# Patient Record
Sex: FEMALE | Race: WHITE | HISPANIC OR LATINO | Employment: UNEMPLOYED | URBAN - METROPOLITAN AREA
[De-identification: names, ages, dates, MRNs, and addresses within clinical notes are randomized per-mention and may not be internally consistent; named-entity substitution may affect disease eponyms.]

---

## 2023-12-05 ENCOUNTER — DOCUMENTATION (OUTPATIENT)
Dept: OBSTETRICS AND GYNECOLOGY | Facility: CLINIC | Age: 38
End: 2023-12-05
Payer: MEDICAID

## 2023-12-05 DIAGNOSIS — Z30.9 ENCOUNTER FOR CONTRACEPTIVE MANAGEMENT, UNSPECIFIED TYPE: ICD-10-CM

## 2023-12-05 RX ORDER — NORETHINDRONE 0.35 MG/1
1 TABLET ORAL DAILY
COMMUNITY
Start: 2018-11-01 | End: 2023-12-05 | Stop reason: SDUPTHER

## 2023-12-06 ENCOUNTER — TELEPHONE (OUTPATIENT)
Dept: OBSTETRICS AND GYNECOLOGY | Facility: CLINIC | Age: 38
End: 2023-12-06
Payer: MEDICAID

## 2023-12-06 RX ORDER — NORETHINDRONE 0.35 MG/1
1 TABLET ORAL DAILY
Qty: 90 TABLET | Refills: 0 | Status: SHIPPED | OUTPATIENT
Start: 2023-12-06 | End: 2024-02-09 | Stop reason: SDUPTHER

## 2023-12-06 NOTE — TELEPHONE ENCOUNTER
Pt is aware a fax for OCP RF's was received from pharm yestarday and Rx request was sent to provider to sign off. Advised to check with pharm in 24-48hrs.

## 2024-01-08 DIAGNOSIS — B00.9 HERPES: ICD-10-CM

## 2024-01-08 RX ORDER — ACYCLOVIR 400 MG/1
400 TABLET ORAL 2 TIMES DAILY
Qty: 90 TABLET | Refills: 0 | Status: SHIPPED | OUTPATIENT
Start: 2024-01-08 | End: 2024-02-09 | Stop reason: SDUPTHER

## 2024-01-08 RX ORDER — ACYCLOVIR 400 MG/1
400 TABLET ORAL 2 TIMES DAILY
COMMUNITY
End: 2024-01-08 | Stop reason: SDUPTHER

## 2024-01-30 ENCOUNTER — OFFICE VISIT (OUTPATIENT)
Dept: CARDIOLOGY | Facility: CLINIC | Age: 39
End: 2024-01-30
Payer: MEDICAID

## 2024-01-30 VITALS
SYSTOLIC BLOOD PRESSURE: 120 MMHG | BODY MASS INDEX: 25.69 KG/M2 | HEIGHT: 63 IN | WEIGHT: 145 LBS | OXYGEN SATURATION: 97 % | DIASTOLIC BLOOD PRESSURE: 75 MMHG | HEART RATE: 103 BPM

## 2024-01-30 DIAGNOSIS — G90.A POTS (POSTURAL ORTHOSTATIC TACHYCARDIA SYNDROME): Primary | ICD-10-CM

## 2024-01-30 DIAGNOSIS — R42 DIZZINESS: ICD-10-CM

## 2024-01-30 PROCEDURE — 93010 ELECTROCARDIOGRAM REPORT: CPT | Performed by: INTERNAL MEDICINE

## 2024-01-30 PROCEDURE — 99214 OFFICE O/P EST MOD 30 MIN: CPT | Performed by: INTERNAL MEDICINE

## 2024-01-30 PROCEDURE — 93005 ELECTROCARDIOGRAM TRACING: CPT | Performed by: INTERNAL MEDICINE

## 2024-01-30 RX ORDER — LORATADINE 10 MG
10 TABLET,DISINTEGRATING ORAL AS NEEDED
COMMUNITY

## 2024-01-30 ASSESSMENT — PATIENT HEALTH QUESTIONNAIRE - PHQ9
2. FEELING DOWN, DEPRESSED OR HOPELESS: NOT AT ALL
1. LITTLE INTEREST OR PLEASURE IN DOING THINGS: NOT AT ALL
SUM OF ALL RESPONSES TO PHQ9 QUESTIONS 1 AND 2: 0

## 2024-01-30 ASSESSMENT — COLUMBIA-SUICIDE SEVERITY RATING SCALE - C-SSRS
6. HAVE YOU EVER DONE ANYTHING, STARTED TO DO ANYTHING, OR PREPARED TO DO ANYTHING TO END YOUR LIFE?: NO
2. HAVE YOU ACTUALLY HAD ANY THOUGHTS OF KILLING YOURSELF?: NO
1. IN THE PAST MONTH, HAVE YOU WISHED YOU WERE DEAD OR WISHED YOU COULD GO TO SLEEP AND NOT WAKE UP?: NO

## 2024-01-30 ASSESSMENT — PAIN SCALES - GENERAL: PAINLEVEL: 0-NO PAIN

## 2024-01-31 LAB
ATRIAL RATE: 103 BPM
P AXIS: 58 DEGREES
P OFFSET: 192 MS
P ONSET: 141 MS
PR INTERVAL: 170 MS
Q ONSET: 226 MS
QRS COUNT: 17 BEATS
QRS DURATION: 72 MS
QT INTERVAL: 320 MS
QTC CALCULATION(BAZETT): 419 MS
QTC FREDERICIA: 383 MS
R AXIS: 10 DEGREES
T AXIS: 50 DEGREES
T OFFSET: 386 MS
VENTRICULAR RATE: 103 BPM

## 2024-02-08 NOTE — PROGRESS NOTES
"CHIEF COMPLAINT: routine follow-up visit    HISTORY OF PRESENT ILLNESS:    PCP: Dr. Ciarra Garcia     Ms. Del Angel is a 39 yo F with hx as listed below who returns to Cardiology Clinic for follow-up visit; last seen by me in 2021. Tried to quit smoking in interim but felt LH, weak, and more palpitations (thinks POTS flare) so has restarted. ECG today shows sinus tachycardia with  bpm. Is wearing compression and putting salt in drinks. On Fitbit, HR gets to 140s and she is only doing about 400 steps per day. Asking about saline infusions. TE (2019): EF 70-75%, no MVP and no MR. Denies CP or SOB. Of note, was on florinef in the past for orthostatic hypotension without much symptomatic improvement.      PAST MEDICAL/SURGICAL HISTORY:  -NAFLD  -MVP (not seen on recent echo)  -EDS (hypermobility type)  -anxiety/depression  -orthostatic hypotension (previously on florinef); ?POTS  -tobacco abuse  -PTSD  -scoliosis  -fibromyalgia  -GERD with mild gastritis  -B12 deficiency anemia  -HSV     PRIOR CARDIAC TESTING:   -TTE (2019): EF 70-75%, no MVP, no MR  -TTE (2013): EF 65%, MVP, no MR  -CXR (2014): pectus excavatum     FAMILY HISTORY:  -lung cancer in MGF, PGM  -Mom: DM   -Brother: questionable Marfan's     Allergies   Allergen Reactions    Codeine Hives     Current Outpatient Medications:     acyclovir (Zovirax) 400 mg tablet, Take 1 tablet (400 mg) by mouth 2 times a day., Disp: 90 tablet, Rfl: 0    loratadine (Claritin Reditabs) 10 mg disintegrating tablet, Take 1 tablet (10 mg) by mouth if needed for allergies., Disp: , Rfl:     norethindrone (Micronor) 0.35 mg tablet, Take 1 tablet (0.35 mg) by mouth once daily., Disp: 90 tablet, Rfl: 0    /75 (BP Location: Left arm, Patient Position: Sitting)   Pulse 103   Ht 1.6 m (5' 3\")   Wt 65.8 kg (145 lb)   SpO2 97%   BMI 25.69 kg/m²     PHYSICAL EXAM:  GENERAL: NAD  HEENT: no JVD  CV: RRR without m/r/g  PULM: CTAB  EXT: non-edematous bilateral lower extremities "     LABS  WBC (x10E9/L)   Date Value   07/29/2021 11.4 (H)     Hemoglobin (g/dL)   Date Value   07/29/2021 14.8     Platelets (x10E9/L)   Date Value   07/29/2021 285     Sodium (mmol/L)   Date Value   07/29/2021 137     Potassium (mmol/L)   Date Value   07/29/2021 4.0     Chloride (mmol/L)   Date Value   07/29/2021 106     Bicarbonate (mmol/L)   Date Value   07/29/2021 22     Urea Nitrogen (mg/dL)   Date Value   07/29/2021 14     Creatinine (mg/dL)   Date Value   07/29/2021 0.79     Calcium (mg/dL)   Date Value   07/29/2021 9.2     Total Protein (g/dL)   Date Value   07/29/2021 7.3     Total Bilirubin (mg/dL)   Date Value   07/29/2021 0.4     Alkaline Phosphatase (U/L)   Date Value   07/29/2021 79     ALT (SGPT) (U/L)   Date Value   07/29/2021 11     AST (U/L)   Date Value   07/29/2021 16     Glucose (mg/dL)   Date Value   07/29/2021 95     Cholesterol (mg/dL)   Date Value   07/29/2021 175   08/20/2019 182     HDL (mg/dL)   Date Value   07/29/2021 47.0   08/20/2019 62.2     Triglycerides (mg/dL)   Date Value   07/29/2021 127   08/20/2019 113     Lab Results   Component Value Date    LDLF 103 (H) 07/29/2021     ASSESSMENT/PLAN: 35 yo F with hx of ?POTS here for follow-up. Discussed lifestyle modifications and she is interested in Aquatic therapy. Autonomic Neurology referral placed. Encouraged her to wear compression stockings and exercise regularly. RTC prn..

## 2024-02-09 ENCOUNTER — LAB (OUTPATIENT)
Dept: LAB | Facility: LAB | Age: 39
End: 2024-02-09
Payer: MEDICAID

## 2024-02-09 ENCOUNTER — OFFICE VISIT (OUTPATIENT)
Dept: OBSTETRICS AND GYNECOLOGY | Facility: CLINIC | Age: 39
End: 2024-02-09
Payer: MEDICAID

## 2024-02-09 VITALS
DIASTOLIC BLOOD PRESSURE: 60 MMHG | WEIGHT: 146 LBS | HEIGHT: 63 IN | BODY MASS INDEX: 25.87 KG/M2 | SYSTOLIC BLOOD PRESSURE: 98 MMHG

## 2024-02-09 DIAGNOSIS — Z30.9 ENCOUNTER FOR CONTRACEPTIVE MANAGEMENT, UNSPECIFIED TYPE: ICD-10-CM

## 2024-02-09 DIAGNOSIS — N89.8 VAGINAL IRRITATION: ICD-10-CM

## 2024-02-09 DIAGNOSIS — B00.9 HERPES: ICD-10-CM

## 2024-02-09 DIAGNOSIS — Z11.3 SCREEN FOR STD (SEXUALLY TRANSMITTED DISEASE): ICD-10-CM

## 2024-02-09 DIAGNOSIS — Z01.419 WELL WOMAN EXAM: ICD-10-CM

## 2024-02-09 DIAGNOSIS — Z11.3 ROUTINE SCREENING FOR STI (SEXUALLY TRANSMITTED INFECTION): ICD-10-CM

## 2024-02-09 DIAGNOSIS — Z11.3 ROUTINE SCREENING FOR STI (SEXUALLY TRANSMITTED INFECTION): Primary | ICD-10-CM

## 2024-02-09 PROCEDURE — 86803 HEPATITIS C AB TEST: CPT

## 2024-02-09 PROCEDURE — 99395 PREV VISIT EST AGE 18-39: CPT | Performed by: ADVANCED PRACTICE MIDWIFE

## 2024-02-09 PROCEDURE — 87389 HIV-1 AG W/HIV-1&-2 AB AG IA: CPT

## 2024-02-09 PROCEDURE — 87205 SMEAR GRAM STAIN: CPT

## 2024-02-09 PROCEDURE — 86780 TREPONEMA PALLIDUM: CPT

## 2024-02-09 PROCEDURE — 36415 COLL VENOUS BLD VENIPUNCTURE: CPT

## 2024-02-09 PROCEDURE — 87661 TRICHOMONAS VAGINALIS AMPLIF: CPT

## 2024-02-09 PROCEDURE — 87340 HEPATITIS B SURFACE AG IA: CPT

## 2024-02-09 PROCEDURE — 87800 DETECT AGNT MULT DNA DIREC: CPT

## 2024-02-09 RX ORDER — ACYCLOVIR 400 MG/1
400 TABLET ORAL 2 TIMES DAILY
Qty: 90 TABLET | Refills: 3 | Status: SHIPPED | OUTPATIENT
Start: 2024-02-09

## 2024-02-09 RX ORDER — NORETHINDRONE 0.35 MG/1
1 TABLET ORAL DAILY
Qty: 90 TABLET | Refills: 4 | Status: SHIPPED | OUTPATIENT
Start: 2024-02-09

## 2024-02-09 ASSESSMENT — PAIN SCALES - GENERAL: PAINLEVEL: 0-NO PAIN

## 2024-02-09 NOTE — PROGRESS NOTES
Assessment/Plan   Problem List Items Addressed This Visit             ICD-10-CM       Medium    Encounter for contraceptive management Z30.9    Relevant Medications    norethindrone (Micronor) 0.35 mg tablet    Herpes B00.9    Relevant Medications    acyclovir (Zovirax) 400 mg tablet    Screen for STD (sexually transmitted disease) Z11.3    Relevant Orders    C. Trachomatis / N. Gonorrhoeae, Amplified Detection (Completed)    Trichomonas vaginalis, Nucleic Acid Detection (Completed)    Vaginal irritation N89.8    Relevant Orders    Vaginitis Gram Stain For Bacterial Vaginosis + Yeast    Well woman exam Z01.419     STI screen  Up to date with pap  Refill on POP            Other Visit Diagnoses         Codes    Routine screening for STI (sexually transmitted infection)    -  Primary Z11.3    Relevant Orders    Hepatitis BsAg (Completed)    Hepatitis C Antibody (Completed)    HIV 1/2 Antigen/Antibody Screen with Reflex to Confirmation (Completed)    Syphilis Screen with Reflex (Completed)            ROSA Mcneill-TAN     Subjective   Alanna Del Angel is a 38 y.o. adult who is here for a routine exam.     They/them pronouns   Identifies as nonbinary     Takes daily suppressive therapy for HSV, rare outbreaks, needs refill.   Needs refill on POP     Worried about possible yeast infection     Lives with: roommates   Current employment: unemployed-trying for disability   T/E/D: former tobacco/no ETOH/ medical marijuana   Up to date vision/dental: needs to schedule   PCP: jade Garcia   Menstrual history: irregular bleeding on POP   Sexual history: long distance partner-not currently sexually active    male partner within the last year   Pregnancy history:  G/P  T: P:  EAB:   Ectopic:   SAB:   L:      Diet: lots of food intolerance   Exercise: 4000 steps daily     PMH, PSH, and Family hx reviewed and updated    Current contraception: oral progesterone-only contraceptive  Refill Y/N:    Last pap: 2022,  "NILM/neg HPV   History of abnormal Pap smear: yes - remote hx of abnormal in college, colpo negative   Family history of breast, uterine,  ovarian cancer: no  Regular self breast exam: no  Last mammogram: n/a   History of abnormal mammogram: no          Review of Systems    Objective   BP 98/60   Ht 1.6 m (5' 3\")   Wt 66.2 kg (146 lb)   LMP 01/12/2024   BMI 25.86 kg/m²     Physical Exam  Constitutional:       Appearance: Normal appearance.   HENT:      Head: Normocephalic.   Neck:      Thyroid: No thyroid mass, thyromegaly or thyroid tenderness.   Cardiovascular:      Rate and Rhythm: Normal rate and regular rhythm.   Pulmonary:      Effort: Pulmonary effort is normal.      Breath sounds: Normal breath sounds.   Chest:   Breasts:     Right: Normal. No mass, nipple discharge or tenderness.      Left: Normal. No mass, nipple discharge or tenderness.   Abdominal:      Palpations: Abdomen is soft.   Genitourinary:     Vagina: Vaginal discharge present.      Cervix: Normal.      Comments: Scant/white discharge, no odor   Musculoskeletal:         General: Normal range of motion.   Lymphadenopathy:      Upper Body:      Right upper body: No axillary adenopathy.      Left upper body: No axillary adenopathy.   Skin:     General: Skin is warm and dry.   Neurological:      Mental Status: Alanna Del Angel is alert and oriented to person, place, and time.   Psychiatric:         Mood and Affect: Mood normal.         "

## 2024-02-09 NOTE — PROGRESS NOTES
JENNIE ( Breast/Pelvic Exam )    Last pap: 12/20/22 ( wnl - hpv )   Last mammo: n/a  Last colon: n/a    CHAPERONE, LEIGHANN YOUNGBLOOD, [facility]

## 2024-02-10 PROBLEM — N89.8 VAGINAL IRRITATION: Status: ACTIVE | Noted: 2024-02-10

## 2024-02-10 PROBLEM — B00.9 HERPES: Status: ACTIVE | Noted: 2024-02-10

## 2024-02-10 PROBLEM — Z11.3 SCREEN FOR STD (SEXUALLY TRANSMITTED DISEASE): Status: ACTIVE | Noted: 2024-02-10

## 2024-02-10 PROBLEM — Z30.9 ENCOUNTER FOR CONTRACEPTIVE MANAGEMENT: Status: ACTIVE | Noted: 2024-02-10

## 2024-02-10 PROBLEM — Z01.419 WELL WOMAN EXAM: Status: ACTIVE | Noted: 2024-02-10

## 2024-02-10 LAB
C TRACH RRNA SPEC QL NAA+PROBE: NEGATIVE
HBV SURFACE AG SERPL QL IA: NONREACTIVE
HCV AB SER QL: NONREACTIVE
HIV 1+2 AB+HIV1 P24 AG SERPL QL IA: NONREACTIVE
N GONORRHOEA DNA SPEC QL PROBE+SIG AMP: NEGATIVE
T VAGINALIS RRNA SPEC QL NAA+PROBE: NEGATIVE
TREPONEMA PALLIDUM IGG+IGM AB [PRESENCE] IN SERUM OR PLASMA BY IMMUNOASSAY: NONREACTIVE

## 2024-02-11 LAB
CLUE CELLS VAG LPF-#/AREA: NORMAL /[LPF]
NUGENT SCORE: 0
YEAST VAG WET PREP-#/AREA: NORMAL

## 2024-02-23 ENCOUNTER — EVALUATION (OUTPATIENT)
Dept: PHYSICAL THERAPY | Facility: CLINIC | Age: 39
End: 2024-02-23
Payer: MEDICAID

## 2024-02-23 VITALS — SYSTOLIC BLOOD PRESSURE: 115 MMHG | HEART RATE: 81 BPM | DIASTOLIC BLOOD PRESSURE: 73 MMHG

## 2024-02-23 DIAGNOSIS — Z74.09 DECREASED FUNCTIONAL MOBILITY AND ENDURANCE: ICD-10-CM

## 2024-02-23 DIAGNOSIS — G90.A POTS (POSTURAL ORTHOSTATIC TACHYCARDIA SYNDROME): ICD-10-CM

## 2024-02-23 DIAGNOSIS — R53.1 WEAKNESS: Primary | ICD-10-CM

## 2024-02-23 PROCEDURE — 97162 PT EVAL MOD COMPLEX 30 MIN: CPT | Mod: GP

## 2024-02-23 ASSESSMENT — PAIN - FUNCTIONAL ASSESSMENT: PAIN_FUNCTIONAL_ASSESSMENT: 0-10

## 2024-02-23 ASSESSMENT — ENCOUNTER SYMPTOMS
DEPRESSION: 0
OCCASIONAL FEELINGS OF UNSTEADINESS: 1
LOSS OF SENSATION IN FEET: 1

## 2024-02-23 ASSESSMENT — PAIN SCALES - GENERAL: PAINLEVEL_OUTOF10: 7

## 2024-02-23 ASSESSMENT — PAIN DESCRIPTION - DESCRIPTORS: DESCRIPTORS: ACHING;THROBBING

## 2024-02-23 NOTE — PROGRESS NOTES
Physical Therapy    Physical Therapy Evaluation and Treatment      Patient Name: Alanna Del Angel  MRN: [id number]  Today's Date: 2/23/2024  Time Calculation  Start Time: 0805  Stop Time: 0845  Time Calculation (min): 40 min    Insurance:  2024 [employer] AUTH AFTER 30 VS VERNELL YR     Assessment:  PT Assessment Results: Decreased strength, Decreased endurance, Impaired balance, Pain  Rehab Prognosis: Good  Pt is a 38 year old presenting to PT with POTS/EDS. Standardized testing of FGA, MMT and symptoms report reveal that pt has multiple impairments in body structures/functions, activity limitations and participation restrictions. These include subjective and objective findings such as decreased strength, symptoms with mobility and balance based deficits.  Pt to benefit from skilled PT interventions to improve functional mobility deficits to improve independence and decrease fall risk.      Plan:  Treatment/Interventions: Aquatic therapy, Education/ Instruction, Gait training, Neuromuscular re-education, Therapeutic activities, Therapeutic exercises  PT Plan: Skilled PT  PT Frequency: 1 time per week  Duration: 6-8 weeks      Current Problem:   1. Weakness        2. POTS (postural orthostatic tachycardia syndrome)  Referral to Physical Therapy      3. Decreased functional mobility and endurance            Subjective     Pt reports to PT ambulatory without a device. Pt reports having previous aquatic therapy for EDS in the past and has seen significant benefits. Recently, pt has experienced an increase in POTS related symptoms of lightheadedness/dizziness. EDS related symptoms found in large joints vs smaller joints however does affect usage of utilization of hands. Pt experiences numbness in B feet consistently. Current exercise level is minimal, completes ~ 4000 steps per day.     General:     Vital Signs:  Vital Signs  Heart Rate: 81  BP: 115/73    Once in standing; /71   Pain:   Pain Assessment  Pain  Assessment: 0-10  Pain Score: 7  Pain Type: Chronic pain  Pain Location: Back  Pain Descriptors: Aching, Throbbing  Home Living:    Pt lives in a multi level home with friend  Prior Level of Function:   Functional mobility; independent   ADLs; limited with pain, but independent  (+) Driving  Patient goal;  Improve muscle strength and reduce dislocations  Right handed    Objective   Right lower extremity  Hip flexion; 4-/5  Hip abduction; 4/5  Hip adduction; 4/5   Knee flexion; 4+/5  Knee extension; 4+/5  Ankle dorsiflexion; 4-/5  Ankle plantarflexion; 4-/5    Left lower extremity  Hip flexion; 4-/5  Hip abduction; 4/5  Hip adduction; 4/5   Knee flexion; 4+/5  Knee extension; 4+/5  Ankle dorsiflexion; 4-/5  Ankle plantarflexion; 4-/5    Right upper extremity  Shldr flexion; 4-/5  Elbow flexion; 4/5  Elbow extension; 4/5   WFL    Left upper extremity  Shldr flexion; 3+/5  Elbow flexion; 4/5  Elbow extension; 4/5  ; WFL    Sensation:  Pt reports numbness on bottom of feet, denies through lower limb    Coordination  MARIA ELENA at ankles; intact      Outcome Measures:  FGA - Functional Gait Assessment  Gait level surface: 3  Change in gait speed: 3  Gait with horizontal head turns: 2  Gait with vertical head turns: 2  Gait and pivot turn: 2  Step over obstacle: 3  Gait with narrow base of support: 2  Gait with eyes closed: 2  Ambulating backwards: 2  Steps: 3  FGA Total Score: 24    Other Measures  Lower Extremity Funtional Score (LEFS): 34     Treatments:  Education and discussion on HEP and treatment regarding the benefits related to current condition, POC, pathophysiology, and precautions    Goals:  Pt will improve FGA by 3 points to meet MASSIEL  Pt will demonstrate independence with HEP  Pt will report 0 falls during POC  Pt will report a decrease of average pain full body to 6/10  Pt will improve LEFS by 9 points to meet MASSIEL

## 2024-02-26 ENCOUNTER — OFFICE VISIT (OUTPATIENT)
Dept: PRIMARY CARE | Facility: CLINIC | Age: 39
End: 2024-02-26
Payer: MEDICAID

## 2024-02-26 VITALS
SYSTOLIC BLOOD PRESSURE: 124 MMHG | HEART RATE: 86 BPM | DIASTOLIC BLOOD PRESSURE: 84 MMHG | HEIGHT: 63 IN | BODY MASS INDEX: 25.73 KG/M2 | WEIGHT: 145.2 LBS | OXYGEN SATURATION: 98 %

## 2024-02-26 DIAGNOSIS — F41.1 GAD (GENERALIZED ANXIETY DISORDER): Primary | ICD-10-CM

## 2024-02-26 DIAGNOSIS — F51.01 PRIMARY INSOMNIA: ICD-10-CM

## 2024-02-26 DIAGNOSIS — Z91.018 FOOD ALLERGY: ICD-10-CM

## 2024-02-26 DIAGNOSIS — G90.A POTS (POSTURAL ORTHOSTATIC TACHYCARDIA SYNDROME): ICD-10-CM

## 2024-02-26 DIAGNOSIS — K58.8 OTHER IRRITABLE BOWEL SYNDROME: ICD-10-CM

## 2024-02-26 DIAGNOSIS — F43.10 PTSD (POST-TRAUMATIC STRESS DISORDER): ICD-10-CM

## 2024-02-26 DIAGNOSIS — F32.1 CURRENT MODERATE EPISODE OF MAJOR DEPRESSIVE DISORDER, UNSPECIFIED WHETHER RECURRENT (MULTI): ICD-10-CM

## 2024-02-26 PROCEDURE — 96127 BRIEF EMOTIONAL/BEHAV ASSMT: CPT | Performed by: FAMILY MEDICINE

## 2024-02-26 PROCEDURE — 99215 OFFICE O/P EST HI 40 MIN: CPT | Performed by: FAMILY MEDICINE

## 2024-02-26 RX ORDER — AMITRIPTYLINE HYDROCHLORIDE 25 MG/1
25 TABLET, FILM COATED ORAL NIGHTLY
Qty: 30 TABLET | Refills: 0 | Status: SHIPPED | OUTPATIENT
Start: 2024-02-26 | End: 2024-05-01 | Stop reason: SDUPTHER

## 2024-02-26 RX ORDER — B.COAGULANS/DIGESTIVE ENZYM 10 2B CELL
1 CAPSULE ORAL DAILY
Qty: 90 TABLET | Refills: 3 | Status: SHIPPED | OUTPATIENT
Start: 2024-02-26

## 2024-02-26 RX ORDER — DESVENLAFAXINE 50 MG/1
50 TABLET, EXTENDED RELEASE ORAL DAILY
Qty: 90 TABLET | Refills: 0 | Status: SHIPPED | OUTPATIENT
Start: 2024-02-26 | End: 2024-05-01 | Stop reason: SDUPTHER

## 2024-02-26 ASSESSMENT — PATIENT HEALTH QUESTIONNAIRE - PHQ9
7. TROUBLE CONCENTRATING ON THINGS, SUCH AS READING THE NEWSPAPER OR WATCHING TELEVISION: MORE THAN HALF THE DAYS
3. TROUBLE FALLING OR STAYING ASLEEP OR SLEEPING TOO MUCH: NEARLY EVERY DAY
SUM OF ALL RESPONSES TO PHQ9 QUESTIONS 1 AND 2: 2
2. FEELING DOWN, DEPRESSED OR HOPELESS: SEVERAL DAYS
6. FEELING BAD ABOUT YOURSELF - OR THAT YOU ARE A FAILURE OR HAVE LET YOURSELF OR YOUR FAMILY DOWN: SEVERAL DAYS
5. POOR APPETITE OR OVEREATING: NEARLY EVERY DAY
10. IF YOU CHECKED OFF ANY PROBLEMS, HOW DIFFICULT HAVE THESE PROBLEMS MADE IT FOR YOU TO DO YOUR WORK, TAKE CARE OF THINGS AT HOME, OR GET ALONG WITH OTHER PEOPLE: SOMEWHAT DIFFICULT
4. FEELING TIRED OR HAVING LITTLE ENERGY: NEARLY EVERY DAY
8. MOVING OR SPEAKING SO SLOWLY THAT OTHER PEOPLE COULD HAVE NOTICED. OR THE OPPOSITE, BEING SO FIGETY OR RESTLESS THAT YOU HAVE BEEN MOVING AROUND A LOT MORE THAN USUAL: NOT AT ALL
9. THOUGHTS THAT YOU WOULD BE BETTER OFF DEAD, OR OF HURTING YOURSELF: NOT AT ALL
1. LITTLE INTEREST OR PLEASURE IN DOING THINGS: SEVERAL DAYS
SUM OF ALL RESPONSES TO PHQ QUESTIONS 1-9: 14
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
## 2024-03-06 ENCOUNTER — TELEPHONE (OUTPATIENT)
Dept: PRIMARY CARE | Facility: CLINIC | Age: 39
End: 2024-03-06

## 2024-03-11 ENCOUNTER — APPOINTMENT (OUTPATIENT)
Dept: PRIMARY CARE | Facility: CLINIC | Age: 39
End: 2024-03-11
Payer: MEDICAID

## 2024-03-12 ENCOUNTER — APPOINTMENT (OUTPATIENT)
Dept: PRIMARY CARE | Facility: CLINIC | Age: 39
End: 2024-03-12
Payer: MEDICAID

## 2024-03-19 ENCOUNTER — APPOINTMENT (OUTPATIENT)
Dept: NEUROLOGY | Facility: HOSPITAL | Age: 39
End: 2024-03-19
Payer: MEDICAID

## 2024-03-26 ENCOUNTER — LAB (OUTPATIENT)
Dept: LAB | Facility: LAB | Age: 39
End: 2024-03-26
Payer: MEDICAID

## 2024-03-26 DIAGNOSIS — F32.1 CURRENT MODERATE EPISODE OF MAJOR DEPRESSIVE DISORDER, UNSPECIFIED WHETHER RECURRENT (MULTI): ICD-10-CM

## 2024-03-26 DIAGNOSIS — F43.10 PTSD (POST-TRAUMATIC STRESS DISORDER): ICD-10-CM

## 2024-03-26 DIAGNOSIS — F41.1 GAD (GENERALIZED ANXIETY DISORDER): ICD-10-CM

## 2024-03-26 LAB
25(OH)D3 SERPL-MCNC: 26 NG/ML (ref 30–100)
ALBUMIN SERPL BCP-MCNC: 4.3 G/DL (ref 3.4–5)
ALP SERPL-CCNC: 80 U/L (ref 33–120)
ALT SERPL W P-5'-P-CCNC: 15 U/L (ref 7–52)
ANION GAP SERPL CALC-SCNC: 14 MMOL/L (ref 10–20)
AST SERPL W P-5'-P-CCNC: 16 U/L (ref 9–39)
BASOPHILS # BLD AUTO: 0.1 X10*3/UL (ref 0–0.1)
BASOPHILS NFR BLD AUTO: 0.8 %
BILIRUB SERPL-MCNC: 0.6 MG/DL (ref 0–1.2)
BUN SERPL-MCNC: 9 MG/DL (ref 6–23)
CALCIUM SERPL-MCNC: 9.3 MG/DL (ref 8.6–10.3)
CHLORIDE SERPL-SCNC: 103 MMOL/L (ref 98–107)
CHOLEST SERPL-MCNC: 177 MG/DL (ref 0–199)
CHOLESTEROL/HDL RATIO: 3.4
CO2 SERPL-SCNC: 25 MMOL/L (ref 21–32)
CREAT SERPL-MCNC: 0.71 MG/DL (ref 0.5–1.3)
CRP SERPL HS-MCNC: 2.6 MG/L
EGFRCR SERPLBLD CKD-EPI 2021: >90 ML/MIN/1.73M*2
EOSINOPHIL # BLD AUTO: 0.19 X10*3/UL (ref 0–0.7)
EOSINOPHIL NFR BLD AUTO: 1.6 %
ERYTHROCYTE [DISTWIDTH] IN BLOOD BY AUTOMATED COUNT: 12.2 % (ref 11.5–14.5)
FERRITIN SERPL-MCNC: 38 NG/ML (ref 8–300)
GLUCOSE SERPL-MCNC: 92 MG/DL (ref 74–99)
HCT VFR BLD AUTO: 44.4 % (ref 36–52)
HDLC SERPL-MCNC: 51.6 MG/DL
HGB BLD-MCNC: 14.7 G/DL (ref 12–17.5)
IMM GRANULOCYTES # BLD AUTO: 0.03 X10*3/UL (ref 0–0.7)
IMM GRANULOCYTES NFR BLD AUTO: 0.3 % (ref 0–0.9)
LDLC SERPL CALC-MCNC: 82 MG/DL
LYMPHOCYTES # BLD AUTO: 3.99 X10*3/UL (ref 1.2–4.8)
LYMPHOCYTES NFR BLD AUTO: 33.3 %
MAGNESIUM SERPL-MCNC: 1.53 MG/DL (ref 1.6–2.4)
MCH RBC QN AUTO: 30.5 PG (ref 26–34)
MCHC RBC AUTO-ENTMCNC: 33.1 G/DL (ref 32–36)
MCV RBC AUTO: 92 FL (ref 80–100)
MONOCYTES # BLD AUTO: 0.64 X10*3/UL (ref 0.1–1)
MONOCYTES NFR BLD AUTO: 5.3 %
NEUTROPHILS # BLD AUTO: 7.05 X10*3/UL (ref 1.2–7.7)
NEUTROPHILS NFR BLD AUTO: 58.7 %
NON HDL CHOLESTEROL: 125 MG/DL (ref 0–149)
NRBC BLD-RTO: 0 /100 WBCS (ref 0–0)
PLATELET # BLD AUTO: 379 X10*3/UL (ref 150–450)
POTASSIUM SERPL-SCNC: 4 MMOL/L (ref 3.5–5.3)
PROT SERPL-MCNC: 7 G/DL (ref 6.4–8.2)
RBC # BLD AUTO: 4.82 X10*6/UL (ref 4–5.9)
SODIUM SERPL-SCNC: 138 MMOL/L (ref 136–145)
T3FREE SERPL-MCNC: 3.4 PG/ML (ref 2.3–4.2)
TRIGL SERPL-MCNC: 217 MG/DL (ref 0–149)
TSH SERPL-ACNC: 1.15 MIU/L (ref 0.44–3.98)
VIT B12 SERPL-MCNC: 462 PG/ML (ref 211–911)
VLDL: 43 MG/DL (ref 0–40)
WBC # BLD AUTO: 12 X10*3/UL (ref 4.4–11.3)

## 2024-03-26 PROCEDURE — 82728 ASSAY OF FERRITIN: CPT

## 2024-03-26 PROCEDURE — 86141 C-REACTIVE PROTEIN HS: CPT

## 2024-03-26 PROCEDURE — 82607 VITAMIN B-12: CPT

## 2024-03-26 PROCEDURE — 84481 FREE ASSAY (FT-3): CPT

## 2024-03-26 PROCEDURE — 83735 ASSAY OF MAGNESIUM: CPT

## 2024-03-26 PROCEDURE — 82306 VITAMIN D 25 HYDROXY: CPT

## 2024-03-26 PROCEDURE — 85025 COMPLETE CBC W/AUTO DIFF WBC: CPT

## 2024-03-26 PROCEDURE — 83090 ASSAY OF HOMOCYSTEINE: CPT

## 2024-03-26 PROCEDURE — 80053 COMPREHEN METABOLIC PANEL: CPT

## 2024-03-26 PROCEDURE — 36415 COLL VENOUS BLD VENIPUNCTURE: CPT

## 2024-03-26 PROCEDURE — 80061 LIPID PANEL: CPT

## 2024-03-26 PROCEDURE — 84443 ASSAY THYROID STIM HORMONE: CPT

## 2024-03-27 LAB — HCYS SERPL-SCNC: 30.75 UMOL/L (ref 5–13.9)

## 2024-03-28 ENCOUNTER — OFFICE VISIT (OUTPATIENT)
Dept: PRIMARY CARE | Facility: CLINIC | Age: 39
End: 2024-03-28
Payer: MEDICAID

## 2024-03-28 VITALS
HEART RATE: 104 BPM | BODY MASS INDEX: 25.52 KG/M2 | DIASTOLIC BLOOD PRESSURE: 82 MMHG | OXYGEN SATURATION: 97 % | SYSTOLIC BLOOD PRESSURE: 125 MMHG | WEIGHT: 144 LBS | HEIGHT: 63 IN

## 2024-03-28 DIAGNOSIS — F41.1 GAD (GENERALIZED ANXIETY DISORDER): ICD-10-CM

## 2024-03-28 DIAGNOSIS — M79.7 FIBROMYALGIA: ICD-10-CM

## 2024-03-28 DIAGNOSIS — Z15.89 MTHFR GENE MUTATION: Primary | ICD-10-CM

## 2024-03-28 DIAGNOSIS — E53.9 VITAMIN B DEFICIENCY: ICD-10-CM

## 2024-03-28 DIAGNOSIS — F32.1 CURRENT MODERATE EPISODE OF MAJOR DEPRESSIVE DISORDER, UNSPECIFIED WHETHER RECURRENT (MULTI): ICD-10-CM

## 2024-03-28 PROCEDURE — 99215 OFFICE O/P EST HI 40 MIN: CPT | Performed by: FAMILY MEDICINE

## 2024-03-28 PROCEDURE — 96372 THER/PROPH/DIAG INJ SC/IM: CPT | Performed by: FAMILY MEDICINE

## 2024-03-28 RX ORDER — CYANOCOBALAMIN 1000 UG/ML
1000 INJECTION, SOLUTION INTRAMUSCULAR; SUBCUTANEOUS ONCE
Status: COMPLETED | OUTPATIENT
Start: 2024-03-28 | End: 2024-03-28

## 2024-03-28 RX ORDER — BACILLUS COAGULANS/LACTASE 500MM-3000
1 CAPSULE ORAL DAILY
COMMUNITY
Start: 2024-03-14

## 2024-03-28 RX ORDER — LEUCOVORIN CALCIUM 5 MG/1
5 TABLET ORAL DAILY
Qty: 30 TABLET | Refills: 1 | Status: SHIPPED | OUTPATIENT
Start: 2024-03-28 | End: 2024-05-27

## 2024-03-28 RX ORDER — LANOLIN ALCOHOL/MO/W.PET/CERES
50 CREAM (GRAM) TOPICAL DAILY
Qty: 30 TABLET | Refills: 11 | Status: SHIPPED | OUTPATIENT
Start: 2024-03-28 | End: 2025-03-28

## 2024-03-28 RX ORDER — ERGOCALCIFEROL 1.25 MG/1
50000 CAPSULE ORAL
Qty: 4 CAPSULE | Refills: 1 | Status: SHIPPED | OUTPATIENT
Start: 2024-03-28 | End: 2024-05-23

## 2024-03-28 RX ORDER — LEUCOVORIN CALCIUM 5 MG/1
TABLET ORAL DAILY
Status: CANCELLED | OUTPATIENT
Start: 2024-03-28 | End: 2024-04-04

## 2024-03-28 RX ADMIN — CYANOCOBALAMIN 1000 MCG: 1000 INJECTION, SOLUTION INTRAMUSCULAR; SUBCUTANEOUS at 11:34
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
## 2024-04-01 ENCOUNTER — APPOINTMENT (OUTPATIENT)
Dept: NEUROLOGY | Facility: HOSPITAL | Age: 39
End: 2024-04-01
Payer: MEDICAID

## 2024-04-04 ENCOUNTER — APPOINTMENT (OUTPATIENT)
Dept: PRIMARY CARE | Facility: CLINIC | Age: 39
End: 2024-04-04
Payer: MEDICAID

## 2024-04-04 ENCOUNTER — APPOINTMENT (OUTPATIENT)
Dept: NEUROLOGY | Facility: HOSPITAL | Age: 39
End: 2024-04-04
Payer: MEDICAID

## 2024-04-17 ENCOUNTER — TELEMEDICINE (OUTPATIENT)
Dept: NEUROLOGY | Facility: CLINIC | Age: 39
End: 2024-04-17
Payer: MEDICAID

## 2024-04-17 DIAGNOSIS — G90.A POTS (POSTURAL ORTHOSTATIC TACHYCARDIA SYNDROME): Primary | ICD-10-CM

## 2024-04-17 PROCEDURE — 99204 OFFICE O/P NEW MOD 45 MIN: CPT | Performed by: NURSE PRACTITIONER

## 2024-04-17 RX ORDER — CALCIUM CARBONATE 750 MG/1
1 TABLET, CHEWABLE ORAL AS NEEDED
Qty: 1 KIT | Refills: 0 | Status: SHIPPED | OUTPATIENT
Start: 2024-04-17

## 2024-04-17 RX ORDER — PROPRANOLOL HYDROCHLORIDE 10 MG/1
10 TABLET ORAL 2 TIMES DAILY
Qty: 60 TABLET | Refills: 2 | Status: SHIPPED | OUTPATIENT
Start: 2024-04-17

## 2024-04-17 NOTE — PROGRESS NOTES
Patient being assessed today for initial evaluation of POTS.  Patient reports a diagnosis of POTS was given 2 years ago after autonomic testing was completed.  Patient also reports having a diagnosis of COVID 2 years ago and symptoms significantly became worse with that.  Reports even at rest heart rate can elevate up to 160 bpm and that will occur 6-8 times a day.  Denies loss of consciousness.  Has already been increasing fluids, increasing sodium intake, using compression clothing.  Does report struggling with activity as that raises the heart rate even further.  Would like to get some orthostatics completed, patient to keep a diary.  Reviewed the process of getting that data obtained.  With the heart rate becoming still elevated, we will try a low-dose beta-blocker titrating up to twice daily and will monitor response.  Discussed role of medicine, importance of taking medications, potential risks, benefits, and precautions to be taken.  Reviewed sleep hygiene and dietary modifications.  Follow-up in 4 to 6 weeks.    This note was created with voice recognition software and was not corrected for typographical or grammatical errors

## 2024-04-25 ENCOUNTER — APPOINTMENT (OUTPATIENT)
Dept: PRIMARY CARE | Facility: CLINIC | Age: 39
End: 2024-04-25
Payer: MEDICAID

## 2024-05-01 ENCOUNTER — OFFICE VISIT (OUTPATIENT)
Dept: PRIMARY CARE | Facility: CLINIC | Age: 39
End: 2024-05-01
Payer: MEDICAID

## 2024-05-01 VITALS — HEIGHT: 63 IN | BODY MASS INDEX: 25.09 KG/M2 | WEIGHT: 141.6 LBS

## 2024-05-01 DIAGNOSIS — F32.1 CURRENT MODERATE EPISODE OF MAJOR DEPRESSIVE DISORDER, UNSPECIFIED WHETHER RECURRENT (MULTI): ICD-10-CM

## 2024-05-01 DIAGNOSIS — F43.10 PTSD (POST-TRAUMATIC STRESS DISORDER): ICD-10-CM

## 2024-05-01 DIAGNOSIS — K58.8 OTHER IRRITABLE BOWEL SYNDROME: ICD-10-CM

## 2024-05-01 DIAGNOSIS — E53.8 VITAMIN B12 DEFICIENCY: Primary | ICD-10-CM

## 2024-05-01 DIAGNOSIS — F51.01 PRIMARY INSOMNIA: ICD-10-CM

## 2024-05-01 PROCEDURE — 99213 OFFICE O/P EST LOW 20 MIN: CPT | Performed by: FAMILY MEDICINE

## 2024-05-01 RX ORDER — DESVENLAFAXINE 50 MG/1
50 TABLET, EXTENDED RELEASE ORAL DAILY
Qty: 30 TABLET | Refills: 1 | Status: SHIPPED | OUTPATIENT
Start: 2024-05-01 | End: 2024-05-08 | Stop reason: SDUPTHER

## 2024-05-01 RX ORDER — AMITRIPTYLINE HYDROCHLORIDE 50 MG/1
50 TABLET, FILM COATED ORAL NIGHTLY
Qty: 30 TABLET | Refills: 1 | Status: SHIPPED | OUTPATIENT
Start: 2024-05-01 | End: 2024-05-15 | Stop reason: SINTOL

## 2024-05-01 RX ORDER — LANOLIN ALCOHOL/MO/W.PET/CERES
1000 CREAM (GRAM) TOPICAL DAILY
Qty: 30 TABLET | Refills: 1 | Status: SHIPPED | OUTPATIENT
Start: 2024-05-01 | End: 2025-05-01

## 2024-05-01 ASSESSMENT — PATIENT HEALTH QUESTIONNAIRE - PHQ9
SUM OF ALL RESPONSES TO PHQ9 QUESTIONS 1 AND 2: 0
2. FEELING DOWN, DEPRESSED OR HOPELESS: NOT AT ALL
1. LITTLE INTEREST OR PLEASURE IN DOING THINGS: NOT AT ALL
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
## 2024-05-08 DIAGNOSIS — F43.10 PTSD (POST-TRAUMATIC STRESS DISORDER): ICD-10-CM

## 2024-05-08 DIAGNOSIS — F32.1 CURRENT MODERATE EPISODE OF MAJOR DEPRESSIVE DISORDER, UNSPECIFIED WHETHER RECURRENT (MULTI): ICD-10-CM

## 2024-05-08 RX ORDER — DESVENLAFAXINE 50 MG/1
50 TABLET, EXTENDED RELEASE ORAL DAILY
Qty: 30 TABLET | Refills: 1 | Status: SHIPPED | OUTPATIENT
Start: 2024-05-08 | End: 2024-05-15 | Stop reason: ALTCHOICE

## 2024-05-14 ENCOUNTER — TREATMENT (OUTPATIENT)
Dept: PHYSICAL THERAPY | Facility: CLINIC | Age: 39
End: 2024-05-14
Payer: MEDICAID

## 2024-05-14 DIAGNOSIS — R53.1 WEAKNESS: Primary | ICD-10-CM

## 2024-05-14 DIAGNOSIS — Z74.09 DECREASED FUNCTIONAL MOBILITY AND ENDURANCE: ICD-10-CM

## 2024-05-14 DIAGNOSIS — G90.A POTS (POSTURAL ORTHOSTATIC TACHYCARDIA SYNDROME): ICD-10-CM

## 2024-05-14 PROCEDURE — 97113 AQUATIC THERAPY/EXERCISES: CPT | Mod: GP

## 2024-05-14 NOTE — PROGRESS NOTES
Physical Therapy Treatment      Patient Name: Alanna Del Angel  MRN: [id number]  Today's Date: 5/16/2024  Visit # 2  Time Calculation  Start Time: 1050  Stop Time: 1115  Time Calculation (min): 25 min    Insurance:  2024 [employer] AFTER 30 VS VERNELL YR     Assessment:  Pt tolerated aquatic therapy session well without complications.  Occasional cueing for decreased ROM due to hsx of dislocations (EDS).  Spinal extensor stretch periodically between exercises to help with pain modulation in which the pt had a favorable response.  Planning on working in static water next session, and incorporating DW.  Pt is making progress towards goals and would continue to benefit from skilled PT at this time.        Aquatic PT is required due to, buoyancy of water reduced weight bearing and decreased LE and spinal loading, warm water provides a thermal effect which can alter tone, muscle spasm and pain, and more freedom of movement and promotes improved ability to perform functional tasks.  The viscosity of water which is more than 700x that of air, allowed increased reaction time, in turn allowing advanced balance activities to be safely performed and allow patient to be challenged beyond limited of stability without fear of falling; provides the opportunity to work on balance in a safe environment.  The hydrostatic pressure of water facilitates the reduction of edema in the lower extremities, allowing for greater ease of movement.  Aquatics d/t cardio vascular benefits including: improved cardiovascular efficiency including increased stroke volume, decreased HR, and decreased blood pressure with increased cardiac output allowing patient to achieve cardiovascular training effect with less work load.    Patient's response to session: Decreased pain and Increased ROM/joint mobility    Plan:  Aquatic therapy:       Spinal decompression   Deep water hang   DLS, core strengthening.     Current Problem:   1. Weakness        2. POTS  "(postural orthostatic tachycardia syndrome)        3. Decreased functional mobility and endurance            Subjective   pronouns THEY/THEM    Pt reports, \"I have tried aquatic therapy in the past and had some relief from it, but I haven't really been doing a lot lately, my walking tolerance is ~a block, able to go to the corner store but it starts to become painful, I stop and take breaks when the pain starts elevating. I haven't been very physically active lately because of the pain. I think that I really need to work on my core\"    Pain: 6/10      Treatments:  Aquatic Therapy (08262):   25 minutes  Forward walkin laps with resistance  1x against resistance  Backwards walkin laps with resistance   Trial of backwards walking against resistance: .5 lap tolerable but painful   Mini Squats: 2 x 10:  Spinal extensor stretch in between sets, slight increase in irritation (decreased as exercise progressed).   Hip Circles:   Cueing for decreased AROM (pt prone to dislocation) .   1 x 10 x B (CW/CCW).   Hip ABD  2 x 10 x B  B UE support via pool wall  Hip FLX:  2 x 10 x B  Standing Bicycle kicks:  2 x 10 x B  1 UE support via railing.   Heel Raise:  2 x 10  Mod Spinal Extensor Stretch:  ~2 min  Used periodically throughout session between interventions    Education and discussion on HEP and treatment regarding the benefits related to current condition, POC, pathophysiology, and precautions    *added to HEP  "

## 2024-05-15 ENCOUNTER — TELEMEDICINE (OUTPATIENT)
Dept: BEHAVIORAL HEALTH | Facility: CLINIC | Age: 39
End: 2024-05-15
Payer: MEDICAID

## 2024-05-15 ENCOUNTER — APPOINTMENT (OUTPATIENT)
Dept: NEUROLOGY | Facility: HOSPITAL | Age: 39
End: 2024-05-15
Payer: MEDICAID

## 2024-05-15 DIAGNOSIS — F41.1 GAD (GENERALIZED ANXIETY DISORDER): ICD-10-CM

## 2024-05-15 DIAGNOSIS — F43.10 PTSD (POST-TRAUMATIC STRESS DISORDER): Primary | ICD-10-CM

## 2024-05-15 PROCEDURE — 99214 OFFICE O/P EST MOD 30 MIN: CPT | Performed by: PSYCHIATRY & NEUROLOGY

## 2024-05-15 RX ORDER — DESVENLAFAXINE SUCCINATE 25 MG/1
25 TABLET, EXTENDED RELEASE ORAL DAILY
Qty: 30 TABLET | Refills: 1 | Status: SHIPPED | OUTPATIENT
Start: 2024-05-15 | End: 2024-07-14

## 2024-05-15 NOTE — PROGRESS NOTES
Adult Ambulatory Psychiatry Progress Note      Assessment/Plan     Impression:  Alanna Del Angel is a 38 y.o. nonbinary domiciled alone, seeking disability who presents for follow up with CC of PTSD (Post-Traumatic Stress Disorder) and Anxiety. Reviewed notes and labs of other providers.       Plan:   PTSD and anxiety - start pristiq 25mg daily, c/w individual therapy, c/w med ed, psycho ed, supportive psychotherapy to build therapeutic alliance, f/u 3 months         Subjective   HPI:  Pt arrived on time. They were seeing Dr Garcia for medicines and discussed that they would like to return to [facility] for [facility]. They were changed to pristiq but hasn't started it yet. They don't feel depressed. Denies SI in over 8 months. Their issues are more related to PTSD. They're also pursuing testing to help them with disability case. Chronic pain remains a big factor to their mood. Discussed an IOP for patients with chronic pain.          Review of Systems   Psychiatric/Behavioral:  Negative for dysphoric mood and suicidal ideas. The patient is nervous/anxious.      Objective   Mental Status Exam:  General Appearance: Well groomed, appropriate eye contact  Attitude/Behavior: Cooperative  Motor: No psychomotor agitation or retardation, no tremor or other abnormal movements  Speech: Normal rate, volume, prosody  Mood: anxious  Affect: Dysphoric, constricted but reactive  Thought Process: Linear, goal directed  Thought Associations: No loosening of associations  Thought Content: Normal  Perception: No perceptual abnormalities noted  Insight: Intact  Judgement: Intact    Vitals:  There were no vitals filed for this visit.    Current Medications:  Current Outpatient Medications on File Prior to Visit   Medication Sig Dispense Refill    acyclovir (Zovirax) 400 mg tablet Take 1 tablet (400 mg) by mouth 2 times a day. 90 tablet 3    Bacillus coagulans (Digestive Advantage Probio-Pre) 800 million cell tablet Take 1 tablet by  mouth once daily. 90 tablet 3    blood pressure test kit-medium kit 1 kit if needed (POTS). 1 kit 0    cyanocobalamin (Vitamin B-12) 1,000 mcg tablet Take 1 tablet (1,000 mcg) by mouth once daily. 30 tablet 1    Digestive Advantage Probiotic 2 billion cell- 140 mg capsule Take 1 capsule by mouth once daily.      ergocalciferol (Vitamin D-2) 1.25 MG (67490 UT) capsule Take 1 capsule (50,000 Units) by mouth 1 (one) time per week. 4 capsule 1    leucovorin (Wellcovorin) 5 mg tablet Take 1 tablet (5 mg total) by mouth once daily.  Take with a full glass of water. 30 tablet 1    loratadine (Claritin Reditabs) 10 mg disintegrating tablet Take 1 tablet (10 mg) by mouth if needed for allergies.      norethindrone (Micronor) 0.35 mg tablet Take 1 tablet (0.35 mg) by mouth once daily. 90 tablet 4    propranolol (Inderal) 10 mg tablet Take 1 tablet (10 mg) by mouth 2 times a day. 60 tablet 2    pyridoxine (Vitamin B-6) 50 mg tablet Take 1 tablet (50 mg) by mouth once daily. 30 tablet 11    [DISCONTINUED] amitriptyline (Elavil) 50 mg tablet Take 1 tablet (50 mg) by mouth once daily at bedtime. 30 tablet 1    [DISCONTINUED] desvenlafaxine (Pristiq) 50 mg 24 hr tablet Take 1 tablet (50 mg) by mouth once daily. Do not crush, chew, or split. 30 tablet 1     No current facility-administered medications on file prior to visit.       Lab Review:   Lab on 03/26/2024   Component Date Value    Vitamin D, 25-Hydroxy, T* 03/26/2024 26 (L)     WBC 03/26/2024 12.0 (H)     nRBC 03/26/2024 0.0     RBC 03/26/2024 4.82     Hemoglobin 03/26/2024 14.7     Hematocrit 03/26/2024 44.4     MCV 03/26/2024 92     MCH 03/26/2024 30.5     MCHC 03/26/2024 33.1     RDW 03/26/2024 12.2     Platelets 03/26/2024 379     Neutrophils % 03/26/2024 58.7     Immature Granulocytes %,* 03/26/2024 0.3     Lymphocytes % 03/26/2024 33.3     Monocytes % 03/26/2024 5.3     Eosinophils % 03/26/2024 1.6     Basophils % 03/26/2024 0.8     Neutrophils Absolute 03/26/2024 7.05      Immature Granulocytes Ab* 03/26/2024 0.03     Lymphocytes Absolute 03/26/2024 3.99     Monocytes Absolute 03/26/2024 0.64     Eosinophils Absolute 03/26/2024 0.19     Basophils Absolute 03/26/2024 0.10     Glucose 03/26/2024 92     Sodium 03/26/2024 138     Potassium 03/26/2024 4.0     Chloride 03/26/2024 103     Bicarbonate 03/26/2024 25     Anion Gap 03/26/2024 14     Urea Nitrogen 03/26/2024 9     Creatinine 03/26/2024 0.71     eGFR 03/26/2024 >90     Calcium 03/26/2024 9.3     Albumin 03/26/2024 4.3     Alkaline Phosphatase 03/26/2024 80     Total Protein 03/26/2024 7.0     AST 03/26/2024 16     Bilirubin, Total 03/26/2024 0.6     ALT 03/26/2024 15     Cholesterol 03/26/2024 177     HDL-Cholesterol 03/26/2024 51.6     Cholesterol/HDL Ratio 03/26/2024 3.4     LDL Calculated 03/26/2024 82     VLDL 03/26/2024 43 (H)     Triglycerides 03/26/2024 217 (H)     Non HDL Cholesterol 03/26/2024 125     Magnesium 03/26/2024 1.53 (L)     Triiodothyronine, Free 03/26/2024 3.4     Thyroid Stimulating Horm* 03/26/2024 1.15     Vitamin B12 03/26/2024 462     Ferritin 03/26/2024 38     Homocysteine 03/26/2024 30.75 (H)     CRP, High Sensitivity 03/26/2024 2.6 (H)        Orders:  Diagnoses and all orders for this visit:  PTSD (post-traumatic stress disorder)  -     desvenlafaxine succinate (Pristiq) 25 mg 24 hour tablet; Take 1 tablet (25 mg) by mouth once daily.  MICHEL (generalized anxiety disorder)  -     desvenlafaxine succinate (Pristiq) 25 mg 24 hour tablet; Take 1 tablet (25 mg) by mouth once daily.        Next Appointment:  Follow up in 6 weeks (on 6/26/2024).

## 2024-05-16 ENCOUNTER — APPOINTMENT (OUTPATIENT)
Dept: PRIMARY CARE | Facility: CLINIC | Age: 39
End: 2024-05-16
Payer: MEDICAID

## 2024-05-16 PROBLEM — F43.10 PTSD (POST-TRAUMATIC STRESS DISORDER): Status: ACTIVE | Noted: 2024-05-16

## 2024-05-16 ASSESSMENT — ENCOUNTER SYMPTOMS
DYSPHORIC MOOD: 0
NERVOUS/ANXIOUS: 1

## 2024-05-21 ENCOUNTER — APPOINTMENT (OUTPATIENT)
Dept: PHYSICAL THERAPY | Facility: CLINIC | Age: 39
End: 2024-05-21
Payer: MEDICAID

## 2024-05-28 ENCOUNTER — TREATMENT (OUTPATIENT)
Dept: PHYSICAL THERAPY | Facility: CLINIC | Age: 39
End: 2024-05-28
Payer: MEDICAID

## 2024-05-28 VITALS — HEART RATE: 91 BPM | SYSTOLIC BLOOD PRESSURE: 117 MMHG | DIASTOLIC BLOOD PRESSURE: 79 MMHG

## 2024-05-28 DIAGNOSIS — R53.1 WEAKNESS: Primary | ICD-10-CM

## 2024-05-28 DIAGNOSIS — Z74.09 DECREASED FUNCTIONAL MOBILITY AND ENDURANCE: ICD-10-CM

## 2024-05-28 DIAGNOSIS — G90.A POTS (POSTURAL ORTHOSTATIC TACHYCARDIA SYNDROME): ICD-10-CM

## 2024-05-28 PROCEDURE — 97113 AQUATIC THERAPY/EXERCISES: CPT | Mod: GP

## 2024-05-28 NOTE — PROGRESS NOTES
Physical Therapy Treatment      Patient Name: Alanna Del Angel  MRN: [id number]  Today's Date: 5/28/2024  Visit # 2  Time Calculation  Start Time: 1045  Stop Time: 1125  Time Calculation (min): 40 min    Insurance:    [address] AFTER 30 VS VERNE[address] YR     Assessment:  Pt tolerated aquatic therapy session well without complications.  Pt reported 3 days of post therapy fatigue.  Decreased the amount of sets completed today and increased DWH time between sets, in which the pt had a favorable response.  Discussed with pt to monitor fatigue levels after this session, after reduced reps in sets with more frequent DWH between sets.  Pts BP monitored pre and post therapy.   Pt is making progress towards goals and would continue to benefit from skilled PT at this time.        Aquatic PT is required due to, buoyancy of water reduced weight bearing and decreased LE and spinal loading, warm water provides a thermal effect which can alter tone, muscle spasm and pain, and more freedom of movement and promotes improved ability to perform functional tasks.  The viscosity of water which is more than 700x that of air, allowed increased reaction time, in turn allowing advanced balance activities to be safely performed and allow patient to be challenged beyond limited of stability without fear of falling; provides the opportunity to work on balance in a safe environment.  The hydrostatic pressure of water facilitates the reduction of edema in the lower extremities, allowing for greater ease of movement.  Aquatics d/t cardio vascular benefits including: improved cardiovascular efficiency including increased stroke volume, decreased HR, and decreased blood pressure with increased cardiac output allowing patient to achieve cardiovascular training effect with less work load.    Patient's response to session: Decreased pain and Increased ROM/joint mobility    Plan:  Aquatic therapy:       Spinal decompression   Deep water hang   DLS,  "core strengthening.     Current Problem:   1. Weakness        2. POTS (postural orthostatic tachycardia syndrome)        3. Decreased functional mobility and endurance              Subjective   pronouns THEY/THEM    Pt reports, \"I am doing pretty good today, I haven't really been doing much, so I the pain levels pretty low, however last time, I felt alright pain wise, but I was fatigued for about 3 days after\"    Pain: 6/10    BP Pre: 117/79 P: 91  Post: 111/78 P: 74    Treatments:  Aquatic Therapy (55322):   40 minutes  Forward walkin laps with resistance  Mini Squats: 1 x 10:  Spinal extensor stretch in between sets, slight increase in irritation (decreased as exercise progressed).   Hip Circles:   Cueing for decreased AROM (pt prone to dislocation) .   1 x 10 x B (CW/CCW).   Dumbbells for UE support.   Hip ABD  1 x 10 x B  B UE support via pool wall  Hip FLX:  1 x 10 x B  B dumbbells for UE support.   Standing Bicycle kicks:  1 x 10 x B  1 UE support via railing.   Heel Raise/toe  2 x 10  Mod Spinal Extensor Stretch:  ~2 min  Used periodically throughout session between interventions  DWH:  2 min dumbbells under arms, / utilized throughout the session for rest break.   Pt reported a favorable response.   Also utilized post therapy session.   DLS  Horizontal shoulder ABD/ADD: 10 x Blue Dumbbell  Shoulder ABD/ADD: 10 x Blue Dumbbell  Shoulder FLX/EXT: 10 Blue Dumbbell  Shoulder IR/ER: 10 Blue Dumbbell  Cool Down Lap:   1x lap with current as a cool down lap.     Education and discussion on HEP and treatment regarding the benefits related to current condition, POC, pathophysiology, and precautions    *added to HEP  "

## 2024-06-04 ENCOUNTER — TREATMENT (OUTPATIENT)
Dept: PHYSICAL THERAPY | Facility: CLINIC | Age: 39
End: 2024-06-04
Payer: MEDICAID

## 2024-06-04 DIAGNOSIS — G90.A POTS (POSTURAL ORTHOSTATIC TACHYCARDIA SYNDROME): ICD-10-CM

## 2024-06-04 DIAGNOSIS — Z74.09 DECREASED FUNCTIONAL MOBILITY AND ENDURANCE: ICD-10-CM

## 2024-06-04 DIAGNOSIS — R53.1 WEAKNESS: Primary | ICD-10-CM

## 2024-06-04 PROCEDURE — 97113 AQUATIC THERAPY/EXERCISES: CPT | Mod: GP

## 2024-06-04 NOTE — PROGRESS NOTES
Physical Therapy Treatment      Patient Name: Alanna Del Angel  MRN: [id number]  Today's Date: 6/4/2024  Visit # 3  Time Calculation  Start Time: 1016  Stop Time: 1045  Time Calculation (min): 29 min    Insurance:    2024 [address] [employer] AFTER 30 VS VERNELL YR     Assessment:  Pt tolerated aquatic therapy session well without complications.  Pt reported 3 days fatigue and soreness post therapy session, adjusted therapy session accordingly decreasing the amount of reps completed, and allowing for DWH with dumbbells between sets.  Pt reports increased tissue irritation with squatting or lumbar flexion intervention. Planning on following up with patient regarding change in therapy session and post exercise recovery.  Pt is making progress towards goals and would continue to benefit from skilled PT at this time.        Aquatic PT is required due to, buoyancy of water reduced weight bearing and decreased LE and spinal loading, warm water provides a thermal effect which can alter tone, muscle spasm and pain, and more freedom of movement and promotes improved ability to perform functional tasks.  The viscosity of water which is more than 700x that of air, allowed increased reaction time, in turn allowing advanced balance activities to be safely performed and allow patient to be challenged beyond limited of stability without fear of falling; provides the opportunity to work on balance in a safe environment.  The hydrostatic pressure of water facilitates the reduction of edema in the lower extremities, allowing for greater ease of movement.  Aquatics d/t cardio vascular benefits including: improved cardiovascular efficiency including increased stroke volume, decreased HR, and decreased blood pressure with increased cardiac output allowing patient to achieve cardiovascular training effect with less work load.      Plan:  Aquatic therapy:       Spinal decompression   Deep water hang   DLS, core strengthening.     Current Problem:  "  1. Weakness        2. POTS (postural orthostatic tachycardia syndrome)        3. Decreased functional mobility and endurance                Subjective   pronouns THEY/THEM    Pt reports, \"I was still sore for about 3 days after therapy last time, but today I don't feel that bad\"    Pain:     BP Pre: 110 /66  P:86  Post Therapy: 125/79 P: 85    Treatments:  Aquatic Therapy (99766):   minutes  Forward walkin laps with resistance  Mini Squats: 1 x 10:  Tissue irritation with this exercise.   HS Curls:  1 x 10 (B).   Hip Circles:   Cueing for decreased AROM (pt prone to dislocation) .   1 x 10 x B (CW/CCW).   Dumbbells for UE support.   Hip ABD  1 x 5 x B  B UE support via pool wall  Hip EXT:  1 x 10  With B UE support via pool wall.   Standing Bicycle kicks:  2 x 5 x B  1 UE support via railing.   Mod Spinal Extensor Stretch:  ~2 min  Used periodically throughout session between interventions  DWH:  2 min dumbbells under arms, / utilized throughout the session for rest break.   Pt reported a favorable response.   Also utilized post therapy session.   DLS  Horizontal shoulder ABD/ADD: 10 x Closed hands  Shoulder ABD/ADD: 10 x Closed hands  Shoulder FLX/EXT: 10 Closed hands  Shoulder IR/ER: 10 Closed hands  Cool Down Lap:   1x lap with current as a cool down lap.     Education and discussion on HEP and treatment regarding the benefits related to current condition, POC, pathophysiology, and precautions    *added to HEP  "

## 2024-06-11 ENCOUNTER — APPOINTMENT (OUTPATIENT)
Dept: PHYSICAL THERAPY | Facility: CLINIC | Age: 39
End: 2024-06-11
Payer: MEDICAID

## 2024-06-11 DIAGNOSIS — R53.1 WEAKNESS: Primary | ICD-10-CM

## 2024-06-11 DIAGNOSIS — Z74.09 DECREASED FUNCTIONAL MOBILITY AND ENDURANCE: ICD-10-CM

## 2024-06-11 DIAGNOSIS — G90.A POTS (POSTURAL ORTHOSTATIC TACHYCARDIA SYNDROME): ICD-10-CM

## 2024-06-11 NOTE — PROGRESS NOTES
"Physical Therapy Treatment      Patient Name: Alanna Del Angel  MRN: [id number]  Today's Date: 2024  Visit # 4       Insurance:     [address] AFTER 30 VS [facility] YR     Assessment:  Pt tolerated aquatic therapy session well without complications.  Pt is making progress towards goals and would continue to benefit from skilled PT at this time.        Aquatic PT is required due to, buoyancy of water reduced weight bearing and decreased LE and spinal loading, warm water provides a thermal effect which can alter tone, muscle spasm and pain, and more freedom of movement and promotes improved ability to perform functional tasks.  The viscosity of water which is more than 700x that of air, allowed increased reaction time, in turn allowing advanced balance activities to be safely performed and allow patient to be challenged beyond limited of stability without fear of falling; provides the opportunity to work on balance in a safe environment.  The hydrostatic pressure of water facilitates the reduction of edema in the lower extremities, allowing for greater ease of movement.  Aquatics d/t cardio vascular benefits including: improved cardiovascular efficiency including increased stroke volume, decreased HR, and decreased blood pressure with increased cardiac output allowing patient to achieve cardiovascular training effect with less work load.      Plan:  Aquatic therapy:       Spinal decompression   Deep water hang   DLS, core strengthening.     Current Problem:   1. Weakness        2. POTS (postural orthostatic tachycardia syndrome)        3. Decreased functional mobility and endurance                  Subjective   pronouns THEY/THEM    Pt reports, \"\"    Pain:   Fatigue:     BP Pre: BP: P:   Post Therapy: BP: P:     Treatments:  Aquatic Therapy (28791):   minutes  Forward walkin laps with resistance  Mini Squats: 1 x 10:  Tissue irritation with this exercise.   HS Curls:  1 x 10 (B).   Hip Circles:   Cueing " for decreased AROM (pt prone to dislocation) .   1 x 10 x B (CW/CCW).   Dumbbells for UE support.   Hip ABD  1 x 5 x B  B UE support via pool wall  Hip EXT:  1 x 10  With B UE support via pool wall.   Standing Bicycle kicks:  2 x 5 x B  1 UE support via railing.   Mod Spinal Extensor Stretch:  ~2 min  Used periodically throughout session between interventions  DWH:  2 min dumbbells under arms, / utilized throughout the session for rest break.   Pt reported a favorable response.   Also utilized post therapy session.   DLS  Horizontal shoulder ABD/ADD: 10 x Closed hands  Shoulder ABD/ADD: 10 x Closed hands  Shoulder FLX/EXT: 10 Closed hands  Shoulder IR/ER: 10 Closed hands  Cool Down Lap:   1x lap with current as a cool down lap.     Education and discussion on HEP and treatment regarding the benefits related to current condition, POC, pathophysiology, and precautions    *added to HEP

## 2024-06-18 ENCOUNTER — TREATMENT (OUTPATIENT)
Dept: PHYSICAL THERAPY | Facility: CLINIC | Age: 39
End: 2024-06-18
Payer: MEDICAID

## 2024-06-18 DIAGNOSIS — R53.1 WEAKNESS: Primary | ICD-10-CM

## 2024-06-18 DIAGNOSIS — G90.A POTS (POSTURAL ORTHOSTATIC TACHYCARDIA SYNDROME): ICD-10-CM

## 2024-06-18 DIAGNOSIS — Z74.09 DECREASED FUNCTIONAL MOBILITY AND ENDURANCE: ICD-10-CM

## 2024-06-18 PROCEDURE — 97113 AQUATIC THERAPY/EXERCISES: CPT | Mod: GP

## 2024-06-18 NOTE — PROGRESS NOTES
Physical Therapy Treatment      Patient Name: Alanna Del Angel  MRN: [id number]  Today's Date: 6/18/2024  Visit # 4/10  Time Calculation  Start Time: 1045  Stop Time: 1115  Time Calculation (min): 30 min    Insurance:    2024 [address] AFTER 30 VS VERNE[facility] YR     Assessment:  Pt tolerated all aquatic interventions well with fair activity tolerance.  Pt had improved recovery response time reporting decreased duration of soreness and fatigue.  Planning on slowly increasing pts tolerance over time.  DWH with dumbbells after different sets, to allow for tissue relaxation and distraction force along spinal segments.  Movements such as hip ABD, mini squats, and hip EXT are still irritating requiring DWH after reps completed.    Pt tolerated aquatic therapy session well without complications.  Pt is making progress towards goals and would continue to benefit from skilled PT at this time.        Aquatic PT is required due to, buoyancy of water reduced weight bearing and decreased LE and spinal loading, warm water provides a thermal effect which can alter tone, muscle spasm and pain, and more freedom of movement and promotes improved ability to perform functional tasks.  The viscosity of water which is more than 700x that of air, allowed increased reaction time, in turn allowing advanced balance activities to be safely performed and allow patient to be challenged beyond limited of stability without fear of falling; provides the opportunity to work on balance in a safe environment.  The hydrostatic pressure of water facilitates the reduction of edema in the lower extremities, allowing for greater ease of movement.  Aquatics d/t cardio vascular benefits including: improved cardiovascular efficiency including increased stroke volume, decreased HR, and decreased blood pressure with increased cardiac output allowing patient to achieve cardiovascular training effect with less work load.      Plan:  Aquatic therapy:       Spinal  "decompression   Deep water hang   DLS, core strengthening.     Current Problem:   1. Weakness        2. POTS (postural orthostatic tachycardia syndrome)        3. Decreased functional mobility and endurance                    Subjective   pronouns THEY/THEM    Pt reports, \"I felt better after last aquatic session, I was sore for about a day and a half as opposed to the previous session week fatigue was also more manageable\"    Pain: 6/10 (Pre therapy).   Post therapy:   Fatigue:     BP Pre: BP:110/69 P: 100  Post Therapy: BP: P:     Treatments:  Aquatic Therapy (42656):   minutes  Forward walkin laps with resistance,   With blue dumbbells.   Mini Squats: 1 x 10:  Tissue irritation with this exercise.   HS Curls:  1 x 10 (B).   Hip Circles:   Cueing for decreased AROM (pt prone to dislocation) .   1 x 10 x B (CW/CCW).   Dumbbells for UE support.   Hip ABD  1 x 5 x B  B UE support via pool wall  Hip EXT:  1 x 5 (B)  With B UE support via pool wall.   Increases irritation.   Standing Bicycle kicks:  2 x 5 x B  1 UE support via railing.   Heel raise  20 with B UE support via railing.   Mod Spinal Extensor Stretch:  ~2 min  Used periodically throughout session between interventions  DWH:  2 min dumbbells under arms, / utilized throughout the session for rest break.   Pt reported a favorable response.   Also utilized post therapy session.   DLS  Horizontal shoulder ABD/ADD: 10 x Closed hands  Shoulder ABD/ADD: 10 x Closed hands  Shoulder FLX/EXT: 10 Closed hands  Shoulder IR/ER: 10 Closed hands  Cool Down Lap:   Floating with Blue dumbbells   2x laps.     Education and discussion on HEP and treatment regarding the benefits related to current condition, POC, pathophysiology, and precautions    *added to HEP  "

## 2024-06-25 ENCOUNTER — APPOINTMENT (OUTPATIENT)
Dept: PHYSICAL THERAPY | Facility: CLINIC | Age: 39
End: 2024-06-25
Payer: MEDICAID

## 2024-06-25 NOTE — PROGRESS NOTES
"Physical Therapy Treatment      Patient Name: Alanna Del Angel  MRN: [id number]  Today's Date: 2024  Visit # 5/10 (30 total per year)       Insurance:     [address] AFTER 30 VS [facility]NE[facility] YR     Assessment:    Pt tolerated aquatic therapy session well without complications.  Pt is making progress towards goals and would continue to benefit from skilled PT at this time.        Aquatic PT is required due to, buoyancy of water reduced weight bearing and decreased LE and spinal loading, warm water provides a thermal effect which can alter tone, muscle spasm and pain, and more freedom of movement and promotes improved ability to perform functional tasks.  The viscosity of water which is more than 700x that of air, allowed increased reaction time, in turn allowing advanced balance activities to be safely performed and allow patient to be challenged beyond limited of stability without fear of falling; provides the opportunity to work on balance in a safe environment.  The hydrostatic pressure of water facilitates the reduction of edema in the lower extremities, allowing for greater ease of movement.  Aquatics d/t cardio vascular benefits including: improved cardiovascular efficiency including increased stroke volume, decreased HR, and decreased blood pressure with increased cardiac output allowing patient to achieve cardiovascular training effect with less work load.      Plan:  Aquatic therapy:       Spinal decompression   Deep water hang   DLS, core strengthening.     Current Problem:   No diagnosis found.              Subjective   pronouns THEY/THEM    Pt reports, \" \"    Pain: 6/10 (Pre therapy).   Post therapy:   Fatigue:     BP Pre: BP: /  P:   Post Therapy: BP: P:     Treatments:  Aquatic Therapy (49524):   minutes  Forward walkin laps with resistance,   With blue dumbbells.   Mini Squats: 1 x 10:  Tissue irritation with this exercise.   HS Curls:  1 x 10 (B).   Hip Circles:   Cueing for decreased AROM " (pt prone to dislocation) .   1 x 10 x B (CW/CCW).   Dumbbells for UE support.   Hip ABD  1 x 5 x B  B UE support via pool wall  Hip EXT:  1 x 5 (B)  With B UE support via pool wall.   Increases irritation.   Standing Bicycle kicks:  2 x 5 x B  1 UE support via railing.   Heel raise  20 with B UE support via railing.   Mod Spinal Extensor Stretch:  ~2 min  Used periodically throughout session between interventions  DWH:  2 min dumbbells under arms, / utilized throughout the session for rest break.   Pt reported a favorable response.   Also utilized post therapy session.   DLS  Horizontal shoulder ABD/ADD: 10 x Closed hands  Shoulder ABD/ADD: 10 x Closed hands  Shoulder FLX/EXT: 10 Closed hands  Shoulder IR/ER: 10 Closed hands  Cool Down Lap:   Floating with Blue dumbbells   2x laps.     Education and discussion on HEP and treatment regarding the benefits related to current condition, POC, pathophysiology, and precautions    *added to HEP

## 2024-06-26 ENCOUNTER — APPOINTMENT (OUTPATIENT)
Dept: BEHAVIORAL HEALTH | Facility: CLINIC | Age: 39
End: 2024-06-26
Payer: MEDICAID

## 2024-06-26 DIAGNOSIS — F41.1 GAD (GENERALIZED ANXIETY DISORDER): ICD-10-CM

## 2024-06-26 DIAGNOSIS — F43.10 PTSD (POST-TRAUMATIC STRESS DISORDER): ICD-10-CM

## 2024-06-26 PROCEDURE — 99214 OFFICE O/P EST MOD 30 MIN: CPT | Performed by: PSYCHIATRY & NEUROLOGY

## 2024-06-26 RX ORDER — DESVENLAFAXINE 50 MG/1
50 TABLET, EXTENDED RELEASE ORAL DAILY
Qty: 30 TABLET | Refills: 1 | Status: SHIPPED | OUTPATIENT
Start: 2024-06-26 | End: 2024-08-25

## 2024-06-26 ASSESSMENT — PATIENT HEALTH QUESTIONNAIRE - PHQ9
10. IF YOU CHECKED OFF ANY PROBLEMS, HOW DIFFICULT HAVE THESE PROBLEMS MADE IT FOR YOU TO DO YOUR WORK, TAKE CARE OF THINGS AT HOME, OR GET ALONG WITH OTHER PEOPLE: SOMEWHAT DIFFICULT
6. FEELING BAD ABOUT YOURSELF - OR THAT YOU ARE A FAILURE OR HAVE LET YOURSELF OR YOUR FAMILY DOWN: NOT AT ALL
SUM OF ALL RESPONSES TO PHQ QUESTIONS 1-9: 8
1. LITTLE INTEREST OR PLEASURE IN DOING THINGS: SEVERAL DAYS
4. FEELING TIRED OR HAVING LITTLE ENERGY: MORE THAN HALF THE DAYS
SUM OF ALL RESPONSES TO PHQ9 QUESTIONS 1 AND 2: 3
7. TROUBLE CONCENTRATING ON THINGS, SUCH AS READING THE NEWSPAPER OR WATCHING TELEVISION: SEVERAL DAYS
5. POOR APPETITE OR OVEREATING: NOT AT ALL
9. THOUGHTS THAT YOU WOULD BE BETTER OFF DEAD, OR OF HURTING YOURSELF: NOT AT ALL
8. MOVING OR SPEAKING SO SLOWLY THAT OTHER PEOPLE COULD HAVE NOTICED. OR THE OPPOSITE, BEING SO FIGETY OR RESTLESS THAT YOU HAVE BEEN MOVING AROUND A LOT MORE THAN USUAL: NOT AT ALL
2. FEELING DOWN, DEPRESSED OR HOPELESS: MORE THAN HALF THE DAYS
3. TROUBLE FALLING OR STAYING ASLEEP OR SLEEPING TOO MUCH: MORE THAN HALF THE DAYS

## 2024-06-26 ASSESSMENT — ENCOUNTER SYMPTOMS
NERVOUS/ANXIOUS: 1
DYSPHORIC MOOD: 0

## 2024-06-26 NOTE — PROGRESS NOTES
"Adult Ambulatory Psychiatry Progress Note      Assessment/Plan     Impression:  Alanna Del Angel is a 38 y.o. nonbinary domiciled alone, seeking disability who presents for follow up with CC of PTSD (Post-Traumatic Stress Disorder) and Anxiety.       Plan:   PTSD and anxiety - increase to pristiq 50mg daily, c/w individual therapy, c/w med ed, psycho ed, supportive psychotherapy to build therapeutic alliance, f/u 2 months         Subjective   HPI:  Pt arrived on time. They were able to start taking the pristiq. Denies significant SE. They notice \"a little bit\" of benefit for pain and mood. They've had some nightmares and unwelcome memories. Sleep has improved such that they're sleeping about 6 hours instead of 4 and napping less. They started aqua therapy which is helping. They're pursuing disability in court soon.           Review of Systems   Psychiatric/Behavioral:  Negative for dysphoric mood and suicidal ideas. The patient is nervous/anxious.      Objective   Mental Status Exam:  General Appearance: Well groomed, appropriate eye contact  Attitude/Behavior: Cooperative  Motor: No psychomotor agitation or retardation, no tremor or other abnormal movements  Speech: Normal rate, volume, prosody  Mood: \"ok\"  Affect: Constricted  Thought Process: Linear, goal directed  Thought Associations: No loosening of associations  Thought Content: Normal  Perception: No perceptual abnormalities noted  Insight: Intact  Judgement: Intact    Vitals:  There were no vitals filed for this visit.    Current Medications:  Current Outpatient Medications on File Prior to Visit   Medication Sig Dispense Refill    acyclovir (Zovirax) 400 mg tablet Take 1 tablet (400 mg) by mouth 2 times a day. 90 tablet 3    Bacillus coagulans (Digestive Advantage Probio-Pre) 800 million cell tablet Take 1 tablet by mouth once daily. 90 tablet 3    blood pressure test kit-medium kit 1 kit if needed (POTS). 1 kit 0    cyanocobalamin (Vitamin B-12) 1,000 " mcg tablet Take 1 tablet (1,000 mcg) by mouth once daily. 30 tablet 1    Digestive Advantage Probiotic 2 billion cell- 140 mg capsule Take 1 capsule by mouth once daily.      loratadine (Claritin Reditabs) 10 mg disintegrating tablet Take 1 tablet (10 mg) by mouth if needed for allergies.      norethindrone (Micronor) 0.35 mg tablet Take 1 tablet (0.35 mg) by mouth once daily. 90 tablet 4    propranolol (Inderal) 10 mg tablet Take 1 tablet (10 mg) by mouth 2 times a day. 60 tablet 2    pyridoxine (Vitamin B-6) 50 mg tablet Take 1 tablet (50 mg) by mouth once daily. 30 tablet 11    [DISCONTINUED] desvenlafaxine succinate (Pristiq) 25 mg 24 hour tablet Take 1 tablet (25 mg) by mouth once daily. 30 tablet 1     No current facility-administered medications on file prior to visit.       Lab Review:   No visits with results within 2 Month(s) from this visit.   Latest known visit with results is:   Lab on 03/26/2024   Component Date Value    Vitamin D, 25-Hydroxy, T* 03/26/2024 26 (L)     WBC 03/26/2024 12.0 (H)     nRBC 03/26/2024 0.0     RBC 03/26/2024 4.82     Hemoglobin 03/26/2024 14.7     Hematocrit 03/26/2024 44.4     MCV 03/26/2024 92     MCH 03/26/2024 30.5     MCHC 03/26/2024 33.1     RDW 03/26/2024 12.2     Platelets 03/26/2024 379     Neutrophils % 03/26/2024 58.7     Immature Granulocytes %,* 03/26/2024 0.3     Lymphocytes % 03/26/2024 33.3     Monocytes % 03/26/2024 5.3     Eosinophils % 03/26/2024 1.6     Basophils % 03/26/2024 0.8     Neutrophils Absolute 03/26/2024 7.05     Immature Granulocytes Ab* 03/26/2024 0.03     Lymphocytes Absolute 03/26/2024 3.99     Monocytes Absolute 03/26/2024 0.64     Eosinophils Absolute 03/26/2024 0.19     Basophils Absolute 03/26/2024 0.10     Glucose 03/26/2024 92     Sodium 03/26/2024 138     Potassium 03/26/2024 4.0     Chloride 03/26/2024 103     Bicarbonate 03/26/2024 25     Anion Gap 03/26/2024 14     Urea Nitrogen 03/26/2024 9     Creatinine 03/26/2024 0.71     eGFR  03/26/2024 >90     Calcium 03/26/2024 9.3     Albumin 03/26/2024 4.3     Alkaline Phosphatase 03/26/2024 80     Total Protein 03/26/2024 7.0     AST 03/26/2024 16     Bilirubin, Total 03/26/2024 0.6     ALT 03/26/2024 15     Cholesterol 03/26/2024 177     HDL-Cholesterol 03/26/2024 51.6     Cholesterol/HDL Ratio 03/26/2024 3.4     LDL Calculated 03/26/2024 82     VLDL 03/26/2024 43 (H)     Triglycerides 03/26/2024 217 (H)     Non HDL Cholesterol 03/26/2024 125     Magnesium 03/26/2024 1.53 (L)     Triiodothyronine, Free 03/26/2024 3.4     Thyroid Stimulating Horm* 03/26/2024 1.15     Vitamin B12 03/26/2024 462     Ferritin 03/26/2024 38     Homocysteine 03/26/2024 30.75 (H)     CRP, High Sensitivity 03/26/2024 2.6 (H)        Orders:  Diagnoses and all orders for this visit:  PTSD (post-traumatic stress disorder)  -     desvenlafaxine succinate (Pristiq) 50 mg 24 hr tablet; Take 1 tablet (50 mg) by mouth once daily.  MICHEL (generalized anxiety disorder)  -     desvenlafaxine succinate (Pristiq) 50 mg 24 hr tablet; Take 1 tablet (50 mg) by mouth once daily.      PHQ9  Over the past 2 weeks, how often have you been bothered by any of the following problems?  Little interest or pleasure in doing things: Several days  Feeling down, depressed, or hopeless: More than half the days  Trouble falling or staying asleep, or sleeping too much: More than half the days  Feeling tired or having little energy: More than half the days  Poor appetite or overeating: Not at all  Feeling bad about yourself - or that you are a failure or have let yourself or your family down: Not at all  Trouble concentrating on things, such as reading the newspaper or watching television: Several days  Moving or speaking so slowly that other people could have noticed? Or the opposite - being so fidgety or restless that you have been moving around a lot more than usual.: Not at all  Thoughts that you would be better off dead or hurting yourself in some way:  "Not at all  Patient Health Questionnaire-9 Score: 8    PCL-5: In the past month, how much were you bothered by:  Repeated, disturbing, and unwanted memories of the stressful experience?: Quite a bit  Repeated, disturbing dreams of the stressful experience?: Quite a bit  Suddenly feeling or acting as if the stressful experience were actually happening again (as if you were actually back there reliving it)?: A little bit  Feeling very upset when something reminded you of the stressful experience?: Extremely  Having strong physical reactions when something reminded you of the stressful experience (for example, heart pounding, trouble breathing, sweating)?: Extremely  Avoiding memories, thoughts, or feelings related to the stressful experience?: Moderately  Avoiding external reminders of the stressful experience (for example, people, places, conversations, activities, objects, or situations)?: A little bit  Trouble remembering important parts of the stressful experience?: Quite a bit  Having strong negative beliefs about yourself, other people, or the world (for example, having thoughts such as: I am bad, there is something seriously wrong with me, no one can be trusted, the world is completely dangerous)?: Moderately  Blaming yourself or someone else for the stressful experience or what happened after it?: Extremely  Having strong negative feelings such as fear, horror, anger, guilt, or shame?: Quite a bit  Loss of interest in activities that you used to enjoy?: A little bit  Feeling distant or cut off from other people?: Extremely  Trouble experiencing positive feelings (for example, being unable to feel happiness or have loving feelings for people close to you)?: Not at all  Irritable behavior, angry outbursts, or acting aggressively?: Moderately  Taking too many risks or doing things that could cause you harm?: Not at all  Being \"superalert\" or watchful or on guard?: Quite a bit  Feeling jumpy or easily startled?: A " little bit  Having difficulty concentrating?: Extremely  Trouble falling or staying asleep?: Moderately  Post-Traumatic Stress Disorder Total Score: 47      Next Appointment:  Follow up in 2 months (on 8/29/2024).

## 2024-07-02 ENCOUNTER — TREATMENT (OUTPATIENT)
Dept: PHYSICAL THERAPY | Facility: CLINIC | Age: 39
End: 2024-07-02
Payer: MEDICAID

## 2024-07-02 DIAGNOSIS — R53.1 WEAKNESS: Primary | ICD-10-CM

## 2024-07-02 DIAGNOSIS — Z74.09 DECREASED FUNCTIONAL MOBILITY AND ENDURANCE: ICD-10-CM

## 2024-07-02 DIAGNOSIS — G90.A POTS (POSTURAL ORTHOSTATIC TACHYCARDIA SYNDROME): ICD-10-CM

## 2024-07-02 PROCEDURE — 97113 AQUATIC THERAPY/EXERCISES: CPT | Mod: GP

## 2024-07-02 NOTE — PROGRESS NOTES
Physical Therapy Treatment      Patient Name: Alanna Del Angel  MRN: [id number]  Today's Date: 7/2/2024  Visit # 5 (30 total per year)  Time Calculation  Start Time: 1045  Stop Time: 1119  Time Calculation (min): 34 min    Insurance:    [address] AFTER 30 VS VERNELL YR     Assessment:  pt tolerated aquatic therapy session well without complications. Pt still experiencing fatigue post therapy sessions, as well as moderate pain, continuing to monitor levels.  Squatting still elevates pts pain levels, DWH helps reduce irritation levels.  Pt is making progress towards goals and would continue to benefit from skilled PT at this time.        Aquatic PT is required due to, buoyancy of water reduced weight bearing and decreased LE and spinal loading, warm water provides a thermal effect which can alter tone, muscle spasm and pain, and more freedom of movement and promotes improved ability to perform functional tasks.  The viscosity of water which is more than 700x that of air, allowed increased reaction time, in turn allowing advanced balance activities to be safely performed and allow patient to be challenged beyond limited of stability without fear of falling; provides the opportunity to work on balance in a safe environment.  The hydrostatic pressure of water facilitates the reduction of edema in the lower extremities, allowing for greater ease of movement.  Aquatics d/t cardio vascular benefits including: improved cardiovascular efficiency including increased stroke volume, decreased HR, and decreased blood pressure with increased cardiac output allowing patient to achieve cardiovascular training effect with less work load.      Plan:  Aquatic therapy:       Spinal decompression   Deep water hang   DLS, core strengthening.     Current Problem:   1. Weakness        2. POTS (postural orthostatic tachycardia syndrome)        3. Decreased functional mobility and endurance            Subjective   pronouns  "THEY/THEM    Pt reports, \"I have been okay, about my normal pain level 6/10.\"    Pain: 6/10 (Pre therapy).   Post therapy:   Fatigue:     BP Pre: BP: 110/80  P:   Post Therapy: BP: 116/84 P: 81     Treatments:  Aquatic Therapy (95561):   minutes  Forward walkin laps with resistance, (pushing and pulling)  With blue dumbbells.   Mini Squats: 1 x 10:  Tissue irritation with this exercise   Forward March:   1 UE support. (Increased irritation)  1 laps.   Lateral walking:   In still water   3 laps.     HS Curls:  1 x 10 (B).   Hip Circles:   Cueing for decreased AROM (pt prone to dislocation) .   1 x 10 x B (CW/CCW).   Dumbbells for UE support.   Hip ABD  1 x 5 x B  B UE support via pool wall  Standing Bicycle kicks:  2 x 5 x B  1 UE support via railing.   Heel raise  20 with B UE support via railing.   Mod Spinal Extensor Stretch:  ~2 min  Used periodically throughout session between interventions  DWH:  2 min dumbbells under arms, / utilized throughout the session for rest break.   Pt reported a favorable response.   Also utilized post therapy session.   DLS  Horizontal shoulder ABD/ADD: 10 x Blue dumbbells   Shoulder ABD/ADD: 10 x Blue dumbbells   Shoulder FLX/EXT: 10 Blue dumbbells   Shoulder IR/ER: 10 Closed hands  Cool Down Lap:   Floating with Blue dumbbells   2x laps.     Education and discussion on HEP and treatment regarding the benefits related to current condition, POC, pathophysiology, and precautions    *added to HEP  "

## 2024-07-09 ENCOUNTER — TREATMENT (OUTPATIENT)
Dept: PHYSICAL THERAPY | Facility: CLINIC | Age: 39
End: 2024-07-09
Payer: MEDICAID

## 2024-07-09 DIAGNOSIS — Z74.09 DECREASED FUNCTIONAL MOBILITY AND ENDURANCE: ICD-10-CM

## 2024-07-09 DIAGNOSIS — G90.A POTS (POSTURAL ORTHOSTATIC TACHYCARDIA SYNDROME): ICD-10-CM

## 2024-07-09 DIAGNOSIS — R53.1 WEAKNESS: Primary | ICD-10-CM

## 2024-07-09 PROCEDURE — 97113 AQUATIC THERAPY/EXERCISES: CPT | Mod: GP

## 2024-07-09 NOTE — PROGRESS NOTES
Physical Therapy Treatment      Patient Name: Alanna Del Angel  MRN: [id number]  Today's Date: 7/9/2024  Visit #6 (30 total per year)  Time Calculation  Start Time: 1045  Stop Time: 1115  Time Calculation (min): 30 min    Insurance:    2024 [address] [employer] AFTER 30 VS VERNELL YR     Assessment:  Pt tolerated all interventions well.  Pt is demoing improved activity tolerance with reduction in irritation.  Still incorporating DWH, and rest breaks to minimize affects of fatigue post therapy.  Pt benefited from the deeper water especially with DLS, planning to continue in deeper water next session.  Pt is making progress towards goals and would continue to benefit from skilled PT at this time.     Aquatic PT is required due to, buoyancy of water reduced weight bearing and decreased LE and spinal loading, warm water provides a thermal effect which can alter tone, muscle spasm and pain, and more freedom of movement and promotes improved ability to perform functional tasks.  The viscosity of water which is more than 700x that of air, allowed increased reaction time, in turn allowing advanced balance activities to be safely performed and allow patient to be challenged beyond limited of stability without fear of falling; provides the opportunity to work on balance in a safe environment.  The hydrostatic pressure of water facilitates the reduction of edema in the lower extremities, allowing for greater ease of movement.  Aquatics d/t cardio vascular benefits including: improved cardiovascular efficiency including increased stroke volume, decreased HR, and decreased blood pressure with increased cardiac output allowing patient to achieve cardiovascular training effect with less work load.      Plan:  Aquatic therapy:       Spinal decompression   Deep water hang   DLS, core strengthening.     Current Problem:   1. Weakness        2. POTS (postural orthostatic tachycardia syndrome)        3. Decreased functional mobility and  "endurance              Subjective   pronouns THEY/THEM    Pt reports, \"I have been okay, my birthday was nice. And I felt decent afterwards. \"    Pain: 5/10 (Pre therapy).     Fatigue:     Post Therapy BP: 126/88 P: 83.     Treatments:  Aquatic Therapy (88803):   26 minutes  Forward walkin laps w/o resistance, (pushing and pulling)  Forward Marchin lap  Slight irritation, DWH afterwards  Mini Squats: 1 x 10:  Tissue irritation with this exercise   HS Curls:  1 x 10 (B).   Hip Circles:   Cueing for decreased AROM (pt prone to dislocation) .   1 x 10 x B (CW/CCW).   Dumbbells for UE support.   Hip ABD  1 x 5 x B  B UE support via pool wall  Standing Bicycle kicks:  2 x 5 x B  1 UE support via railing.   Heel raise  20 with B UE support via railing.   Mod Spinal Extensor Stretch:  ~2 min  Used periodically throughout session between interventions  DWH:  2 min dumbbells under arms, / utilized throughout the session for rest break.   Pt reported a favorable response.   Also utilized post therapy session.   Deep water hip ABD  10 (irritation).   Deep water bicycle  20   DLS  Horizontal shoulder ABD/ADD: 20 x Blue dumbbells   Shoulder ABD/ADD: 20 x Blue dumbbells   Shoulder FLX/EXT: 10 Blue dumbbells   Shoulder IR/ER: 10 Closed hands  Cool Down Lap:   Floating with Blue dumbbells   2x laps.     Education and discussion on HEP and treatment regarding the benefits related to current condition, POC, pathophysiology, and precautions    *added to HEP  "

## 2024-07-10 ENCOUNTER — LAB (OUTPATIENT)
Dept: LAB | Facility: LAB | Age: 39
End: 2024-07-10
Payer: MEDICAID

## 2024-07-10 ENCOUNTER — CONSULT (OUTPATIENT)
Dept: ALLERGY | Facility: HOSPITAL | Age: 39
End: 2024-07-10
Payer: MEDICAID

## 2024-07-10 VITALS
WEIGHT: 141.98 LBS | BODY MASS INDEX: 25.16 KG/M2 | HEIGHT: 63 IN | SYSTOLIC BLOOD PRESSURE: 126 MMHG | TEMPERATURE: 99 F | HEART RATE: 85 BPM | DIASTOLIC BLOOD PRESSURE: 83 MMHG

## 2024-07-10 DIAGNOSIS — J31.0 CHRONIC RHINITIS: ICD-10-CM

## 2024-07-10 DIAGNOSIS — T78.1XXA ADVERSE FOOD REACTION, INITIAL ENCOUNTER: ICD-10-CM

## 2024-07-10 DIAGNOSIS — T78.1XXA ADVERSE FOOD REACTION, INITIAL ENCOUNTER: Primary | ICD-10-CM

## 2024-07-10 DIAGNOSIS — J45.30 MILD PERSISTENT ASTHMA WITHOUT COMPLICATION (HHS-HCC): ICD-10-CM

## 2024-07-10 DIAGNOSIS — R53.82 CHRONIC FATIGUE: ICD-10-CM

## 2024-07-10 DIAGNOSIS — R10.84 GENERALIZED ABDOMINAL PAIN: ICD-10-CM

## 2024-07-10 DIAGNOSIS — R19.7 DIARRHEA, UNSPECIFIED TYPE: ICD-10-CM

## 2024-07-10 PROCEDURE — 86003 ALLG SPEC IGE CRUDE XTRC EA: CPT

## 2024-07-10 PROCEDURE — 99205 OFFICE O/P NEW HI 60 MIN: CPT | Performed by: ALLERGY & IMMUNOLOGY

## 2024-07-10 PROCEDURE — 99215 OFFICE O/P EST HI 40 MIN: CPT | Performed by: ALLERGY & IMMUNOLOGY

## 2024-07-10 PROCEDURE — 36415 COLL VENOUS BLD VENIPUNCTURE: CPT

## 2024-07-10 PROCEDURE — 83520 IMMUNOASSAY QUANT NOS NONAB: CPT

## 2024-07-10 RX ORDER — CETIRIZINE HYDROCHLORIDE 10 MG/1
10 TABLET ORAL DAILY PRN
Qty: 30 TABLET | Refills: 11 | Status: SHIPPED | OUTPATIENT
Start: 2024-07-10 | End: 2025-07-10

## 2024-07-10 ASSESSMENT — ENCOUNTER SYMPTOMS
EYES NEGATIVE: 1
ABDOMINAL PAIN: 1
RESPIRATORY NEGATIVE: 1
HEADACHES: 1
ARTHRALGIAS: 1
ALLERGIC/IMMUNOLOGIC NEGATIVE: 1
FATIGUE: 1
CARDIOVASCULAR NEGATIVE: 1
HEMATOLOGIC/LYMPHATIC NEGATIVE: 1
MYALGIAS: 1

## 2024-07-10 NOTE — LETTER
July 10, 2024     Ciarra Garcia MD  [address] [address]    Patient: Alanna Del Angel   YOB: 1985   Date of Visit: 7/10/2024       Dear Dr. Ciarra Garcia MD:    Thank you for referring Alanna Del Angel to me for evaluation. Below are my notes for this consultation.  If you have questions, please do not hesitate to call me. I look forward to following your patient along with you.       Sincerely,     Princess ANKUSH Valenzuela MD      CC: No Recipients  ______________________________________________________________________________________    Alanna Del Angel presents for initial evaluation today.      Alanna Del Angel was seen at the request of Ciarra Garcia MD for a chief complaint of concern for allergy; a report with my findings is being sent via written or electronic means to Ciarra Garcia MD with my recommendations for treatment    They report that over the past few years, they have developed food intolerances.  They note that they had COVID in 2021 or 2022, and food intolerances worsened after this.  They note intolerances to lunch meat, vinegar, canned foods, strawberries, oat, wheat, tomatoes, preservatives.  Eating foods that are fresh and organic tend to be more well-tolerated.    After consumption of the culprit foods, they note that they get urgent diarrhea, mucous in stool, then feels tired, faint, sweaty, feels hot.   The symptoms are better with laying down.  The diarrhea lasts 12-24 hours.  They have been seen by GI in the past and evaluation for celiac was negative however hiatal hernia was found.  They have not had a colonoscopy.      They have not experienced hives, swelling, anaphylaxis to any foods.     They note frequent general body itching.  Takes famotidine and loratadine.  Helps with itching and can tolerate some dairy when using.  They also use Benadryl as needed.      They take ibuprofen a couple a month     Rhinitis: Denies     Asthma:  "exercise-induced asthma diagnosed age 15-16.  They are not on a controller medication.  They require albuterol 1-2 times weekly.  They have not had PFTs.      Eczema: Denies, but has psoriasis of the scalp controlled with charcoal shampoo    Venom allergy:  Stung by a bee and had large local swelling     Drug allergy: Codeine causes hives     Environmental History:  Type of home:   [facility]  Pets in the house: Dog   Mold or moisture in the home: None  Cigarette exposure in the home:  Yes   Occupation/School: Not currently employed     Pertinent Allergy/Immunology family history:  Paternal grandmother with pernicious anemia     Review of Systems   Constitutional:  Positive for fatigue.   HENT: Negative.     Eyes: Negative.    Respiratory: Negative.     Cardiovascular: Negative.    Gastrointestinal:  Positive for abdominal pain.   Musculoskeletal:  Positive for arthralgias and myalgias.   Skin: Negative.    Allergic/Immunologic: Negative.    Neurological:  Positive for headaches.   Hematological: Negative.         Vital signs:  /83 (BP Location: Left arm, Patient Position: Sitting)   Pulse 85   Temp 37.2 °C (99 °F) (Oral)   Ht 1.605 m (5' 3.19\")   Wt 64.4 kg (141 lb 15.6 oz)   BMI 25.00 kg/m²     Physical Exam:  GENERAL: Alert, oriented and in no acute distress.     HEENT: EYES: No conjunctival injection or cobblestoning. Nose: nasal turbinates are not edematous and are not boggy.  There is no mucous stranding, polyps, or blood    noted. EARS: Tympanic membranes are clear. MOUTH: moist and pink with no exudates, ulcers, or thrush. NECK: is supple, without adenopathy.  No upper airway stridor noted.       HEART: regular rate and rhythm.       LUNGS: Clear to auscultation bilaterally. No wheezing, rhonchi or rales.        ABDOMEN: Positive bowel sounds, soft, nontender, nondistended.       EXTREMITIES: No clubbing or edema.       SKIN: No rash, hives, or angioedema noted    Impression:  1. Adverse food " reaction, initial encounter    2. Chronic rhinitis    3. Mild persistent asthma without complication ([facility])    4. Diarrhea, unspecified type    5. Generalized abdominal pain    6. Chronic fatigue      Assessment and Plan:  We discussed symptoms at length today.  They are concerned about possible mast cell activation syndrome (MCAS) due to symptoms of fatigue, pruritus, dysautonomia, hypermobility, and GI symptoms.  We discussed evaluation for MCAS.  Recommend baseline serum tryptase, and have placed standing order for serum tryptase that they may get drawn in the event of exacerbation of symptoms (preferably within 4 hours of symptom exacerbation).  Have also ordered urine studies to include 24-hour N-methyl histamine, and 24-hour 2, 3-Dinor 11 beta-prostaglandin F2 alpha.  Will check environmental aeroallergens specific IgE panel and check serum IgE to wheat, tomato, strawberry, oat.  Discussed difference between IgE mediated food allergy and food intolerance.  PFT's ordered to evaluate asthma.  Will make further recommendations pending results of laboratory and PFT evaluation.      Recommend using cetirizine 10 mg once to twice daily, and may continue famotidine    Referred to COVID recovery clinic for concerns of long COVID    Plan for follow-up in 6 months or sooner if needed

## 2024-07-10 NOTE — PATIENT INSTRUCTIONS
It was nice to meet you today    You may use Cetirizine (Zyrtec) 10 mg once to twice daily as needed instead of loratadine     Please have labs drawn. Please note that some labs will come back sooner than others.  We will contact you when all labs have returned so that we can discuss results.     Please have pulmonary function testing performed.  The phone number is [phone]    We have referred you to GI and the [facility] recovery clinic     We would like to see you in follow up in 6 months or sooner if needed

## 2024-07-10 NOTE — PROGRESS NOTES
Alanna Del Angel presents for initial evaluation today.      Alanna Del Angel was seen at the request of Ciarra Garcia MD for a chief complaint of concern for allergy; a report with my findings is being sent via written or electronic means to Ciarra Garcia MD with my recommendations for treatment    They report that over the past few years, they have developed food intolerances.  They note that they had COVID in 2021 or 2022, and food intolerances worsened after this.  They note intolerances to lunch meat, vinegar, canned foods, strawberries, oat, wheat, tomatoes, preservatives.  Eating foods that are fresh and organic tend to be more well-tolerated.    After consumption of the culprit foods, they note that they get urgent diarrhea, mucous in stool, then feels tired, faint, sweaty, feels hot.   The symptoms are better with laying down.  The diarrhea lasts 12-24 hours.  They have been seen by GI in the past and evaluation for celiac was negative however hiatal hernia was found.  They have not had a colonoscopy.      They have not experienced hives, swelling, anaphylaxis to any foods.     They note frequent general body itching.  Takes famotidine and loratadine.  Helps with itching and can tolerate some dairy when using.  They also use Benadryl as needed.      They take ibuprofen a couple a month     Rhinitis: Denies     Asthma: exercise-induced asthma diagnosed age 15-16.  They are not on a controller medication.  They require albuterol 1-2 times weekly.  They have not had PFTs.      Eczema: Denies, but has psoriasis of the scalp controlled with charcoal shampoo    Venom allergy:  Stung by a bee and had large local swelling     Drug allergy: Codeine causes hives     Environmental History:  Type of home:   camper  Pets in the house: Dog   Mold or moisture in the home: None  Cigarette exposure in the home:  Yes   Occupation/School: Not currently employed     Pertinent Allergy/Immunology family  "history:  Paternal grandmother with pernicious anemia     Review of Systems   Constitutional:  Positive for fatigue.   HENT: Negative.     Eyes: Negative.    Respiratory: Negative.     Cardiovascular: Negative.    Gastrointestinal:  Positive for abdominal pain.   Musculoskeletal:  Positive for arthralgias and myalgias.   Skin: Negative.    Allergic/Immunologic: Negative.    Neurological:  Positive for headaches.   Hematological: Negative.         Vital signs:  /83 (BP Location: Left arm, Patient Position: Sitting)   Pulse 85   Temp 37.2 °C (99 °F) (Oral)   Ht 1.605 m (5' 3.19\")   Wt 64.4 kg (141 lb 15.6 oz)   BMI 25.00 kg/m²     Physical Exam:  GENERAL: Alert, oriented and in no acute distress.     HEENT: EYES: No conjunctival injection or cobblestoning. Nose: nasal turbinates are not edematous and are not boggy.  There is no mucous stranding, polyps, or blood    noted. EARS: Tympanic membranes are clear. MOUTH: moist and pink with no exudates, ulcers, or thrush. NECK: is supple, without adenopathy.  No upper airway stridor noted.       HEART: regular rate and rhythm.       LUNGS: Clear to auscultation bilaterally. No wheezing, rhonchi or rales.        ABDOMEN: Positive bowel sounds, soft, nontender, nondistended.       EXTREMITIES: No clubbing or edema.       SKIN: No rash, hives, or angioedema noted    Impression:  1. Adverse food reaction, initial encounter    2. Chronic rhinitis    3. Mild persistent asthma without complication ([facility])    4. Diarrhea, unspecified type    5. Generalized abdominal pain    6. Chronic fatigue      Assessment and Plan:  We discussed symptoms at length today.  They are concerned about possible mast cell activation syndrome (MCAS) due to symptoms of fatigue, pruritus, dysautonomia, hypermobility, and GI symptoms.  We discussed evaluation for MCAS.  Recommend baseline serum tryptase, and have placed standing order for serum tryptase that they may get drawn in the event of " exacerbation of symptoms (preferably within 4 hours of symptom exacerbation).  Have also ordered urine studies to include 24-hour N-methyl histamine, and 24-hour 2, 3-Dinor 11 beta-prostaglandin F2 alpha.  Will check environmental aeroallergens specific IgE panel and check serum IgE to wheat, tomato, strawberry, oat.  Discussed difference between IgE mediated food allergy and food intolerance.  PFT's ordered to evaluate asthma.  Will make further recommendations pending results of laboratory and PFT evaluation.      Recommend using cetirizine 10 mg once to twice daily, and may continue famotidine    Referred to COVID recovery clinic for concerns of long COVID    Plan for follow-up in 6 months or sooner if needed

## 2024-07-11 LAB
OAT IGE QN: <0.1 KU/L
STRAWBERRY IGE QN: <0.1 KU/L
TOMATO IGE QN: <0.1 KU/L
WHEAT IGE QN: <0.1 KU/L

## 2024-07-12 LAB — TRYPTASE SERPL-MCNC: 6.7 UG/L

## 2024-07-13 ENCOUNTER — TELEPHONE (OUTPATIENT)
Dept: ALLERGY | Facility: CLINIC | Age: 39
End: 2024-07-13
Payer: MEDICAID

## 2024-07-13 NOTE — TELEPHONE ENCOUNTER
Please let Alanna know that allergy testing was negative to oat, tomato, wheat, strawberry, and baseline serum tryptase was normal.  We will contact them when the remainder of the mast cell labs are resulted.

## 2024-07-22 NOTE — TELEPHONE ENCOUNTER
Called and spoke to patient. Reviewed results and recommendations at this time. Patient denied questions or concerns at this time.

## 2024-07-23 ENCOUNTER — TREATMENT (OUTPATIENT)
Dept: PHYSICAL THERAPY | Facility: CLINIC | Age: 39
End: 2024-07-23
Payer: MEDICAID

## 2024-07-23 VITALS — DIASTOLIC BLOOD PRESSURE: 72 MMHG | HEART RATE: 89 BPM | SYSTOLIC BLOOD PRESSURE: 116 MMHG

## 2024-07-23 DIAGNOSIS — G90.A POTS (POSTURAL ORTHOSTATIC TACHYCARDIA SYNDROME): ICD-10-CM

## 2024-07-23 DIAGNOSIS — R53.1 WEAKNESS: Primary | ICD-10-CM

## 2024-07-23 DIAGNOSIS — Z74.09 DECREASED FUNCTIONAL MOBILITY AND ENDURANCE: ICD-10-CM

## 2024-07-23 PROCEDURE — 97113 AQUATIC THERAPY/EXERCISES: CPT | Mod: GP

## 2024-07-23 NOTE — PROGRESS NOTES
Physical Therapy Treatment      Patient Name: Alanna Del Angel  MRN: [id number]  Today's Date: 7/23/2024  Visit #7 (30 total per year)  Time Calculation  Start Time: 1045  Stop Time: 1115  Time Calculation (min): 30 min    Insurance:    2024 EL[employer] AUTH AFTER 30 VS VERNELL YR     Assessment:  Pt tolerated all interventions well this session with good tolerance and without increase in irritation.  Worked in the deep water this session to allow for increased tissue relaxation.  Still allowing for time for standing rest breaks secondary, to fatigue.  Hip EXT causes irritation, which was resolved with DWH.  Pt is making progress towards goals and would continue to benefit from skilled PT at this time.     Aquatic PT is required due to, buoyancy of water reduced weight bearing and decreased LE and spinal loading, warm water provides a thermal effect which can alter tone, muscle spasm and pain, and more freedom of movement and promotes improved ability to perform functional tasks.  The viscosity of water which is more than 700x that of air, allowed increased reaction time, in turn allowing advanced balance activities to be safely performed and allow patient to be challenged beyond limited of stability without fear of falling; provides the opportunity to work on balance in a safe environment.  The hydrostatic pressure of water facilitates the reduction of edema in the lower extremities, allowing for greater ease of movement.  Aquatics d/t cardio vascular benefits including: improved cardiovascular efficiency including increased stroke volume, decreased HR, and decreased blood pressure with increased cardiac output allowing patient to achieve cardiovascular training effect with less work load.      Plan:  Progress note next session    Aquatic therapy:       Spinal decompression   Deep water hang   DLS, core strengthening.     Current Problem:   1. Weakness        2. POTS (postural orthostatic tachycardia syndrome)       "  3. Decreased functional mobility and endurance                Subjective   pronouns THEY/THEM  Pt reports, \"I have been alright, I was only fatigued for about a day after the last session, which is better than it has been in the past. \"     Pain: 5/10 (Pre therapy).   Fatigue: 7/10    Pre Therapy BP: 117/86 P: 87  Post: BP: 116/80  P: 81    Treatments:  Aquatic Therapy (63169):   30 minutes  HS Curls:  1 x 10 (B).   Hip Circles:   Cueing for decreased AROM (pt prone to dislocation) .   1 x 10 x B (CW/CCW).   Dumbbells for UE support.   Hip ABD  1 x 10 x B  B UE support via pool wall  Standing Bicycle kicks:  2 x 5 x B  1 UE support via railing.   Hip EXT:  Sciatic irritation causing relieved with DWH.   1 x 10 (B).   Heel raise  20 with B UE support via railing.   Mod Spinal Extensor Stretch:  ~2 min  Used periodically throughout session between interventions  DWH:  2 min dumbbells under arms, and pool noodle, utilized throughout the session for rest break.   Pt reported a favorable response.   Also utilized post therapy session.   DLS  Horizontal shoulder ABD/ADD: 20 x Blue dumbbells   Shoulder ABD/ADD: 20 x Blue dumbbells   Shoulder FLX/EXT: 20 Blue dumbbells   Shoulder IR/ER: 10 Closed hands    Education and discussion on HEP and treatment regarding the benefits related to current condition, POC, pathophysiology, and precautions    *added to HEP  "

## 2024-07-30 ENCOUNTER — APPOINTMENT (OUTPATIENT)
Dept: PHYSICAL THERAPY | Facility: CLINIC | Age: 39
End: 2024-07-30
Payer: MEDICAID

## 2024-08-06 ENCOUNTER — TREATMENT (OUTPATIENT)
Dept: PHYSICAL THERAPY | Facility: CLINIC | Age: 39
End: 2024-08-06
Payer: MEDICAID

## 2024-08-06 DIAGNOSIS — G90.A POTS (POSTURAL ORTHOSTATIC TACHYCARDIA SYNDROME): Primary | ICD-10-CM

## 2024-08-06 DIAGNOSIS — R53.1 WEAKNESS: ICD-10-CM

## 2024-08-06 DIAGNOSIS — Z74.09 DECREASED FUNCTIONAL MOBILITY AND ENDURANCE: ICD-10-CM

## 2024-08-06 PROCEDURE — 97113 AQUATIC THERAPY/EXERCISES: CPT | Mod: GP

## 2024-08-06 NOTE — PROGRESS NOTES
Physical Therapy Treatment      Patient Name: Alanna Del Angel  MRN: [id number]  Today's Date: 8/6/2024  Visit #8 (30 total per year)  Time Calculation  Start Time: 0953  Stop Time: 1030  Time Calculation (min): 37 min    Insurance:    2024 EL[employer] AUTH AFTER 30 VS VERNELL YR     Assessment:  Progress note completed this session, reviewing pts personal and objective goals, assessing tests and measures, interpreting results and discussed findings with the pt.  Pt has met some goals, initially established, reporting no falls, compliance with HEP, has not improved with HEP. Pt reported 50% progress towards personal goals, reporting improved post therapy fatigue, and slight improvement in overall pain, however has not met all goals at this time.  Pt is making progress towards goals and would continue to benefit from skilled PT at this time for remaining pain, fatigue, and decreased activity tolerance secondary to POTS and fibromylagia.  Remaining time spent in water LE strengthening as well as DLS, in which the pt tolerated well.         Pt is making progress towards goals and would continue to benefit from skilled PT at this time.     Aquatic PT is required due to, buoyancy of water reduced weight bearing and decreased LE and spinal loading, warm water provides a thermal effect which can alter tone, muscle spasm and pain, and more freedom of movement and promotes improved ability to perform functional tasks.  The viscosity of water which is more than 700x that of air, allowed increased reaction time, in turn allowing advanced balance activities to be safely performed and allow patient to be challenged beyond limited of stability without fear of falling; provides the opportunity to work on balance in a safe environment.  The hydrostatic pressure of water facilitates the reduction of edema in the lower extremities, allowing for greater ease of movement.  Aquatics d/t cardio vascular benefits including: improved  "cardiovascular efficiency including increased stroke volume, decreased HR, and decreased blood pressure with increased cardiac output allowing patient to achieve cardiovascular training effect with less work load.      Plan:    Aquatic therapy:       Spinal decompression   Deep water hang   DLS, core strengthening.     Current Problem:   1. POTS (postural orthostatic tachycardia syndrome)        2. Weakness        3. Decreased functional mobility and endurance                  Subjective   pronouns THEY/THEM  Pt reports, \"I have been good, last week everyone got noro virus, I am better today though. \"    Self Perceived progress towards goals:  50% progress towards goals, fatigue has improved, post op pain has improved but still progressing towards goal.      Pain: 6/10 (Pre therapy).   Fatigue: 7/10      OBJECTIVE:  Goals:  Pt will improve FGA by 3 points to meet MASSIEL: NT this session time constraint.   Pt will demonstrate independence with HEP: MET, pt reports partial compliance, periodic.   Pt will report 0 falls during POC: Progressing, No falls.   Pt will report a decrease of average pain full body to 6/10: MET 6/10  Pt will improve LEFS by 9 points to meet MASSIEL. Progressing: pt scored 31/80      New Goals:  Pt will report a decrease of average pain full body of 3/10.   Pt will improve LEFS by 9 points to meet MASSIEL.   Pt will report 0 falls during POC: Progressing, No falls.   Pre Therapy   BP: 118/86 P:89  Post: BP: 122/87 P: 77      Treatments:  Therapeutic Activity (69182):  5 Minutes  Progress note completed this session, reviewing pts personal and objective goals, assessing tests and measures, interpreting results and discussed findings with the pt.      Aquatic Therapy (57066):  31 minutes  HS Curls:  1 x 10 (B).   Hip Circles:   Cueing for decreased AROM (pt prone to dislocation) .   1 x 10 x B (CW/CCW).   Dumbbells for UE support.   Standing Bicycle kicks:  2 x 5 x B  1 UE support via railing.   Hip " EXT  Sciatic irritation causing relieved with DWH.   1 x 10 (B).   Heel raise:  20 with B UE support via railing.   DWH:  2 min dumbbells under arms, and pool noodle, utilized throughout the session for rest break.   Pt reported a favorable response.   Also utilized post therapy session.   DLS  Shoulder ABD/ADD: 20 x Blue dumbbells   Shoulder FLX/EXT: 20 Blue dumbbells   Shoulder IR/ER: 10 Closed hands    Education and discussion on HEP and treatment regarding the benefits related to current condition, POC, pathophysiology, and precautions    *added to HEP

## 2024-08-13 ENCOUNTER — APPOINTMENT (OUTPATIENT)
Dept: PHYSICAL THERAPY | Facility: CLINIC | Age: 39
End: 2024-08-13
Payer: MEDICAID

## 2024-08-13 ENCOUNTER — TREATMENT (OUTPATIENT)
Dept: PHYSICAL THERAPY | Facility: CLINIC | Age: 39
End: 2024-08-13
Payer: MEDICAID

## 2024-08-13 DIAGNOSIS — Z74.09 OTHER REDUCED MOBILITY: ICD-10-CM

## 2024-08-13 DIAGNOSIS — R53.1 WEAKNESS: Primary | ICD-10-CM

## 2024-08-13 DIAGNOSIS — Z74.09 DECREASED FUNCTIONAL MOBILITY AND ENDURANCE: ICD-10-CM

## 2024-08-13 DIAGNOSIS — G90.A POSTURAL ORTHOSTATIC TACHYCARDIA SYNDROME (POTS): ICD-10-CM

## 2024-08-13 PROCEDURE — 97113 AQUATIC THERAPY/EXERCISES: CPT | Mod: CQ,GP

## 2024-08-13 ASSESSMENT — PAIN SCALES - GENERAL: PAINLEVEL_OUTOF10: 7

## 2024-08-13 ASSESSMENT — PAIN - FUNCTIONAL ASSESSMENT: PAIN_FUNCTIONAL_ASSESSMENT: 0-10

## 2024-08-13 ASSESSMENT — PAIN DESCRIPTION - DESCRIPTORS: DESCRIPTORS: ACHING;DULL

## 2024-08-13 NOTE — PROGRESS NOTES
"Physical Therapy Treatment    Patient Name: Alanna Del Angel  MRN: [id number]  Today's Date: 8/13/2024  Time Calculation  Start Time: 1428  Stop Time: 1500  Time Calculation (min): 32 min    Insurance  2024 EL[employer] AUTH AFTER 30 VS VERNELL YR    Visit #8 (30 total per year)     Current Problem  1. Weakness        2. Postural orthostatic tachycardia syndrome (POTS)  Follow Up In Physical Therapy      3. Other reduced mobility  Follow Up In Physical Therapy      4. Decreased functional mobility and endurance            Subjective    General   Pt reports she has been doing aquatic therapy at another facility, which has been helping.  She is also doing HEP, which helps.  She is transferring over to this facility today secondary to therapist at [facility] having to go out on leave unexpectedly.  Reports lower, mid back is sore today.  Also reports hips are bothering her a little, R>L.     Precautions   No recent falls reported.    Pain   Pain Assessment: 0-10  Pain Score: 7/10  Pain Location: Back  Pain Orientation: Lower, Mid  Pain Descriptors: Aching, Dull  Pain Frequency: Constant/Continuous  Clinical Progression: Gradually Improving    Objective   Pt denies having any numbness, tingling    Treatments:  Aquatic Therapy (64927): 32 Minutes, 2 Units    Activities:  Aquatic Gait Activity: Fwd,Bkwd,Lat (watch posture/core) 2 laps ea  Noodle Hang: 3 minutes  Noodle Bicycle: 2 minutes  Hip PRE's: x10 each ARUNA (3way)  DLS w/UE mvmt: Paddles, Lv1, 3 way x10 each  Staggered Stance Row: Paddles Lv1 2x15 (switch stance after 15)  Noodle Rotation: 5\" x5 each  Noodle Press Down: x10  Noodle Pull Down: x10  Squats: x10     Assessment:   Reviewed activities previously being performed over at [facility].  Pt exhibits good recall of activities & completes with good tolerance.  She was able to complete additional activities here in pool today, as noted.  Overall, she did well with aquatic session.  Exhibits good ability to " continue with POC & progress with activities as tolerated to reduce sx's, increase strength, stability & improve capacity for daily activities.    Plan:   Continue to progress with aquatic POC as tolerated to increase LE/core strength, stability, reduce sx's & increase capacity for daily activities.

## 2024-08-20 ENCOUNTER — APPOINTMENT (OUTPATIENT)
Dept: PHYSICAL THERAPY | Facility: CLINIC | Age: 39
End: 2024-08-20
Payer: MEDICAID

## 2024-08-27 ENCOUNTER — APPOINTMENT (OUTPATIENT)
Dept: PHYSICAL THERAPY | Facility: CLINIC | Age: 39
End: 2024-08-27
Payer: MEDICAID

## 2024-08-27 ENCOUNTER — TREATMENT (OUTPATIENT)
Dept: PHYSICAL THERAPY | Facility: CLINIC | Age: 39
End: 2024-08-27
Payer: MEDICAID

## 2024-08-27 DIAGNOSIS — Z74.09 OTHER REDUCED MOBILITY: ICD-10-CM

## 2024-08-27 DIAGNOSIS — R53.1 WEAKNESS: Primary | ICD-10-CM

## 2024-08-27 DIAGNOSIS — G90.A POSTURAL ORTHOSTATIC TACHYCARDIA SYNDROME (POTS): ICD-10-CM

## 2024-08-27 DIAGNOSIS — Z74.09 DECREASED FUNCTIONAL MOBILITY AND ENDURANCE: ICD-10-CM

## 2024-08-27 PROCEDURE — 97113 AQUATIC THERAPY/EXERCISES: CPT | Mod: CQ,GP

## 2024-08-27 ASSESSMENT — PAIN - FUNCTIONAL ASSESSMENT: PAIN_FUNCTIONAL_ASSESSMENT: 0-10

## 2024-08-27 ASSESSMENT — PAIN DESCRIPTION - DESCRIPTORS: DESCRIPTORS: ACHING;DULL

## 2024-08-27 ASSESSMENT — PAIN SCALES - GENERAL: PAINLEVEL_OUTOF10: 7

## 2024-08-27 NOTE — PROGRESS NOTES
"Physical Therapy Treatment    Patient Name: Alanna Del Angel  MRN: [id number]  Today's Date: 8/27/2024  Time Calculation  Start Time: 1430  Stop Time: 1501  Time Calculation (min): 31 min    Insurance  2024 ELVER [employer] AUTH AFTER 30 VS VERNELL YR    Visit #9 (30 total per year)      Current Problem  1. Weakness        2. Postural orthostatic tachycardia syndrome (POTS)  Follow Up In Physical Therapy      3. Other reduced mobility  Follow Up In Physical Therapy      4. Decreased functional mobility and endurance            Subjective    General   Pt reports she was \"pretty tired\" following last visit.  States she began feeling better after a couple days.  Pain has been about the same, \"no more than usual\".  Sore today.  No new complaints.     Precautions  Precautions  Precautions Comment: No recent falls reported    Pain  Pain Assessment  Pain Assessment: 0-10  0-10 (Numeric) Pain Score: 7  Pain Location: Back  Pain Orientation: Lower, Mid  Pain Descriptors: Aching, Dull  Pain Frequency: Constant/continuous  Clinical Progression: Gradually improving    Objective   No current c/o numbness, tingling.  Occasional sx's in B feet.    Treatments:  Aquatic Therapy (61757): 31 Minutes, 2 Units    Activities:  Aquatic Gait Activity: Fwd,Bkwd,Lat (watch posture/core) 2 laps ea  Noodle Hang: 3 minutes  Noodle Bicycle: 2 minutes  Hip PRE's: x10 each ARUNA (3way)  DLS w/UE mvmt: Paddles, Lv1, 3 way x10 each  Staggered Stance Row: Paddles Lv1 2x15 (switch stance after 15)  Noodle Rotation: 5\" x5 each  Noodle Press Down: x10  Noodle Pull Down: x10  Squats: x10     Assessment:   Reviewed all activities initiated previous visit, with pt exhibiting good recall.  Good follow through to occasional vc'ing for improved technique, posture during activities.  No new activities were added this visit secondary to pt reporting fatigue for several days following previous visit.  However, she did well today with activities given & completes with good " tolerance.  Reports no pain post activity.  Anticipate she will be able to continue progressing with POC as tolerated.    Plan:    Assess response to aquatic activities this visit.  Continue to progress with aquatic POC as tolerated to increase LE/core strength, stability, reduce sx's & increase capacity for daily activities.

## 2024-08-29 ENCOUNTER — APPOINTMENT (OUTPATIENT)
Dept: BEHAVIORAL HEALTH | Facility: CLINIC | Age: 39
End: 2024-08-29
Payer: MEDICAID

## 2024-08-29 DIAGNOSIS — F43.10 PTSD (POST-TRAUMATIC STRESS DISORDER): ICD-10-CM

## 2024-08-29 DIAGNOSIS — F41.1 GAD (GENERALIZED ANXIETY DISORDER): ICD-10-CM

## 2024-08-29 PROCEDURE — 99214 OFFICE O/P EST MOD 30 MIN: CPT | Performed by: PSYCHIATRY & NEUROLOGY

## 2024-08-29 RX ORDER — DESVENLAFAXINE 50 MG/1
50 TABLET, EXTENDED RELEASE ORAL DAILY
Qty: 30 TABLET | Refills: 2 | Status: SHIPPED | OUTPATIENT
Start: 2024-08-29 | End: 2024-11-27

## 2024-08-29 ASSESSMENT — PATIENT HEALTH QUESTIONNAIRE - PHQ9
10. IF YOU CHECKED OFF ANY PROBLEMS, HOW DIFFICULT HAVE THESE PROBLEMS MADE IT FOR YOU TO DO YOUR WORK, TAKE CARE OF THINGS AT HOME, OR GET ALONG WITH OTHER PEOPLE: SOMEWHAT DIFFICULT
4. FEELING TIRED OR HAVING LITTLE ENERGY: MORE THAN HALF THE DAYS
SUM OF ALL RESPONSES TO PHQ9 QUESTIONS 1 AND 2: 3
1. LITTLE INTEREST OR PLEASURE IN DOING THINGS: MORE THAN HALF THE DAYS
SUM OF ALL RESPONSES TO PHQ QUESTIONS 1-9: 8
7. TROUBLE CONCENTRATING ON THINGS, SUCH AS READING THE NEWSPAPER OR WATCHING TELEVISION: SEVERAL DAYS
9. THOUGHTS THAT YOU WOULD BE BETTER OFF DEAD, OR OF HURTING YOURSELF: NOT AT ALL
8. MOVING OR SPEAKING SO SLOWLY THAT OTHER PEOPLE COULD HAVE NOTICED. OR THE OPPOSITE, BEING SO FIGETY OR RESTLESS THAT YOU HAVE BEEN MOVING AROUND A LOT MORE THAN USUAL: NOT AT ALL
2. FEELING DOWN, DEPRESSED OR HOPELESS: SEVERAL DAYS
6. FEELING BAD ABOUT YOURSELF - OR THAT YOU ARE A FAILURE OR HAVE LET YOURSELF OR YOUR FAMILY DOWN: SEVERAL DAYS
3. TROUBLE FALLING OR STAYING ASLEEP OR SLEEPING TOO MUCH: SEVERAL DAYS
5. POOR APPETITE OR OVEREATING: NOT AT ALL

## 2024-08-29 ASSESSMENT — ENCOUNTER SYMPTOMS
NERVOUS/ANXIOUS: 1
DYSPHORIC MOOD: 0

## 2024-08-31 DIAGNOSIS — G90.A POTS (POSTURAL ORTHOSTATIC TACHYCARDIA SYNDROME): ICD-10-CM

## 2024-09-03 RX ORDER — PROPRANOLOL HYDROCHLORIDE 10 MG/1
TABLET ORAL
Qty: 60 TABLET | Refills: 0 | OUTPATIENT
Start: 2024-09-03

## 2024-09-06 DIAGNOSIS — G90.A POTS (POSTURAL ORTHOSTATIC TACHYCARDIA SYNDROME): ICD-10-CM

## 2024-09-06 NOTE — TELEPHONE ENCOUNTER
Patient being assessed today for initial evaluation of POTS. Patient reports a diagnosis of POTS was given 2 years ago after autonomic testing was completed. Patient also reports having a diagnosis of COVID 2 years ago and symptoms significantly became worse with that. Reports even at rest heart rate can elevate up to 160 bpm and that will occur 6-8 times a day. Denies loss of consciousness. Has already been increasing fluids, increasing sodium intake, using compression clothing. Does report struggling with activity as that raises the heart rate even further. Would like to get some orthostatics completed, patient to keep a diary. Reviewed the process of getting that data obtained. With the heart rate becoming still elevated, we will try a low-dose beta-blocker titrating up to twice daily and will monitor response. Discussed role of medicine, importance of taking medications, potential risks, benefits, and precautions to be taken. Reviewed sleep hygiene and dietary modifications. Follow-up in 4 to 6 weeks.

## 2024-09-09 ENCOUNTER — OFFICE VISIT (OUTPATIENT)
Dept: GASTROENTEROLOGY | Facility: CLINIC | Age: 39
End: 2024-09-09
Payer: MEDICAID

## 2024-09-09 VITALS — HEART RATE: 96 BPM | DIASTOLIC BLOOD PRESSURE: 72 MMHG | TEMPERATURE: 97.5 F | SYSTOLIC BLOOD PRESSURE: 129 MMHG

## 2024-09-09 DIAGNOSIS — R19.7 DIARRHEA, UNSPECIFIED TYPE: ICD-10-CM

## 2024-09-09 DIAGNOSIS — R10.84 GENERALIZED ABDOMINAL PAIN: ICD-10-CM

## 2024-09-09 PROCEDURE — 99213 OFFICE O/P EST LOW 20 MIN: CPT | Performed by: NURSE PRACTITIONER

## 2024-09-09 RX ORDER — WHEAT DEXTRIN 5 G/7.4 G
POWDER (GRAM) ORAL
Qty: 248 G | Refills: 1 | Status: SHIPPED | OUTPATIENT
Start: 2024-09-09

## 2024-09-09 RX ORDER — PROPRANOLOL HYDROCHLORIDE 10 MG/1
10 TABLET ORAL 2 TIMES DAILY
Qty: 34 TABLET | Refills: 0 | Status: SHIPPED | OUTPATIENT
Start: 2024-09-09

## 2024-09-09 ASSESSMENT — ENCOUNTER SYMPTOMS
FEVER: 0
MUSCULOSKELETAL NEGATIVE: 1
COUGH: 0
APNEA: 0
ROS GI COMMENTS: SEE HPI
STRIDOR: 0
NEUROLOGICAL NEGATIVE: 1
EYES NEGATIVE: 1
WHEEZING: 0
RESPIRATORY NEGATIVE: 1
CARDIOVASCULAR NEGATIVE: 1
DEPRESSION: 0
CHILLS: 0
ENDOCRINE NEGATIVE: 1
SHORTNESS OF BREATH: 0
DIAPHORESIS: 0
PSYCHIATRIC NEGATIVE: 1
HEMATOLOGIC/LYMPHATIC NEGATIVE: 1
FATIGUE: 0
CHEST TIGHTNESS: 0
DIFFICULTY URINATING: 0
ALLERGIC/IMMUNOLOGIC NEGATIVE: 1

## 2024-09-09 ASSESSMENT — PAIN SCALES - GENERAL: PAINLEVEL: 6

## 2024-09-09 NOTE — PATIENT INSTRUCTIONS
Start benefiber one teaspoon daily with a full glass of water  Try this for 2 weeks    Complete stool tests    Try a lactose free diet x 2 weeks   Try a gluten free diet x 2 weeks      Follow up in 1-2 months

## 2024-09-09 NOTE — PROGRESS NOTES
Subjective   Patient ID: Alanna Del Angel is a 39 y.o. adult who presents for Nausea, Abdominal Pain, Constipation, and Diarrhea. Pronouns: they/them   Abdominal pain: Generalized lower abdominal cramping, when they have a food that does not agree, goes through quickly, will have cramping, diarrhea and followed by constipation.    Usually has a BM every other day  They will have diarrhea a few times/week   Foods like wheat, tomatoes, strawberries--this is newer  Since they had Covid       They did an elimination diet  Too much vinegar they will get diarrhea    Family Hx: Mom has diverticulitis       Review of Systems   Constitutional:  Negative for chills, diaphoresis, fatigue and fever.   HENT: Negative.     Eyes: Negative.    Respiratory: Negative.  Negative for apnea, cough, chest tightness, shortness of breath, wheezing and stridor.    Cardiovascular: Negative.    Gastrointestinal:         See HPI    Endocrine: Negative.    Genitourinary: Negative.  Negative for difficulty urinating.   Musculoskeletal: Negative.    Skin: Negative.    Allergic/Immunologic: Negative.    Neurological: Negative.    Hematological: Negative.    Psychiatric/Behavioral: Negative.         Objective   Physical Exam  Constitutional:       Appearance: Normal appearance. Alanna is normal weight.   HENT:      Nose: Nose normal.   Eyes:      General: Lids are normal.   Cardiovascular:      Rate and Rhythm: Normal rate and regular rhythm.      Heart sounds: Normal heart sounds.   Pulmonary:      Effort: Pulmonary effort is normal.      Breath sounds: Normal breath sounds.   Abdominal:      General: Bowel sounds are normal.   Musculoskeletal:         General: Normal range of motion.   Skin:     General: Skin is warm and dry.   Neurological:      Mental Status: Alanna is alert and oriented to person, place, and time.   Psychiatric:         Mood and Affect: Mood normal.       Assessment/Plan   Diagnoses and all orders for this  visit:  Diarrhea, unspecified type  -     wheat dextrin (Benefiber Healthy Shape) 5 gram/7.4 gram powder; Take 1 teaspoon daily  -     Calprotectin, Fecal; Future  -     C-Reactive Protein; Future  -     Tissue Transglutaminase IgA; Future  Generalized abdominal pain  -     wheat dextrin (Benefiber Healthy Shape) 5 gram/7.4 gram powder; Take 1 teaspoon daily  -     Calprotectin, Fecal; Future  -     C-Reactive Protein; Future  -     Tissue Transglutaminase IgA; Future     39 year old NB with a PMH of POTs presents today for alternating diarrhea, constipation, cramping. They have had alternating diarrhea and constipation since having COVID approximately 4 years ago. They will complete stool tests for completeness, start benefiber. They will try a lactose, gluten free diet. Follow-up in 1-2 months.     ERICA Toro 09/09/24 1:45 PM

## 2024-09-12 ENCOUNTER — APPOINTMENT (OUTPATIENT)
Dept: PHYSICAL THERAPY | Facility: CLINIC | Age: 39
End: 2024-09-12
Payer: MEDICAID

## 2024-09-25 ENCOUNTER — TELEMEDICINE (OUTPATIENT)
Dept: NEUROLOGY | Facility: CLINIC | Age: 39
End: 2024-09-25
Payer: MEDICAID

## 2024-09-25 DIAGNOSIS — G90.A POTS (POSTURAL ORTHOSTATIC TACHYCARDIA SYNDROME): ICD-10-CM

## 2024-09-25 PROCEDURE — 99214 OFFICE O/P EST MOD 30 MIN: CPT | Performed by: NURSE PRACTITIONER

## 2024-09-25 RX ORDER — PROPRANOLOL HYDROCHLORIDE 10 MG/1
10 TABLET ORAL 2 TIMES DAILY
Qty: 60 TABLET | Refills: 5 | Status: SHIPPED | OUTPATIENT
Start: 2024-09-25

## 2024-09-25 NOTE — PROGRESS NOTES
Patient being assessed today for follow-up of POTS.  She reports that as long as she takes the propranolol she is able to participate in activities throughout the day as simple as cooking.  Symptoms were more manageable.  She has been out of her medication as we needed a follow-up appointment.  We will resume the propranolol as she was taking it as she was tolerating it well previously.  Discussed role of medicine,  importance of taking medications, potential risks, benefits, and precautions to be taken.  Reviewed sleep hygiene and dietary modifications.  Follow-up in 6 months.    This note was created with voice recognition software and was not corrected for typographical or grammatical errors

## 2024-10-01 SDOH — ECONOMIC STABILITY: FOOD INSECURITY: WITHIN THE PAST 12 MONTHS, THE FOOD YOU BOUGHT JUST DIDN'T LAST AND YOU DIDN'T HAVE MONEY TO GET MORE.: OFTEN TRUE

## 2024-10-01 SDOH — ECONOMIC STABILITY: HOUSING INSECURITY: IN THE PAST 12 MONTHS, HOW MANY TIMES HAVE YOU MOVED WHERE YOU WERE LIVING?: 0

## 2024-10-01 SDOH — ECONOMIC STABILITY: TRANSPORTATION INSECURITY
IN THE PAST 12 MONTHS, HAS LACK OF TRANSPORTATION KEPT YOU FROM MEETINGS, WORK, OR FROM GETTING THINGS NEEDED FOR DAILY LIVING?: YES

## 2024-10-01 SDOH — ECONOMIC STABILITY: INCOME INSECURITY: IN THE LAST 12 MONTHS, WAS THERE A TIME WHEN YOU WERE NOT ABLE TO PAY THE MORTGAGE OR RENT ON TIME?: PATIENT DECLINED

## 2024-10-01 SDOH — ECONOMIC STABILITY: INCOME INSECURITY: HOW HARD IS IT FOR YOU TO PAY FOR THE VERY BASICS LIKE FOOD, HOUSING, MEDICAL CARE, AND HEATING?: VERY HARD

## 2024-10-01 SDOH — HEALTH STABILITY: PHYSICAL HEALTH
HOW OFTEN DO YOU NEED TO HAVE SOMEONE HELP YOU WHEN YOU READ INSTRUCTIONS, PAMPHLETS, OR OTHER WRITTEN MATERIAL FROM YOUR DOCTOR OR PHARMACY?: SOMETIMES

## 2024-10-01 SDOH — HEALTH STABILITY: PHYSICAL HEALTH: ON AVERAGE, HOW MANY MINUTES DO YOU ENGAGE IN EXERCISE AT THIS LEVEL?: 0 MIN

## 2024-10-01 SDOH — HEALTH STABILITY: PHYSICAL HEALTH: ON AVERAGE, HOW MANY DAYS PER WEEK DO YOU ENGAGE IN MODERATE TO STRENUOUS EXERCISE (LIKE A BRISK WALK)?: 0 DAYS

## 2024-10-01 SDOH — ECONOMIC STABILITY: TRANSPORTATION INSECURITY
IN THE PAST 12 MONTHS, HAS THE LACK OF TRANSPORTATION KEPT YOU FROM MEDICAL APPOINTMENTS OR FROM GETTING MEDICATIONS?: YES

## 2024-10-01 SDOH — ECONOMIC STABILITY: HOUSING INSECURITY: AT ANY TIME IN THE PAST 12 MONTHS, WERE YOU HOMELESS OR LIVING IN A SHELTER (INCLUDING NOW)?: NO

## 2024-10-01 SDOH — ECONOMIC STABILITY: FOOD INSECURITY: WITHIN THE PAST 12 MONTHS, YOU WORRIED THAT YOUR FOOD WOULD RUN OUT BEFORE YOU GOT MONEY TO BUY MORE.: OFTEN TRUE

## 2024-10-01 SDOH — HEALTH STABILITY: MENTAL HEALTH
STRESS IS WHEN SOMEONE FEELS TENSE, NERVOUS, ANXIOUS, OR CAN'T SLEEP AT NIGHT BECAUSE THEIR MIND IS TROUBLED. HOW STRESSED ARE YOU?: TO SOME EXTENT

## 2024-10-01 ASSESSMENT — ANXIETY QUESTIONNAIRES
1. FEELING NERVOUS, ANXIOUS, OR ON EDGE: MORE THAN HALF THE DAYS
1. FEELING NERVOUS, ANXIOUS, OR ON EDGE: MORE THAN HALF THE DAYS
6. BECOMING EASILY ANNOYED OR IRRITABLE: NOT AT ALL
IF YOU CHECKED OFF ANY PROBLEMS ON THIS QUESTIONNAIRE, HOW DIFFICULT HAVE THESE PROBLEMS MADE IT FOR YOU TO DO YOUR WORK, TAKE CARE OF THINGS AT HOME, OR GET ALONG WITH OTHER PEOPLE: SOMEWHAT DIFFICULT
7. FEELING AFRAID AS IF SOMETHING AWFUL MIGHT HAPPEN: SEVERAL DAYS
3. WORRYING TOO MUCH ABOUT DIFFERENT THINGS: MORE THAN HALF THE DAYS
5. BEING SO RESTLESS THAT IT IS HARD TO SIT STILL: NOT AT ALL
7. FEELING AFRAID AS IF SOMETHING AWFUL MIGHT HAPPEN: SEVERAL DAYS
6. BECOMING EASILY ANNOYED OR IRRITABLE: NOT AT ALL
3. WORRYING TOO MUCH ABOUT DIFFERENT THINGS: MORE THAN HALF THE DAYS
2. NOT BEING ABLE TO STOP OR CONTROL WORRYING: SEVERAL DAYS
4. TROUBLE RELAXING: SEVERAL DAYS
5. BEING SO RESTLESS THAT IT IS HARD TO SIT STILL: NOT AT ALL
4. TROUBLE RELAXING: SEVERAL DAYS
IF YOU CHECKED OFF ANY PROBLEMS ON THIS QUESTIONNAIRE, HOW DIFFICULT HAVE THESE PROBLEMS MADE IT FOR YOU TO DO YOUR WORK, TAKE CARE OF THINGS AT HOME, OR GET ALONG WITH OTHER PEOPLE: SOMEWHAT DIFFICULT
2. NOT BEING ABLE TO STOP OR CONTROL WORRYING: SEVERAL DAYS
GAD7 TOTAL SCORE: 7

## 2024-10-01 ASSESSMENT — LIFESTYLE VARIABLES
AUDIT-C TOTAL SCORE: 0
HOW OFTEN DO YOU HAVE A DRINK CONTAINING ALCOHOL: NEVER
HOW OFTEN DO YOU HAVE SIX OR MORE DRINKS ON ONE OCCASION: NEVER
USE_ALCOHOL_TO_HELP_SLEEP: YES
HOW OFTEN DO YOU HAVE A DRINK CONTAINING ALCOHOL: NEVER
DO_YOU_DRINK?: I DID NOT DO THIS BEFORE COVID-19
USE_PILLS_ALCOHOL_TO_HELP_SLEEP: 1 A WEEK
HOW MANY STANDARD DRINKS CONTAINING ALCOHOL DO YOU HAVE ON A TYPICAL DAY: PATIENT DOES NOT DRINK
HOW OFTEN DO YOU HAVE 6 OR MORE DRINKS ON ONE OCCASION: NEVER
SKIP TO QUESTIONS 9-10: 1
HOW MANY STANDARD DRINKS CONTAINING ALCOHOL DO YOU HAVE ON A TYPICAL DAY: PATIENT DOES NOT DRINK

## 2024-10-01 ASSESSMENT — PATIENT HEALTH QUESTIONNAIRE - PHQ9
7. TROUBLE CONCENTRATING ON THINGS, SUCH AS READING THE NEWSPAPER OR WATCHING TELEVISION: MORE THAN HALF THE DAYS
4. FEELING TIRED OR HAVING LITTLE ENERGY: MORE THAN HALF THE DAYS
5. POOR APPETITE OR OVEREATING: MORE THAN HALF THE DAYS
3. TROUBLE FALLING OR STAYING ASLEEP: SEVERAL DAYS
7. TROUBLE CONCENTRATING ON THINGS, SUCH AS READING THE NEWSPAPER OR WATCHING TELEVISION: MORE THAN HALF THE DAYS
1. LITTLE INTEREST OR PLEASURE IN DOING THINGS: MORE THAN HALF THE DAYS
3. TROUBLE FALLING OR STAYING ASLEEP OR SLEEPING TOO MUCH: SEVERAL DAYS
10. IF YOU CHECKED OFF ANY PROBLEMS, HOW DIFFICULT HAVE THESE PROBLEMS MADE IT FOR YOU TO DO YOUR WORK, TAKE CARE OF THINGS AT HOME, OR GET ALONG WITH OTHER PEOPLE: VERY DIFFICULT
9. THOUGHTS THAT YOU WOULD BE BETTER OFF DEAD, OR OF HURTING YOURSELF: NOT AT ALL
9. THOUGHTS THAT YOU WOULD BE BETTER OFF DEAD, OR OF HURTING YOURSELF: NOT AT ALL
SUM OF ALL RESPONSES TO PHQ QUESTIONS 1-9: 10
1. LITTLE INTEREST OR PLEASURE IN DOING THINGS: MORE THAN HALF THE DAYS
8. MOVING OR SPEAKING SO SLOWLY THAT OTHER PEOPLE COULD HAVE NOTICED. OR THE OPPOSITE, BEING SO FIGETY OR RESTLESS THAT YOU HAVE BEEN MOVING AROUND A LOT MORE THAN USUAL: NOT AT ALL
6. FEELING BAD ABOUT YOURSELF - OR THAT YOU ARE A FAILURE OR HAVE LET YOURSELF OR YOUR FAMILY DOWN: NOT AT ALL
6. FEELING BAD ABOUT YOURSELF - OR THAT YOU ARE A FAILURE OR HAVE LET YOURSELF OR YOUR FAMILY DOWN: NOT AT ALL
2. FEELING DOWN, DEPRESSED OR HOPELESS: SEVERAL DAYS
2. FEELING DOWN, DEPRESSED OR HOPELESS: SEVERAL DAYS
SUM OF ALL RESPONSES TO PHQ9 QUESTIONS 1 & 2: 3
4. FEELING TIRED OR HAVING LITTLE ENERGY: MORE THAN HALF THE DAYS
10. IF YOU CHECKED OFF ANY PROBLEMS, HOW DIFFICULT HAVE THESE PROBLEMS MADE IT FOR YOU TO DO YOUR WORK, TAKE CARE OF THINGS AT HOME, OR GET ALONG WITH OTHER PEOPLE: VERY DIFFICULT
8. MOVING OR SPEAKING SO SLOWLY THAT OTHER PEOPLE COULD HAVE NOTICED. OR THE OPPOSITE - BEING SO FIDGETY OR RESTLESS THAT YOU HAVE BEEN MOVING AROUND A LOT MORE THAN USUAL: NOT AT ALL
5. POOR APPETITE OR OVEREATING: MORE THAN HALF THE DAYS

## 2024-10-01 ASSESSMENT — SLEEP AND FATIGUE QUESTIONNAIRES
EXERCISE_BRINGS_ON_FATIGUE: 4
EASILY_FATIGUED: 7 STRONGLY AGREE
EASILY_FATIGUED: 7 STRONGLY AGREE
FATIGUE_MOST_DISABILING_SYMPTOM: 7 STRONGLY AGREE
FATIGUE_INTERFERES_PHYSICAL_FUNCTIONING: 5
FATIGUE_MOST_DISABILING_SYMPTOM: 7 STRONGLY AGREE
FATIGUE_INTERFERES_SOCIAL_LIFE: 7 STRONGLY AGREE
MY FATIGUE PREVENTS SUSTAINED PHYSICAL FUNCTIONING.: 6
MY MOTIVATION IS LOWER WHEN I AM FATIGUED.: 7 STRONGLY AGREE
AVERAGE_FSS_SCORE: 6.11
EXERCISE_BRINGS_ON_FATIGUE: 4
FATIGUE_INTERFERES_RESPONSIBILITIES: 6
FATIGUE_INTERFERES_RESPONSIBILITIES: 6
FATIGUE_CAUSES_FREQUENT_PROBLEMTS: 6
MY FATIGUE PREVENTS SUSTAINED PHYSICAL FUNCTIONING.: 6
FATIGUE_INTERFERES_PHYSICAL_FUNCTIONING: 5
FATIGUE_INTERFERES_SOCIAL_LIFE: 7 STRONGLY AGREE
FATIGUE_CAUSES_FREQUENT_PROBLEMTS: 6
VISUAL ANALOGUE FATIGUE SCALE (VAFS): 3
MY MOTIVATION IS LOWER WHEN I AM FATIGUED.: 7 STRONGLY AGREE

## 2024-10-01 ASSESSMENT — SOCIAL DETERMINANTS OF HEALTH (SDOH)
IN A TYPICAL WEEK, HOW MANY TIMES DO YOU TALK ON THE PHONE WITH FAMILY, FRIENDS, OR NEIGHBORS?: NEVER
DO YOU BELONG TO ANY CLUBS OR ORGANIZATIONS SUCH AS CHURCH GROUPS UNIONS, FRATERNAL OR ATHLETIC GROUPS, OR SCHOOL GROUPS?: NO
HOW OFTEN DO YOU GET TOGETHER WITH FRIENDS OR RELATIVES?: THREE TIMES A WEEK
HOW OFTEN DO YOU ATTENT MEETINGS OF THE CLUB OR ORGANIZATION YOU BELONG TO?: NEVER
HOW OFTEN DO YOU ATTENT MEETINGS OF THE CLUB OR ORGANIZATION YOU BELONG TO?: NEVER
IN THE PAST 12 MONTHS, HAS THE ELECTRIC, GAS, OIL, OR WATER COMPANY THREATENED TO SHUT OFF SERVICE IN YOUR HOME?: NO
HOW OFTEN DO YOU ATTEND CHURCH OR RELIGIOUS SERVICES?: NEVER
HOW OFTEN DO YOU GET TOGETHER WITH FRIENDS OR RELATIVES?: THREE TIMES A WEEK
IN A TYPICAL WEEK, HOW MANY TIMES DO YOU TALK ON THE PHONE WITH FAMILY, FRIENDS, OR NEIGHBORS?: NEVER
IN THE PAST 12 MONTHS, HAS THE ELECTRIC, GAS, OIL, OR WATER COMPANY THREATENED TO SHUT OFF SERVICE IN YOUR HOME?: NO
DO YOU BELONG TO ANY CLUBS OR ORGANIZATIONS SUCH AS CHURCH GROUPS UNIONS, FRATERNAL OR ATHLETIC GROUPS, OR SCHOOL GROUPS?: NO
HOW OFTEN DO YOU ATTEND CHURCH OR RELIGIOUS SERVICES?: NEVER

## 2024-10-07 ENCOUNTER — APPOINTMENT (OUTPATIENT)
Dept: NEUROLOGY | Facility: CLINIC | Age: 39
End: 2024-10-07
Payer: MEDICAID

## 2024-10-10 DIAGNOSIS — G90.A POSTURAL ORTHOSTATIC TACHYCARDIA SYNDROME (POTS): Primary | ICD-10-CM

## 2024-10-15 ENCOUNTER — TELEMEDICINE CLINICAL SUPPORT (OUTPATIENT)
Dept: OTHER | Facility: CLINIC | Age: 39
End: 2024-10-15
Payer: MEDICAID

## 2024-10-15 DIAGNOSIS — Z86.16 PERSONAL HISTORY OF COVID-19: ICD-10-CM

## 2024-10-15 RX ORDER — FOLIC ACID 0.4 MG
TABLET ORAL DAILY
COMMUNITY

## 2024-10-15 ASSESSMENT — MONTREAL COGNITIVE ASSESSMENT (MOCA)
13. ORIENTATION SUBSCORE: 6
9. REPEAT EACH SENTENCE: 1
8. SERIAL SUBTRACTION OF 7S: 3
7. [VIGILENCE] TAP WHEN HEARING DESIGNATED LETTER: 1
5. MEMORY TRIALS: 0
WHAT LEVEL OF EDUCATION WAS ATTAINED: 0
11. FOR EACH PAIR OF WORDS, WHAT CATEGORY DO THEY BELONG TO (OUT OF 2): 2
4. NAME EACH OF THE THREE ANIMALS SHOWN: 0
VISUOSPATIAL/EXECUTIVE SUBSCORE: 0
6. READ LIST OF DIGITS [FORWARD/BACKWARD]: 2
12. MEMORY INDEX SCORE: 2
10. [FLUENCY] NAME WORDS STARTING WITH DESIGNATED LETTER: 0
WHAT IS THE TOTAL SCORE (OUT OF 30): 17

## 2024-10-16 NOTE — PROGRESS NOTES
NPV Virtual The virtual visit conducted with Audio and Video.    Verbal consent was given for the following virtual visit, patient is currently located in [place]. All issues discussed and addressed below were done so without a physical examination. If it was felt the patient needed be seen in clinic in person they were directed there.    Subjective   COVID-19 Infection Date:  12/2021  PCR confirmed (sx: (Patient-Rptd) Fever, Fatigue, Headache, Sore throat, Cough, Loss or disturbed taste, Muscle pain, Diarrhea, Sinus pressure, Shortness of breath, Runny nose, Abdominal pain, Brain fog  - no hospitalization, no treatment)    COVID-19 vaccine status: (Patient-Rptd) Pfizer, 4/16/21, 5/7/21     Occupation: unable to work due to chronic illness prior to COVID     Current Providers: PCP none, Neurology CNP Lan, GI CNP Skyler, Psychiatyr Dr. Pittman, Allergy/Immunology Dr. Valenzuela, Cardiology Dr. Shore    Survey Scores: 10/2024  PHQ-9: 10   MICHEL-7: 7   Sleep Wellness: 4   FSS average: 6.11   Modified Ecog average: 2.5   MOCA: 17/22 (10/2024)  Overall Health: 30     39 y.o. nonbinary with a h/o COVID-19 as noted above, depression, MICHEL, PTSD, fibromyalgia, IBS, POTS, MTHFR gene mutation, hEDS, [facility] with c/o Dizziness, palpitations, cough, post-nasal drainage, GI complaints, concerns for MCAS, increased musculoskeletal pain, poor sleep, fatigue, brain fog, numbness and tingling    Never really recovered, especially in regards to gut problems  After COVID could not eat vinegar or lunch meat, had diarrhea with everything, did an elimination diet (rice and chicken) and then added things slowly, got to the point of being ania to eat vinegar again, no lunch meat  Became allergic to tomatoes, oats, strawberries and other foods, baseline wheat intolerance  POTS became a lot worse , started to keep track of her HR and noted that it went up to 140 when sleeping or laying  down  Impossible to exercise except for pool therapy, needs to go back to that  HR problems have been causing problems  Restarted propranolol in April, that has helped a little bit, still having 2-3 times per day when HR goes high for no reason  When HR goes up feels like anxiety, getting dizzy  Usually feels better when laying down  No chest pain, sometimes getting pain on the left side of chest but has a lot of joint pain and such from EDS  No SOB typically, coughing often, cough is often productive, sputum color is usually clear  Has post-nasal drainage, uses bromoline sodium nasal solution that helps a little bit, worse when allergies are present  Sinus congestion when allergies are bad  No lingering smell or taste changes  When they reduced medications had better control over digestive problems  Follows low histamine diet as much as they can  Started taking Quercetin with bromoline supplement and that helps a lot in regards to eating things again  Even with these has stomach problems, when eating foods they are sensitive to gets bad diarrhea, feels like passing out after BM, has to sleep for a few hours  Then having diarrhea for 2-3 days after exposure to food that cause sensitivity  Lots of stomach cramping  Persistent nausea has been an ongoing problem since 2016, medical marijuana helps with nausea, sometimes Zofran  No vomiting, no acid reflux, has hiatal hernia but the last two years reflux has not been bad, relates this to change in food intake  Adjusted her coffee, grinds own beans, 2-3 cups per day  Drinking water, soda occasionally especially when POTS is bad  Probably drinking 3-4 16oz containers of non-caffeinate fluids per day  Paying attention to salt intake, adding extra salt to everything, not really measuring  Usually wearing compression stockings, on bad days longer ones and most of the days up to the knees, those help  Sleeps with head of the bed elevated  Propranolol dose is 10mg twice daily,  feels as though it is not quite enough  For mast cell takes pepcid 20mg once per day and loratadine 10mg once per day, those make a difference, loratadine helps more than the other medication, has not tried famotidine or loratadine twice per day  Does not usually have diarrhea if following low histamine diet, unless exposed to mold then is immediately sick  Some types of tree pollen increase histamine levels as well  Keeping things clean, has air filter  Face gets very red, butterfly rash, getting contact rashes from metals, cannot use antiperspirant but deodorant is OK  Generally bruises easily, skin is fragile and gets cuts easily  No swelling or edema, relates to to compression stockings  Msk pain from EDS became worse after COVID, got to the point of needing high-dose Ketamine infusions for pain, got these through [facility] in [place], insurance is not paying for it, now using medical marijuana  Last Ketamine treatment was roughly two years ago, it worked great, pain was minimal after treatments  Pain is over all joints, hips and lower back are bad, shoulders are bad, dislocates shoulders in sleep  Plans to reschedule with PT  Mood-wise is coping pretty good, divorce was finalized earlier this year,  for a year prior, that has been good  Has gotten back into dating, has a decent social life, especially online, doing community mental health support through [employer], that has been a good outlet for her  Sleeping terribly, 4 hours at most, wakes up frequently, usually tries taking a nap later, sleep times are inconsistent  Both difficulty falling and staying asleep, sometimes passes out though  Fatigue is present all the time, does not seem to lift up at all, POTS makes fatigue worse  Snores, talks in sleep and sleeps with eyes open  Had a sleep study in 2015 and they found that there are restless legs, recommended sleep medicine but prior trials of these caused sleep  walking  Gets overwhelmingly tired a lot, but since not driving or going out often can usually sleep, struggles to work on video [employer] on stream, often cannot stay sitting up for too long, tries to take a break every 1-2 hours  Has terrible memory with scheduling, remembering names and faces is difficult, short-term memory is OK but does not retain information past a few minutes, this caused problems with work so has not been able to be employed  Cannot process and remember things at times, has issues with attention and focus  Used to work in health care, fatigue and brain fog keep them from working  Will be going to court for SSI, has been trying to get this for years, has not worked in her field since 2018  Getting paid from [employer] every once in a while  Notes ADHD runs in family, has never been tested because dad did not want to give a label  Started to get cluster headaches, very painful around right eye and right part of head  Has a history of migraines, once per month  Started noticing cluster headaches more in 2016, more frequently after COVID, getting them 3-4 times per week  No treatment, rides it out, tries to avoid triggers, rockstar vitamin drink seem to help with headaches  Right eye vision gets blurry with cluster headache, also more light sensitive then, otherwise no vision changes  Usually do not last long, but sometimes lasting more than a day  No hearing changes, sometimes tinnitus  Has some nerve damage in feet, getting numbness and tingling in them, a little in hands as well intermittently  No dry eyes, getting dry mouth often  Has yeast problem, needs to do better oral care    full ROS completed and all negative unless noted in HPI     Relevant prior healthcare visits:  -09/2024 Neurology for POTS, resuming propranolol as well tolerated previously  -09/2024 GI ordered labs and benefiber for diarrhea and abdominal pain/constipation, will try lactose/gluten free diet  -08/2024 Psychiatry  continues Pristiq for PTSD and MICHEL, continue therapy  -08/2024 PT aquatic for weakness, fibromyalgia, and POTS  -07/2024 Allergy/Immunology for adverse food reactions and concerns for possible MCAS, ordered blood and urine tests, environmental allergy test, IgE to wheat, tomato, strawberry, oat. Also ordered PFTs to evaluate for asthma, cetrizine one or twice daily, continue famotidine, referral to [facility]  -05/2024 PCP recommended nutrition supplements, follow up with allergy/immunology, restart Elavil and Pristiq  -01/2024 Cardiology recommends conservative measures for ? POTS, referred to autonomic neurology    Relevant prior diagnostic studies:  -12/2021 autonomic testing consistent with POTS    Relevant prior laboratory values, unremarkable unless noted:  -07/2024 IgE to oat, strawberry, tomato, wheat, tryptase  -03/2024 CRP 2.6, homocysteine elevated, ferritin 38, Vitamin B12 462, TSH, T3, Mag 1.53, Lipids, CMP, CBC/D with WBC 12, vitamin D 26  -02/2024 Syphilis, HIV, Hepatitis    Exercise routine: none  Diet:  Weight hx: pre-COVID-19 165 lbs -> post-COVID-19 140 lbs  Substance use: former tobacco use, 0 servings ETOH   Social:       Current Outpatient Medications:     acyclovir (Zovirax) 400 mg tablet, Take 1 tablet (400 mg) by mouth 2 times a day., Disp: 90 tablet, Rfl: 3    Bacillus coagulans (Digestive Advantage Probio-Pre) 800 million cell tablet, Take 1 tablet by mouth once daily., Disp: 90 tablet, Rfl: 3    blood pressure test kit-medium kit, 1 kit if needed (POTS)., Disp: 1 kit, Rfl: 0    cyanocobalamin (Vitamin B-12) 1,000 mcg tablet, Take 1 tablet (1,000 mcg) by mouth once daily., Disp: 30 tablet, Rfl: 1    desvenlafaxine (Pristiq) 50 mg 24 hr tablet, Take 1 tablet (50 mg) by mouth once daily., Disp: 30 tablet, Rfl: 2    folic acid (Folvite) 400 mcg tablet, Take by mouth once daily., Disp: , Rfl:     norethindrone (Micronor) 0.35 mg tablet, Take 1 tablet (0.35 mg) by mouth once daily., Disp: 90  tablet, Rfl: 4    pyridoxine (Vitamin B-6) 50 mg tablet, Take 1 tablet (50 mg) by mouth once daily., Disp: 30 tablet, Rfl: 11    wheat dextrin (Benefiber Healthy Shape) 5 gram/7.4 gram powder, Take 1 teaspoon daily, Disp: 248 g, Rfl: 1    famotidine (Pepcid) 20 mg tablet, Take 1 tablet (20 mg) by mouth 2 times a day., Disp: 180 tablet, Rfl: 0    loratadine (Claritin Reditabs) 10 mg disintegrating tablet, Take 1 tablet (10 mg) by mouth 2 times a day., Disp: 180 tablet, Rfl: 0    propranolol (Inderal) 10 mg tablet, Take 2 tablets (20 mg) by mouth 2 times a day., Disp: 120 tablet, Rfl: 2    Past Medical History:   Diagnosis Date    Abnormal Pap smear of cervix 2006    Allergic 2005    Anemia 2005    Anxiety 1992    Asthma 2000    Depression 1990    Aspen-Danlos syndrome (HHS-HCC)     Fibromyalgia     GERD (gastroesophageal reflux disease) 2001    Herpes 2005    HPV (human papilloma virus) infection 2004    Other conditions influencing health status     Patient denies significant medical history    Scoliosis 2008       Past Surgical History:   Procedure Laterality Date    COLPOSCOPY  ?    OTHER SURGICAL HISTORY  09/11/2018    Oral Surgery Tooth Extraction [place] Tooth    WISDOM TOOTH EXTRACTION  1998       Family History   Problem Relation Name Age of Onset    Anesthesia related problems Mother Merna Garcia     Depression Mother Merna Garcia     Diabetes Mother Merna Garcia     Hyperlipidemia Mother Merna Garcia     Mental illness Mother Merna Garcia     Vision loss Mother Merna Garcia     Alcohol abuse Father Jackson Del Angel     Depression Father Jackson Del Angel     Drug abuse Father Jackson Del Angel     Heart disease Father Jackson Del Angel     Hypertension Father Jackson Del Angel     Mental illness Father Jackson Del Angel     Asthma Brother Jackson II     Mental illness Brother Jackson II     Asthma Brother Luis     Birth defects Brother Luis     Intellectual Disability Brother Luis     Alcohol abuse Maternal Grandmother Tony      Alcohol abuse Maternal Grandfather Bernabe     Cancer Maternal Grandfather Bernabe     Alcohol abuse Paternal Grandmother Schalupe     Alcohol abuse Paternal Grandfather Johann        Objective   There were no vitals taken for this visit.    Physical Exam  Constitutional:       Comments: no acute distress, alert/conversational, appropriate affect, no focal neurological deficits noted via video appointment          Assessment/Plan   Problem List Items Addressed This Visit             ICD-10-CM       High    Post-acute sequelae of COVID-19 (PASC) - Primary U09.9     10/2024:  Dizziness, palpitations, cough, post-nasal drainage, GI complaints, concerns for MCAS, increased musculoskeletal pain, poor sleep, fatigue, brain fog, numbness and tingling  -dysautonomia labs to evaluate for any reversible causes of POTS, please have these done at any  lab. Some of them require 24hr urine collection and a fasting glucose tolerance test, the lab will help you get these all set up.  -continue to use the conservative measures to help with POTS, also increase propranolol to 20mg twice daily  -I will order an in-lab sleep study to further evaluate your sleep, recommendations to improve sleep are given as well  -list of PCP providers will be emailed to you to help you establish with a PCP  -for suspected MCAS, increase loratadine and famotidine to twice daily and continue to follow the low histamine diet  -referral to [facility] Psychology support group, you will be called to set up this appointment  -continue to work with PT for pain and POTS  -we can consider referral to PM&R provider Dr. Dee Dee Garsia for pain management in light of EDS, another option would be Pain Management provider Dr. Ochoa who offers low dose Ketamine infusions, the [facility] pain clinic offers high-dose Ketamine infusions  -for ADHD concerns, you can consider trying a stimulant medication with your Psychiatrist  -tips given for brain fog and fatigue  management, if no improvement will consider referral to virtual OT for cognitive rehab and fatigue management         Relevant Medications    loratadine (Claritin Reditabs) 10 mg disintegrating tablet    famotidine (Pepcid) 20 mg tablet    Other Relevant Orders    [place] DYS2 panel; [facility]; DYS2 - Miscellaneous Test    Thyroid Peroxidase (TPO) Antibody    Basic Metabolic Panel    C-Reactive Protein    Carnitine Panel, Blood    Catecholamines, Fractionated, Plasma    Anti-Thyroglobulin Antibody    BECKI + WAYNE Panel    Vitamin B12    Thyroid Stimulating Hormone    Thyroxine, Total    Triiodothyronine, Free    Sedimentation Rate    Rheumatoid Factor    Serum Protein Electrophoresis + Immunofixation    Anti-Parietal Cell Antibody    Paraneoplastic Autoantibodies Evaluation    Mycoplasma Pneumoniae Antibody, IgM    Mycoplasma Pneumoniae Antibody, IgG    Homocysteine    Glucose Tolerance Test, 3 hour (Non-Pregnancy)    Cryoglobulin    Copper, Blood    Coenzyme Q10    Ceruloplasmin    Celiac Panel    CBC and Auto Differential    Methylmalonic Acid    Aldolase    Vitamin B6    Vitamin E    Copper, Urine    Metanephrines, Fractionated, Urine (24 hour or Random)    Organic Acids, Urine    Porphobilinogen, 24 Hour Urine    Aminolevulinic Acid (ALA), Urine    Urine Protein Electrophoresis + Immunofixation    [place]/Lambda Free Light Chain, Serum    Voltage-Gated Calcium Channel P/Q type and N-type (VGCC) Antibody Panel; [facility]; 5049976 - Miscellaneous Test    Early Sjögren’s Syndrome Profile (SP-1, CA-6, PSP - IgG, IgA, IgM); [facility]); 35447 - Miscellaneous Test    In-Center Sleep Study    Referral to Psychology       Medium    POTS (postural orthostatic tachycardia syndrome) G90.A    Relevant Medications    propranolol (Inderal) 10 mg tablet    Other Relevant Orders    [place] DYS2 panel; [facility]; DYS2 - Miscellaneous Test    Thyroid Peroxidase (TPO) Antibody    Basic Metabolic  Panel    C-Reactive Protein    Carnitine Panel, Blood    Catecholamines, Fractionated, Plasma    Anti-Thyroglobulin Antibody    BECKI + WAYNE Panel    Vitamin B12    Thyroid Stimulating Hormone    Thyroxine, Total    Triiodothyronine, Free    Sedimentation Rate    Rheumatoid Factor    Serum Protein Electrophoresis + Immunofixation    Anti-Parietal Cell Antibody    Paraneoplastic Autoantibodies Evaluation    Mycoplasma Pneumoniae Antibody, IgM    Mycoplasma Pneumoniae Antibody, IgG    Homocysteine    Glucose Tolerance Test, 3 hour (Non-Pregnancy)    Cryoglobulin    Copper, Blood    Coenzyme Q10    Ceruloplasmin    Celiac Panel    CBC and Auto Differential    Methylmalonic Acid    Aldolase    Vitamin B6    Vitamin E    Copper, Urine    Metanephrines, Fractionated, Urine (24 hour or Random)    Organic Acids, Urine    Porphobilinogen, 24 Hour Urine    Aminolevulinic Acid (ALA), Urine    Urine Protein Electrophoresis + Immunofixation    [place]/Lambda Free Light Chain, Serum    Voltage-Gated Calcium Channel P/Q type and N-type (VGCC) Antibody Panel; [facility]; [id number] - Miscellaneous Test    Early Sjögren’s Syndrome Profile (SP-1, CA-6, PSP - IgG, IgA, IgM); [facility] ([facility]); 10101 - Miscellaneous Test    In-Center Sleep Study    Referral to Psychology    Diarrhea R19.7     Other Visit Diagnoses         Codes    Generalized abdominal pain     R10.84    Relevant Medications    loratadine (Claritin Reditabs) 10 mg disintegrating tablet    famotidine (Pepcid) 20 mg tablet    Chronic fatigue     R53.82    Relevant Orders    In-Center Sleep Study

## 2024-10-17 ENCOUNTER — DOCUMENTATION WITH CHARGES (OUTPATIENT)
Dept: OTHER | Facility: CLINIC | Age: 39
End: 2024-10-17
Payer: MEDICAID

## 2024-10-17 PROBLEM — A60.00 GENITAL HERPES: Status: ACTIVE | Noted: 2024-10-17

## 2024-10-17 PROBLEM — F33.1 MAJOR DEPRESSIVE DISORDER, RECURRENT, MODERATE: Status: ACTIVE | Noted: 2024-02-26

## 2024-10-17 PROBLEM — D51.0 PERNICIOUS ANEMIA: Status: ACTIVE | Noted: 2024-10-17

## 2024-10-17 PROBLEM — F32.A DEPRESSIVE DISORDER: Status: ACTIVE | Noted: 2024-10-17

## 2024-10-17 PROBLEM — K76.0 NON-ALCOHOLIC FATTY LIVER DISEASE: Status: ACTIVE | Noted: 2017-03-28

## 2024-10-17 PROBLEM — F43.10 PTSD (POST-TRAUMATIC STRESS DISORDER): Status: ACTIVE | Noted: 2019-02-11

## 2024-10-17 PROBLEM — R19.7 DIARRHEA: Status: ACTIVE | Noted: 2024-10-17

## 2024-10-17 PROBLEM — N89.8 VAGINAL IRRITATION: Status: RESOLVED | Noted: 2024-02-10 | Resolved: 2024-10-17

## 2024-10-17 PROBLEM — M41.9 SCOLIOSIS: Status: ACTIVE | Noted: 2024-10-17

## 2024-10-17 PROBLEM — J45.30 MILD PERSISTENT ASTHMA, UNCOMPLICATED (HHS-HCC): Status: ACTIVE | Noted: 2024-10-17

## 2024-10-17 PROBLEM — E55.9 VITAMIN D DEFICIENCY: Status: ACTIVE | Noted: 2024-03-28

## 2024-10-17 PROBLEM — U07.1 DISEASE DUE TO SEVERE ACUTE RESPIRATORY SYNDROME CORONAVIRUS 2 (SARS-COV-2): Status: ACTIVE | Noted: 2024-10-17

## 2024-10-17 PROBLEM — N94.9 VAGINAL DISCOMFORT: Status: ACTIVE | Noted: 2024-10-17

## 2024-10-17 PROBLEM — Z86.79 HISTORY OF HYPOTENSION: Status: ACTIVE | Noted: 2024-10-17

## 2024-10-17 PROBLEM — M79.7 FIBROMYALGIA: Status: ACTIVE | Noted: 2017-03-28

## 2024-10-17 PROBLEM — D64.9 ANEMIA: Status: ACTIVE | Noted: 2017-03-28

## 2024-10-17 PROBLEM — Q79.62 EHLERS-DANLOS, HYPERMOBILE TYPE (HHS-HCC): Status: ACTIVE | Noted: 2024-10-17

## 2024-10-17 PROBLEM — K52.9 GASTROENTERITIS: Status: ACTIVE | Noted: 2024-10-17

## 2024-10-17 PROBLEM — R82.90 ABNORMAL URINE FINDINGS: Status: ACTIVE | Noted: 2024-10-17

## 2024-10-17 PROBLEM — I34.1 MITRAL VALVE PROLAPSE: Status: ACTIVE | Noted: 2017-03-28

## 2024-10-17 PROBLEM — R63.4 WEIGHT LOSS, UNINTENTIONAL: Status: ACTIVE | Noted: 2024-10-17

## 2024-10-17 PROBLEM — R09.81 SINUS CONGESTION: Status: ACTIVE | Noted: 2024-10-17

## 2024-10-17 PROBLEM — R00.2 PALPITATIONS: Status: ACTIVE | Noted: 2024-10-17

## 2024-10-17 NOTE — PROGRESS NOTES
prolonged patient services without direct patient contact involved reviewing medical records including provider notes, diagnostic results, and patient surveys. Total time 30 minutes

## 2024-10-21 ENCOUNTER — TELEMEDICINE (OUTPATIENT)
Dept: OTHER | Facility: CLINIC | Age: 39
End: 2024-10-21
Payer: MEDICAID

## 2024-10-21 DIAGNOSIS — R19.7 DIARRHEA, UNSPECIFIED TYPE: ICD-10-CM

## 2024-10-21 DIAGNOSIS — R10.84 GENERALIZED ABDOMINAL PAIN: ICD-10-CM

## 2024-10-21 DIAGNOSIS — R53.82 CHRONIC FATIGUE: ICD-10-CM

## 2024-10-21 DIAGNOSIS — U09.9 POST-ACUTE SEQUELAE OF COVID-19 (PASC): Primary | ICD-10-CM

## 2024-10-21 DIAGNOSIS — G90.A POTS (POSTURAL ORTHOSTATIC TACHYCARDIA SYNDROME): ICD-10-CM

## 2024-10-21 PROCEDURE — 99417 PROLNG OP E/M EACH 15 MIN: CPT | Mod: U1 | Performed by: NURSE PRACTITIONER

## 2024-10-21 PROCEDURE — 99215 OFFICE O/P EST HI 40 MIN: CPT | Mod: GT,U1 | Performed by: NURSE PRACTITIONER

## 2024-10-21 RX ORDER — LORATADINE 10 MG
10 TABLET,DISINTEGRATING ORAL 2 TIMES DAILY
Qty: 180 TABLET | Refills: 0 | Status: SHIPPED | OUTPATIENT
Start: 2024-10-21

## 2024-10-21 RX ORDER — PROPRANOLOL HYDROCHLORIDE 10 MG/1
20 TABLET ORAL 2 TIMES DAILY
Qty: 120 TABLET | Refills: 2 | Status: SHIPPED | OUTPATIENT
Start: 2024-10-21 | End: 2025-01-19

## 2024-10-21 RX ORDER — FAMOTIDINE 20 MG/1
20 TABLET, FILM COATED ORAL 2 TIMES DAILY
Qty: 180 TABLET | Refills: 0 | Status: SHIPPED | OUTPATIENT
Start: 2024-10-21 | End: 2025-01-19

## 2024-10-21 NOTE — ASSESSMENT & PLAN NOTE
10/2024:  Dizziness, palpitations, cough, post-nasal drainage, GI complaints, concerns for MCAS, increased musculoskeletal pain, poor sleep, fatigue, brain fog, numbness and tingling  -dysautonomia labs to evaluate for any reversible causes of POTS, please have these done at any  lab. Some of them require 24hr urine collection and a fasting glucose tolerance test, the lab will help you get these all set up.  -continue to use the conservative measures to help with POTS, also increase propranolol to 20mg twice daily  -I will order an in-lab sleep study to further evaluate your sleep, recommendations to improve sleep are given as well  -list of PCP providers will be emailed to you to help you establish with a PCP  -for suspected MCAS, increase loratadine and famotidine to twice daily and continue to follow the low histamine diet  -referral to [facility] Psychology support group, you will be called to set up this appointment  -continue to work with PT for pain and POTS  -we can consider referral to PM&R provider Dr. Dee Dee Garsia for pain management in light of EDS, another option would be Pain Management provider Dr. Ochoa who offers low dose Ketamine infusions, the [facility] pain clinic offers high-dose Ketamine infusions  -for ADHD concerns, you can consider trying a stimulant medication with your Psychiatrist  -tips given for brain fog and fatigue management, if no improvement will consider referral to virtual OT for cognitive rehab and fatigue management

## 2024-11-05 ENCOUNTER — OFFICE VISIT (OUTPATIENT)
Dept: GASTROENTEROLOGY | Facility: CLINIC | Age: 39
End: 2024-11-05
Payer: MEDICAID

## 2024-11-05 ENCOUNTER — APPOINTMENT (OUTPATIENT)
Dept: GASTROENTEROLOGY | Facility: CLINIC | Age: 39
End: 2024-11-05
Payer: MEDICAID

## 2024-11-05 DIAGNOSIS — K58.9 IRRITABLE BOWEL SYNDROME, UNSPECIFIED TYPE: Primary | ICD-10-CM

## 2024-11-05 DIAGNOSIS — B00.9 HERPES: ICD-10-CM

## 2024-11-05 PROCEDURE — 99212 OFFICE O/P EST SF 10 MIN: CPT | Mod: GT | Performed by: NURSE PRACTITIONER

## 2024-11-05 PROCEDURE — 99212 OFFICE O/P EST SF 10 MIN: CPT | Performed by: NURSE PRACTITIONER

## 2024-11-05 RX ORDER — PEPPERMINT OIL 90 MG
1 CAPSULE, DELAYED, AND EXTENDED RELEASE ORAL 3 TIMES DAILY PRN
Qty: 30 CAPSULE | Refills: 3 | Status: SHIPPED | OUTPATIENT
Start: 2024-11-05

## 2024-11-05 RX ORDER — ACYCLOVIR 400 MG/1
400 TABLET ORAL 2 TIMES DAILY
Qty: 90 TABLET | Refills: 1 | Status: SHIPPED | OUTPATIENT
Start: 2024-11-05

## 2024-11-05 ASSESSMENT — ENCOUNTER SYMPTOMS
SHORTNESS OF BREATH: 0
NEUROLOGICAL NEGATIVE: 1
DIFFICULTY URINATING: 0
CHEST TIGHTNESS: 0
PSYCHIATRIC NEGATIVE: 1
FATIGUE: 0
STRIDOR: 0
WHEEZING: 0
RESPIRATORY NEGATIVE: 1
ROS GI COMMENTS: SEE HPI
ALLERGIC/IMMUNOLOGIC NEGATIVE: 1
COUGH: 0
CARDIOVASCULAR NEGATIVE: 1
CHILLS: 0
FEVER: 0
APNEA: 0
ENDOCRINE NEGATIVE: 1
DIAPHORESIS: 0
EYES NEGATIVE: 1
HEMATOLOGIC/LYMPHATIC NEGATIVE: 1
MUSCULOSKELETAL NEGATIVE: 1

## 2024-11-05 NOTE — PROGRESS NOTES
Subjective   Patient ID: Alanna Del Angel is a 39 y.o. adult who presents for Irritable Bowel Syndrome. Pronouns: they/them   (Pronounced SHEE--CO--MAIN)      The fiber has helped.   Has a BM every other day  Tried a lactose free diet but this did not make a difference     Foods like wheat, tomatoes, strawberries--this is newer  Since they had Covid       They did an elimination diet  Too much vinegar they will get diarrhea    Family Hx: Mom has diverticulitis         Review of Systems   Constitutional:  Negative for chills, diaphoresis, fatigue and fever.   HENT: Negative.     Eyes: Negative.    Respiratory: Negative.  Negative for apnea, cough, chest tightness, shortness of breath, wheezing and stridor.    Cardiovascular: Negative.    Gastrointestinal:         See HPI    Endocrine: Negative.    Genitourinary: Negative.  Negative for difficulty urinating.   Musculoskeletal: Negative.    Skin: Negative.    Allergic/Immunologic: Negative.    Neurological: Negative.    Hematological: Negative.    Psychiatric/Behavioral: Negative.         Objective   Physical Exam  Eyes:      General: Lids are normal.   Skin:     General: Skin is dry.   Neurological:      Mental Status: Alanna is alert and oriented to person, place, and time.   Psychiatric:         Mood and Affect: Mood normal.         Assessment/Plan   Diagnoses and all orders for this visit:  Diarrhea, unspecified type  -     wheat dextrin (Benefiber Healthy Shape) 5 gram/7.4 gram powder; Take 1 teaspoon daily  -     Calprotectin, Fecal; Future  -     C-Reactive Protein; Future  -     Tissue Transglutaminase IgA; Future  Generalized abdominal pain  -     wheat dextrin (Benefiber Healthy Shape) 5 gram/7.4 gram powder; Take 1 teaspoon daily  -     Calprotectin, Fecal; Future  -     C-Reactive Protein; Future  -     Tissue Transglutaminase IgA; Future     39 year old NB with a PMH of POTs presents today for alternating diarrhea, constipation, cramping which has now  resolved. They are using fiber. Complete stool tests and blood work. Discussed this is most likely IBS-can try Ibgard, continue fiber.     ROSA Toro-CNP 11/05/24 10:22 AM

## 2024-11-12 ENCOUNTER — APPOINTMENT (OUTPATIENT)
Dept: BEHAVIORAL HEALTH | Facility: CLINIC | Age: 39
End: 2024-11-12
Payer: MEDICAID

## 2024-11-12 DIAGNOSIS — F43.10 PTSD (POST-TRAUMATIC STRESS DISORDER): ICD-10-CM

## 2024-11-12 DIAGNOSIS — F41.1 GAD (GENERALIZED ANXIETY DISORDER): ICD-10-CM

## 2024-11-12 PROCEDURE — 99214 OFFICE O/P EST MOD 30 MIN: CPT | Performed by: PSYCHIATRY & NEUROLOGY

## 2024-11-12 RX ORDER — DESVENLAFAXINE 50 MG/1
50 TABLET, EXTENDED RELEASE ORAL DAILY
Qty: 90 TABLET | Refills: 1 | Status: SHIPPED | OUTPATIENT
Start: 2024-11-12 | End: 2025-05-11

## 2024-11-12 ASSESSMENT — PATIENT HEALTH QUESTIONNAIRE - PHQ9
2. FEELING DOWN, DEPRESSED OR HOPELESS: SEVERAL DAYS
10. IF YOU CHECKED OFF ANY PROBLEMS, HOW DIFFICULT HAVE THESE PROBLEMS MADE IT FOR YOU TO DO YOUR WORK, TAKE CARE OF THINGS AT HOME, OR GET ALONG WITH OTHER PEOPLE: NOT DIFFICULT AT ALL
1. LITTLE INTEREST OR PLEASURE IN DOING THINGS: SEVERAL DAYS
SUM OF ALL RESPONSES TO PHQ9 QUESTIONS 1 AND 2: 2

## 2024-11-12 ASSESSMENT — ENCOUNTER SYMPTOMS
DYSPHORIC MOOD: 0
NERVOUS/ANXIOUS: 0
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
## 2024-11-15 ENCOUNTER — LAB (OUTPATIENT)
Dept: LAB | Facility: LAB | Age: 39
End: 2024-11-15
Payer: MEDICAID

## 2024-11-15 ENCOUNTER — APPOINTMENT (OUTPATIENT)
Dept: BEHAVIORAL HEALTH | Facility: CLINIC | Age: 39
End: 2024-11-15
Payer: MEDICAID

## 2024-11-15 DIAGNOSIS — T78.1XXA ADVERSE FOOD REACTION, INITIAL ENCOUNTER: ICD-10-CM

## 2024-11-15 DIAGNOSIS — F41.1 GAD (GENERALIZED ANXIETY DISORDER): ICD-10-CM

## 2024-11-15 DIAGNOSIS — U09.9 POST-ACUTE SEQUELAE OF COVID-19 (PASC): ICD-10-CM

## 2024-11-15 DIAGNOSIS — R19.7 DIARRHEA, UNSPECIFIED TYPE: ICD-10-CM

## 2024-11-15 DIAGNOSIS — R10.84 GENERALIZED ABDOMINAL PAIN: ICD-10-CM

## 2024-11-15 DIAGNOSIS — F43.10 PTSD (POST-TRAUMATIC STRESS DISORDER): ICD-10-CM

## 2024-11-15 DIAGNOSIS — F43.20 ADJUSTMENT DISORDER, UNSPECIFIED TYPE: Primary | ICD-10-CM

## 2024-11-15 DIAGNOSIS — G90.A POTS (POSTURAL ORTHOSTATIC TACHYCARDIA SYNDROME): ICD-10-CM

## 2024-11-15 PROCEDURE — 84481 FREE ASSAY (FT-3): CPT

## 2024-11-15 PROCEDURE — 83516 IMMUNOASSAY NONANTIBODY: CPT

## 2024-11-15 PROCEDURE — 83519 RIA NONANTIBODY: CPT

## 2024-11-15 PROCEDURE — 85025 COMPLETE CBC W/AUTO DIFF WBC: CPT

## 2024-11-15 PROCEDURE — 90791 PSYCH DIAGNOSTIC EVALUATION: CPT

## 2024-11-15 PROCEDURE — 86235 NUCLEAR ANTIGEN ANTIBODY: CPT

## 2024-11-15 PROCEDURE — 83090 ASSAY OF HOMOCYSTEINE: CPT

## 2024-11-15 PROCEDURE — 86376 MICROSOMAL ANTIBODY EACH: CPT

## 2024-11-15 PROCEDURE — 86038 ANTINUCLEAR ANTIBODIES: CPT

## 2024-11-15 PROCEDURE — 83520 IMMUNOASSAY QUANT NOS NONAB: CPT

## 2024-11-15 PROCEDURE — 82390 ASSAY OF CERULOPLASMIN: CPT

## 2024-11-15 PROCEDURE — 82607 VITAMIN B-12: CPT

## 2024-11-15 PROCEDURE — 84155 ASSAY OF PROTEIN SERUM: CPT

## 2024-11-15 PROCEDURE — 80048 BASIC METABOLIC PNL TOTAL CA: CPT

## 2024-11-15 PROCEDURE — 86800 THYROGLOBULIN ANTIBODY: CPT

## 2024-11-15 PROCEDURE — 83521 IG LIGHT CHAINS FREE EACH: CPT

## 2024-11-15 PROCEDURE — 84165 PROTEIN E-PHORESIS SERUM: CPT

## 2024-11-15 PROCEDURE — 86334 IMMUNOFIX E-PHORESIS SERUM: CPT

## 2024-11-15 PROCEDURE — 86225 DNA ANTIBODY NATIVE: CPT

## 2024-11-15 PROCEDURE — 84436 ASSAY OF TOTAL THYROXINE: CPT

## 2024-11-15 PROCEDURE — 86140 C-REACTIVE PROTEIN: CPT

## 2024-11-15 PROCEDURE — 86431 RHEUMATOID FACTOR QUANT: CPT

## 2024-11-15 PROCEDURE — 84443 ASSAY THYROID STIM HORMONE: CPT

## 2024-11-15 PROCEDURE — 86596 VOLTAGE-GTD CA CHNL ANTB EA: CPT

## 2024-11-15 PROCEDURE — 85652 RBC SED RATE AUTOMATED: CPT

## 2024-11-15 PROCEDURE — 86255 FLUORESCENT ANTIBODY SCREEN: CPT

## 2024-11-15 NOTE — PROGRESS NOTES
Solution-Focused Therapy Initial Assessment    Alanna Del Angel, a 39 y.o. adult, for initial evaluation visit.  Patient is referred by [facility]    Session Time:   Start: 10:03 AM   End: 11:05 AM   Accompanied by: Self    An interactive audio and video telecommunication system which permits real time communications between the patient (at the originating site) and provider (at the distant site) was utilized to provide this telehealth service.     Verbal consent was requested and obtained from Alanna Del Angel on this date, 11/15/24 for a telehealth visit.     Session conducted virtually via HIPAA compliant video.  Patient was provided with informed consent.  Pronounced: (She jabari watnikia)    CHIEF COMPLAINT  Chief complaint: Anxiety, Long-haul COVID symptoms, Chronic Illness, PTSD/Trauma, and COVID concern/worries/anxiety  Duration of chief complaint: more than 1 year  Diagnoses: Generalized Anxiety Disorder and PTSD  Symptoms:  Sleep Disturbance, Low Energy, Nausea, Frequent Headaches, Chest Pains/Palpitations, Dizzy/Lightheadedness, Anxious/Tense/Worried, Depressed/Sad, Angry/Irritable, Social Withdrawal, Crying Spells, Poor Concentration, Memory Problems, and Thoughts of Death or Dying  Functional status: Moderate functioning in emotional, physical, and cognitive domains.    MENTAL STATUS EVALUATION  General Appearance & Grooming: Appropriate  Motor Activity:  Appropriate  Behavior: Cooperative  Speech: Appropriate, clear, normal rate, volume  Mood: Normal  Affect: normal  Thought Process: Appropriate, logical  Thought Content: Appropriate  Sensorium/Cognition: No impairment and Alert & Oriented x 3  Insight: Appropriate awareness of problem(s)  Eye Contact: Average  Judgment: Intact  Interview Behavior:  Appropriate  Hallucinations/Delusions:  none, denied     PATIENT DISCUSSION/SUMMARY PLAN  Pt diagnosis: Adjustment Disorder, Generalized Anxiety Disorder, and PTSD  Pt would like to:  have other  perspectives about what she is going through; get more information, understand what she's going through better. Can I help me to help other people?   Pt eligible for, informed about, and agreeable to participation in weekly [facility] COVID group with Cognitive Behavioral focus (6 sessions).  Treatment plan: Increase functioning in emotional, physical, and cognitive domains.  Group appointment arranged for: 11/25/2024, 1700, with a plan to meet Weekly after.      Follow Up Scheduled for:  11/25/2024

## 2024-11-16 LAB
ANION GAP SERPL CALC-SCNC: 15 MMOL/L (ref 10–20)
BASOPHILS # BLD AUTO: 0.12 X10*3/UL (ref 0–0.1)
BASOPHILS NFR BLD AUTO: 0.9 %
BUN SERPL-MCNC: 8 MG/DL (ref 6–23)
CALCIUM SERPL-MCNC: 9.2 MG/DL (ref 8.6–10.6)
CENTROMERE B AB SER-ACNC: <0.2 AI
CERULOPLASMIN SERPL-MCNC: 35 MG/DL (ref 20–60)
CHLORIDE SERPL-SCNC: 103 MMOL/L (ref 98–107)
CHROMATIN AB SERPL-ACNC: <0.2 AI
CO2 SERPL-SCNC: 22 MMOL/L (ref 21–32)
CREAT SERPL-MCNC: 0.79 MG/DL (ref 0.5–1.3)
CRP SERPL-MCNC: 0.25 MG/DL
DSDNA AB SER-ACNC: <1 IU/ML
EGFRCR SERPLBLD CKD-EPI 2021: >90 ML/MIN/1.73M*2
ENA JO1 AB SER QL IA: <0.2 AI
ENA RNP AB SER IA-ACNC: <0.2 AI
ENA SCL70 AB SER QL IA: <0.2 AI
ENA SM AB SER IA-ACNC: <0.2 AI
ENA SM+RNP AB SER QL IA: <0.2 AI
ENA SS-A AB SER IA-ACNC: <0.2 AI
ENA SS-B AB SER IA-ACNC: <0.2 AI
EOSINOPHIL # BLD AUTO: 0.41 X10*3/UL (ref 0–0.7)
EOSINOPHIL NFR BLD AUTO: 3.2 %
ERYTHROCYTE [DISTWIDTH] IN BLOOD BY AUTOMATED COUNT: 12.1 % (ref 11.5–14.5)
ERYTHROCYTE [SEDIMENTATION RATE] IN BLOOD BY WESTERGREN METHOD: 24 MM/H (ref 0–20)
GLIADIN PEPTIDE IGA SER IA-ACNC: <1 U/ML
GLUCOSE SERPL-MCNC: 116 MG/DL (ref 74–99)
HCT VFR BLD AUTO: 44.2 % (ref 36–52)
HGB BLD-MCNC: 14.4 G/DL (ref 12–17.5)
IMM GRANULOCYTES # BLD AUTO: 0.03 X10*3/UL (ref 0–0.7)
IMM GRANULOCYTES NFR BLD AUTO: 0.2 % (ref 0–0.9)
LYMPHOCYTES # BLD AUTO: 5 X10*3/UL (ref 1.2–4.8)
LYMPHOCYTES NFR BLD AUTO: 39.3 %
MCH RBC QN AUTO: 29.8 PG (ref 26–34)
MCHC RBC AUTO-ENTMCNC: 32.6 G/DL (ref 32–36)
MCV RBC AUTO: 92 FL (ref 80–100)
MONOCYTES # BLD AUTO: 0.66 X10*3/UL (ref 0.1–1)
MONOCYTES NFR BLD AUTO: 5.2 %
NEUTROPHILS # BLD AUTO: 6.5 X10*3/UL (ref 1.2–7.7)
NEUTROPHILS NFR BLD AUTO: 51.2 %
NRBC BLD-RTO: 0 /100 WBCS (ref 0–0)
PLATELET # BLD AUTO: 335 X10*3/UL (ref 150–450)
POTASSIUM SERPL-SCNC: 3.9 MMOL/L (ref 3.5–5.3)
PROT SERPL-MCNC: 7.6 G/DL (ref 6.4–8.2)
RBC # BLD AUTO: 4.83 X10*6/UL (ref 4–5.9)
RHEUMATOID FACT SER NEPH-ACNC: <10 IU/ML (ref 0–15)
RIBOSOMAL P AB SER-ACNC: <0.2 AI
SODIUM SERPL-SCNC: 136 MMOL/L (ref 136–145)
T3FREE SERPL-MCNC: 3.1 PG/ML (ref 2.3–4.2)
T4 SERPL-MCNC: 7 UG/DL (ref 4.5–11.1)
THYROPEROXIDASE AB SERPL-ACNC: 40 IU/ML
TSH SERPL-ACNC: 1.82 MIU/L (ref 0.44–3.98)
TTG IGA SER IA-ACNC: <1 U/ML
VIT B12 SERPL-MCNC: 655 PG/ML (ref 211–911)
WBC # BLD AUTO: 12.7 X10*3/UL (ref 4.4–11.3)

## 2024-11-17 LAB
HCYS SERPL-SCNC: 27.26 UMOL/L (ref 5–13.9)
KAPPA LC SERPL-MCNC: 1.58 MG/DL (ref 0.33–1.94)
KAPPA LC/LAMBDA SER: 1.26 {RATIO} (ref 0.26–1.65)
LAMBDA LC SERPL-MCNC: 1.25 MG/DL (ref 0.57–2.63)
THYROGLOB AB SERPL-ACNC: <0.9 IU/ML (ref 0–4)

## 2024-11-18 LAB
ALDOLASE SERPL-CCNC: 1.9 U/L (ref 1.2–7.6)
ANA SER QL HEP2 SUBST: NEGATIVE
COPPER SERPL-MCNC: 129.5 UG/DL (ref 80–155)
GLIADIN PEPTIDE IGG SER IA-ACNC: <0.56 FLU (ref 0–4.99)
M PNEUMO IGG SER IA-ACNC: 0.03 U/L
M PNEUMO IGM SER IA-ACNC: 0.54 U/L
PCA IGG SER-ACNC: 6.9 UNITS (ref 0–24.9)
TTG IGG SER IA-ACNC: <0.82 FLU (ref 0–4.99)

## 2024-11-19 LAB
A-TOCOPHEROL VIT E SERPL-MCNC: 11.6 MG/L (ref 5.5–18)
ALBUMIN: 4.5 G/DL (ref 3.4–5)
ALPHA 1 GLOBULIN: 0.3 G/DL (ref 0.2–0.6)
ALPHA 2 GLOBULIN: 0.9 G/DL (ref 0.4–1.1)
BETA GLOBULIN: 1 G/DL (ref 0.5–1.2)
BETA+GAMMA TOCOPHEROL SERPL-MCNC: 0.6 MG/L (ref 0–6)
GAMMA GLOBULIN: 1 G/DL (ref 0.5–1.4)
IMMUNOFIXATION COMMENT: NORMAL
PATH REVIEW - SERUM IMMUNOFIXATION: NORMAL
PATH REVIEW-SERUM PROTEIN ELECTROPHORESIS: NORMAL
PROTEIN ELECTROPHORESIS COMMENT: NORMAL

## 2024-11-20 ENCOUNTER — TELEPHONE (OUTPATIENT)
Dept: ALLERGY | Facility: CLINIC | Age: 39
End: 2024-11-20
Payer: MEDICAID

## 2024-11-20 LAB
3OH-DODECANOYLCARN SERPL-SCNC: <0.01 UMOL/L
3OH-ISOVALERYLCARN SERPL-SCNC: <0.01 UMOL/L
3OH-LINOLEOYLCARN SERPL-SCNC: <0.01 UMOL/L
3OH-OLEOYLCARN SERPL-SCNC: <0.01 UMOL/L
3OH-PALMITOLEYLCARN SERPL-SCNC: 0.01 UMOL/L
3OH-PALMITOYLCARN SERPL-SCNC: <0.01 UMOL/L
3OH-STEAROYLCARN SERPL-SCNC: <0.01 UMOL/L
3OH-TDECANOYLCARN SERPL-SCNC: <0.01 UMOL/L
3OH-TDECENOYLCARN SERPL-SCNC: <0.01 UMOL/L
ACETYLCARN SERPL-SCNC: 6.21 UMOL/L (ref 2.93–15.06)
ACYLCARNITINE PATTERN SERPL-IMP: ABNORMAL
BUTYRYL+ISOBUTYRYLCARN SERPL-SCNC: 0.15 UMOL/L
CARN ESTERS SERPL-SCNC: 10 UMOL/L (ref 5–29)
CARN ESTERS/C0 SERPL-SRTO: 0.3 RATIO (ref 0.1–1)
CARNITINE FREE SERPL-SCNC: 29 UMOL/L (ref 25–60)
CARNITINE SERPL-SCNC: 39 UMOL/L (ref 34–86)
DECANOYLCARN SERPL-SCNC: 0.98 UMOL/L
DECENOYLCARN SERPL-SCNC: 0.75 UMOL/L
DODECANOYLCARN SERPL-SCNC: 0.11 UMOL/L
DODECENOYLCARN SERPL-SCNC: 0.09 UMOL/L
DOPAMINE SERPL-MCNC: <30 PG/ML (ref 0–48)
EPINEPH PLAS-MCNC: 46 PG/ML (ref 0–62)
GLUTARYLCARN SERPL-SCNC: 0.1 UMOL/L
HEXANOYLCARN SERPL-SCNC: 0.37 UMOL/L
ISOVALERYL+MEBUTYRYLCARN SERPL-SCNC: 0.04 UMOL/L
LINOLEOYLCARN SERPL-SCNC: 0.06 UMOL/L
METHYLMALONATE SERPL-SCNC: 0.14 UMOL/L (ref 0–0.4)
NOREPINEPH PLAS-MCNC: 804 PG/ML (ref 0–874)
OCTANOYLCARN SERPL-SCNC: 0.77 UMOL/L
OCTENOYLCARN SERPL-SCNC: 0.31 UMOL/L
OLEOYLCARN SERPL-SCNC: 0.1 UMOL/L
PALMITOLEYLCARN SERPL-SCNC: 0.04 UMOL/L
PALMITOYLCARN SERPL-SCNC: 0.08 UMOL/L
PROPIONYLCARN SERPL-SCNC: 0.18 UMOL/L
SCAN RESULT: NORMAL
STEAROYLCARN SERPL-SCNC: 0.03 UMOL/L
TDECADIENOYLCARN SERPL-SCNC: 0.07 UMOL/L
TDECANOYLCARN SERPL-SCNC: 0.04 UMOL/L
TDECENOYLCARN SERPL-SCNC: 0.13 UMOL/L
TRYPTASE SERPL-MCNC: 7.2 UG/L

## 2024-11-20 NOTE — TELEPHONE ENCOUNTER
Result Communication    Resulted Orders   Tryptase   Result Value Ref Range    Tryptase 7.2 <=10.9 ug/L      Comment:      Performed By: [facility]  [address] [place] [id number]  : Kane Guzmán MD, PhD  CLIA Number: [id number]       11:03 AM      Results were successfully communicated with the patient and they acknowledged their understanding.

## 2024-11-21 PROBLEM — F43.20 ADJUSTMENT DISORDER: Status: ACTIVE | Noted: 2024-11-21

## 2024-11-21 LAB
PYRIDOXAL PHOS SERPL-SCNC: 192.5 NMOL/L (ref 20–125)
SCAN RESULT: NORMAL
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
## 2024-11-22 LAB
AMPHIPHYSIN IGG SER QL IA: NEGATIVE
ANNOTATION COMMENT IMP: NORMAL
CV2 AB SERPL QL IF: NEGATIVE
GLIAL NUC TYPE 1 AB SER QL IF: NEGATIVE
HU1 AB SER QL: NEGATIVE
HU2 AB SER QL IF: NEGATIVE
HU3 AB SER QL: NEGATIVE
PARANEOPLASTIC AB SER-IMP: NORMAL
PCA-1 AB SER QL IF: NEGATIVE
PCA-2 AB SER QL IF: NEGATIVE
PCA-TR AB SER QL IF: NEGATIVE
VGCC-P/Q BIND IGG+IGM SER IA-SCNC: 0 NMOL/L
VGKC IGG+IGM SER IA-SCNC: 0 NMOL/L

## 2024-11-25 ENCOUNTER — APPOINTMENT (OUTPATIENT)
Dept: BEHAVIORAL HEALTH | Facility: CLINIC | Age: 39
End: 2024-11-25
Payer: MEDICAID

## 2024-11-25 LAB — SCAN RESULT: NORMAL

## 2024-11-26 LAB — SCAN RESULT: NORMAL

## 2024-12-02 ENCOUNTER — APPOINTMENT (OUTPATIENT)
Dept: BEHAVIORAL HEALTH | Facility: CLINIC | Age: 39
End: 2024-12-02
Payer: MEDICAID

## 2024-12-02 DIAGNOSIS — F43.20 ADJUSTMENT DISORDER, UNSPECIFIED TYPE: Primary | ICD-10-CM

## 2024-12-02 DIAGNOSIS — F41.1 GAD (GENERALIZED ANXIETY DISORDER): ICD-10-CM

## 2024-12-02 DIAGNOSIS — F43.10 PTSD (POST-TRAUMATIC STRESS DISORDER): ICD-10-CM

## 2024-12-02 PROCEDURE — 90853 GROUP PSYCHOTHERAPY: CPT

## 2024-12-03 NOTE — GROUP NOTE
Group Topic: Coping Skills   Group Date: 12/2/2024  Start Time:  5:00 PM  End Time:  6:00 PM  Facilitators: SHREYAS Garcia   Department: [facility]    Number of Participants: 2   Group Focus: coping skills and self-awareness  Treatment Modality: Patient-Centered Therapy  Interventions utilized were patient education and support  Purpose: insight or knowledge and self-care    Name: Alanna Del Angel YOB: 1985   MR: [id number]      Facilitator:   Level of Participation: active  Quality of Participation: appropriate/pleasant, attentive, engaged, motivated, and supportive  Interactions with others: appropriate and supportive  Mood/Affect: appropriate and positive  Cognition: coherent/clear and logical  Progress: Gaining insight or knowledge  Plan: continue with services

## 2024-12-03 NOTE — PROGRESS NOTES
[facility] Support and Cognitive Behavioral Therapy Group Note      Patient  Alanna Del Angel is a 39 y.o. adult, presenting for a group visit.   Chief Complaint   Patient presents with    AD (Adjustment Disorder)    Anxiety    PTSD (Post-Traumatic Stress Disorder)       An interactive audio and video telecommunication system which permits real time communications between the patient (at the originating site) and provider (at the distant site) was utilized to provide this telehealth service.   Verbal consent was requested and obtained from Alanna Del Angel on this date, 12/02/24 for a telehealth visit.     Session conducted virtually via HIPAA compliant video.  Patient was provided with informed consent.    [facility] COVID Support and Cognitive behavioral therapy group. Session #1.     Session Content  Explored self-compassion with long-haul COVID--discussed the importance of practicing self-compassion through non-judgment and acceptance of self (limiting self-comparison), self-care, and mindful awareness.  Group members shared their own struggles and experiences with practicing and maintaining self-compassion and limiting self-comparison, and filtering through the emotions that accompany.       Next Session:  12/9/2024

## 2024-12-09 ENCOUNTER — APPOINTMENT (OUTPATIENT)
Dept: BEHAVIORAL HEALTH | Facility: CLINIC | Age: 39
End: 2024-12-09
Payer: MEDICAID

## 2024-12-10 ENCOUNTER — HOSPITAL ENCOUNTER (OUTPATIENT)
Dept: RESPIRATORY THERAPY | Facility: HOSPITAL | Age: 39
Discharge: HOME | End: 2024-12-10
Payer: MEDICAID

## 2024-12-10 DIAGNOSIS — J45.30 MILD PERSISTENT ASTHMA WITHOUT COMPLICATION (HHS-HCC): ICD-10-CM

## 2024-12-10 PROCEDURE — 94726 PLETHYSMOGRAPHY LUNG VOLUMES: CPT | Performed by: INTERNAL MEDICINE

## 2024-12-10 PROCEDURE — 94729 DIFFUSING CAPACITY: CPT | Performed by: INTERNAL MEDICINE

## 2024-12-10 PROCEDURE — 94060 EVALUATION OF WHEEZING: CPT | Performed by: INTERNAL MEDICINE

## 2024-12-10 PROCEDURE — 94726 PLETHYSMOGRAPHY LUNG VOLUMES: CPT

## 2024-12-13 ENCOUNTER — TELEPHONE (OUTPATIENT)
Dept: ALLERGY | Facility: CLINIC | Age: 39
End: 2024-12-13
Payer: MEDICAID

## 2024-12-13 LAB
MGC ASCENT PFT - FEV1 - POST: 2.82
MGC ASCENT PFT - FEV1 - PRE: 2.8
MGC ASCENT PFT - FEV1 - PREDICTED: 2.77
MGC ASCENT PFT - FVC - POST: 3.34
MGC ASCENT PFT - FVC - PRE: 3.33
MGC ASCENT PFT - FVC - PREDICTED: 3.31

## 2024-12-16 ENCOUNTER — APPOINTMENT (OUTPATIENT)
Dept: BEHAVIORAL HEALTH | Facility: CLINIC | Age: 39
End: 2024-12-16
Payer: MEDICAID

## 2024-12-16 DIAGNOSIS — F43.20 ADJUSTMENT DISORDER, UNSPECIFIED TYPE: Primary | ICD-10-CM

## 2024-12-16 DIAGNOSIS — F43.10 PTSD (POST-TRAUMATIC STRESS DISORDER): ICD-10-CM

## 2024-12-16 DIAGNOSIS — F41.1 GAD (GENERALIZED ANXIETY DISORDER): ICD-10-CM

## 2024-12-16 PROCEDURE — 90853 GROUP PSYCHOTHERAPY: CPT

## 2024-12-16 NOTE — GROUP NOTE
Group Topic: Coping Skills   Group Date: 12/16/2024  Start Time:  5:00 PM  End Time:  6:00 PM  Facilitators: SHREYAS Garcia   Department: [facility]    Number of Participants: 2   Group Focus: coping skills and self-awareness  Treatment Modality: Patient-Centered Therapy  Interventions utilized were patient education and support  Purpose: coping skills and self-care    Name: Alanna Del Angel YOB: 1985   MR: [id number]      Facilitator:   Level of Participation: active  Quality of Participation: appropriate/pleasant, attentive, engaged, motivated, and supportive  Interactions with others: appropriate and supportive  Mood/Affect: appropriate and positive  Cognition: coherent/clear and logical  Progress: Gaining insight or knowledge  Plan: continue with services

## 2024-12-16 NOTE — PROGRESS NOTES
[facility] Support and Cognitive Behavioral Therapy Group Note      Patient  Alanna Del Angel is a 39 y.o. adult, presenting for a group visit.   Chief Complaint   Patient presents with    AD (Adjustment Disorder)    Anxiety    PTSD (Post-Traumatic Stress Disorder)       An interactive audio and video telecommunication system which permits real time communications between the patient (at the originating site) and provider (at the distant site) was utilized to provide this telehealth service.   Verbal consent was requested and obtained from Alanna Del Angel on this date, 12/16/24 for a telehealth visit.     Session conducted virtually via HIPAA compliant video.  Patient was provided with informed consent.    [facility] COVID Support and Cognitive behavioral therapy group. Session #2.     Session Content  Explored the Spoon Theory, and how to create an understandable, shared vocabulary about what it is like being ill for self and to share with loved ones/friends.  Discussed process of learning to ration energy through the day.  Group members shared their own strategies, experiences, and daily struggles with managing their energy through each day and communicating long-haul COVID symptoms and needs in their personal lives.    Next Session:  12/30/2024

## 2024-12-30 ENCOUNTER — APPOINTMENT (OUTPATIENT)
Dept: BEHAVIORAL HEALTH | Facility: CLINIC | Age: 39
End: 2024-12-30
Payer: MEDICAID

## 2025-01-06 ENCOUNTER — APPOINTMENT (OUTPATIENT)
Dept: BEHAVIORAL HEALTH | Facility: CLINIC | Age: 40
End: 2025-01-06
Payer: MEDICAID

## 2025-01-08 ENCOUNTER — LAB (OUTPATIENT)
Dept: LAB | Facility: LAB | Age: 40
End: 2025-01-08
Payer: MEDICAID

## 2025-01-08 ENCOUNTER — TELEMEDICINE (OUTPATIENT)
Dept: ALLERGY | Facility: HOSPITAL | Age: 40
End: 2025-01-08
Payer: MEDICAID

## 2025-01-08 ENCOUNTER — OFFICE VISIT (OUTPATIENT)
Dept: PRIMARY CARE | Facility: CLINIC | Age: 40
End: 2025-01-08
Payer: MEDICAID

## 2025-01-08 VITALS
RESPIRATION RATE: 16 BRPM | DIASTOLIC BLOOD PRESSURE: 78 MMHG | SYSTOLIC BLOOD PRESSURE: 121 MMHG | HEART RATE: 76 BPM | BODY MASS INDEX: 25.78 KG/M2 | TEMPERATURE: 97.1 F | WEIGHT: 151 LBS | HEIGHT: 64 IN | OXYGEN SATURATION: 100 %

## 2025-01-08 DIAGNOSIS — M79.7 FIBROMYALGIA: Primary | ICD-10-CM

## 2025-01-08 DIAGNOSIS — R68.2 DRY MOUTH: ICD-10-CM

## 2025-01-08 DIAGNOSIS — G90.A POTS (POSTURAL ORTHOSTATIC TACHYCARDIA SYNDROME): ICD-10-CM

## 2025-01-08 DIAGNOSIS — H04.123 DRY EYES: ICD-10-CM

## 2025-01-08 DIAGNOSIS — E53.8 VITAMIN B12 DEFICIENCY: ICD-10-CM

## 2025-01-08 DIAGNOSIS — U09.9 POST-ACUTE SEQUELAE OF COVID-19 (PASC): ICD-10-CM

## 2025-01-08 DIAGNOSIS — R10.84 GENERALIZED ABDOMINAL PAIN: Primary | ICD-10-CM

## 2025-01-08 DIAGNOSIS — R19.7 DIARRHEA, UNSPECIFIED TYPE: ICD-10-CM

## 2025-01-08 DIAGNOSIS — R53.82 CHRONIC FATIGUE: ICD-10-CM

## 2025-01-08 DIAGNOSIS — T78.1XXA ADVERSE FOOD REACTION, INITIAL ENCOUNTER: ICD-10-CM

## 2025-01-08 LAB
ALBUMIN SERPL BCP-MCNC: 4 G/DL (ref 3.4–5)
ALP SERPL-CCNC: 74 U/L (ref 33–120)
ALT SERPL W P-5'-P-CCNC: 13 U/L (ref 7–52)
ANION GAP SERPL CALC-SCNC: 13 MMOL/L (ref 10–20)
AST SERPL W P-5'-P-CCNC: 17 U/L (ref 9–39)
BILIRUB SERPL-MCNC: 0.2 MG/DL (ref 0–1.2)
BUN SERPL-MCNC: 13 MG/DL (ref 6–23)
CALCIUM SERPL-MCNC: 8.8 MG/DL (ref 8.6–10.6)
CHLORIDE SERPL-SCNC: 106 MMOL/L (ref 98–107)
CO2 SERPL-SCNC: 22 MMOL/L (ref 21–32)
CREAT SERPL-MCNC: 0.78 MG/DL (ref 0.5–1.3)
EGFRCR SERPLBLD CKD-EPI 2021: >90 ML/MIN/1.73M*2
ENA SS-A AB SER IA-ACNC: <0.2 AI
ENA SS-B AB SER IA-ACNC: <0.2 AI
GLUCOSE SERPL-MCNC: 97 MG/DL (ref 74–99)
POTASSIUM SERPL-SCNC: 4.4 MMOL/L (ref 3.5–5.3)
PROT SERPL-MCNC: 6.6 G/DL (ref 6.4–8.2)
PROT UR-ACNC: 12 MG/DL (ref 5–25)
SODIUM SERPL-SCNC: 137 MMOL/L (ref 136–145)
VIT B12 SERPL-MCNC: 465 PG/ML (ref 211–911)

## 2025-01-08 PROCEDURE — 3008F BODY MASS INDEX DOCD: CPT | Performed by: FAMILY MEDICINE

## 2025-01-08 PROCEDURE — 84150 ASSAY OF PROSTAGLANDIN: CPT

## 2025-01-08 PROCEDURE — 83520 IMMUNOASSAY QUANT NOS NONAB: CPT

## 2025-01-08 PROCEDURE — 84110 ASSAY OF PORPHOBILINOGEN: CPT

## 2025-01-08 PROCEDURE — 36415 COLL VENOUS BLD VENIPUNCTURE: CPT

## 2025-01-08 PROCEDURE — 83918 ORGANIC ACIDS TOTAL QUANT: CPT

## 2025-01-08 PROCEDURE — 99214 OFFICE O/P EST MOD 30 MIN: CPT | Performed by: FAMILY MEDICINE

## 2025-01-08 PROCEDURE — 82542 COL CHROMOTOGRAPHY QUAL/QUAN: CPT

## 2025-01-08 PROCEDURE — 99213 OFFICE O/P EST LOW 20 MIN: CPT | Mod: GT,U1 | Performed by: ALLERGY & IMMUNOLOGY

## 2025-01-08 PROCEDURE — 84166 PROTEIN E-PHORESIS/URINE/CSF: CPT

## 2025-01-08 PROCEDURE — 99213 OFFICE O/P EST LOW 20 MIN: CPT | Performed by: ALLERGY & IMMUNOLOGY

## 2025-01-08 PROCEDURE — 82135 ASSAY AMINOLEVULINIC ACID: CPT

## 2025-01-08 PROCEDURE — 80053 COMPREHEN METABOLIC PANEL: CPT

## 2025-01-08 PROCEDURE — 86235 NUCLEAR ANTIGEN ANTIBODY: CPT

## 2025-01-08 PROCEDURE — 82525 ASSAY OF COPPER: CPT

## 2025-01-08 PROCEDURE — 84156 ASSAY OF PROTEIN URINE: CPT

## 2025-01-08 PROCEDURE — 1036F TOBACCO NON-USER: CPT | Performed by: FAMILY MEDICINE

## 2025-01-08 PROCEDURE — 83835 ASSAY OF METANEPHRINES: CPT

## 2025-01-08 PROCEDURE — 1036F TOBACCO NON-USER: CPT | Performed by: ALLERGY & IMMUNOLOGY

## 2025-01-08 PROCEDURE — 86335 IMMUNFIX E-PHORSIS/URINE/CSF: CPT

## 2025-01-08 PROCEDURE — 82607 VITAMIN B-12: CPT

## 2025-01-08 ASSESSMENT — ENCOUNTER SYMPTOMS
HEMATOLOGIC/LYMPHATIC NEGATIVE: 1
ARTHRALGIAS: 1
EYES NEGATIVE: 1
FATIGUE: 1
ALLERGIC/IMMUNOLOGIC NEGATIVE: 1
ROS SKIN COMMENTS: DRY SKIN
MYALGIAS: 1
CARDIOVASCULAR NEGATIVE: 1
RESPIRATORY NEGATIVE: 1

## 2025-01-08 ASSESSMENT — PAIN SCALES - GENERAL: PAINLEVEL_OUTOF10: 5
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
## 2025-01-08 NOTE — PROGRESS NOTES
Virtual or Telephone Consent    An interactive audio and video telecommunication system which permits real time communications between the patient (at the originating site) and provider (at the distant site) was utilized to provide this telehealth service.   Verbal consent was requested and obtained from Alanna Del Angel on this date, 01/08/25 for a telehealth visit.       Alanna Del Angel presents for follow-up evaluation today.  They note that since last visit, they have developed more joint pains, dry skin, dry mouth, dry eyes and had an early Sjogren's panel which is positive.  They will be seeing rheumatology.  They had saw the long COVID clinic and had labs drawn for long COVID evaluation today.  They also performed urine mast cell studies today which are pending.      Review of Systems   Constitutional:  Positive for fatigue.   HENT: Negative.          Dry eyes and dry mouth   Eyes: Negative.    Respiratory: Negative.     Cardiovascular: Negative.    Musculoskeletal:  Positive for arthralgias and myalgias.   Skin: Negative.         Dry skin   Allergic/Immunologic: Negative.    Hematological: Negative.         Vital signs:  LMP 12/26/2024   Not obtained due to virtual visit    Physical Exam:  General: Alert, oriented, no acute distress  Breathing is not labored.  No cough, speaking in full sentences  Skin without noticeable rash or swelling     Impression:  1. Generalized abdominal pain    2. Diarrhea, unspecified type        Assessment and Plan:  Since last visit repeat serum tryptase during reaction was negative and not significantly elevated from baseline, and specific IgE testing was negative to oat, tomato, wheat, strawberry.  Urine studies of 24-hour N-methyl histamine, and 24-hour 2, 3-Dinor 11 beta-prostaglandin F2 alpha were ordered and results are pending.  PFTs were within normal limits.      They note that since last visit predominant issues have been joint pains, dry skin, dry mouth and eyes.   Early Sjogren's antibody panel was positive and they will be seeing rheumatology for evaluation.  We discussed that so far labs have not revealed mast cell activation syndrome, however will follow-up with results of urine studies and still encouraged to get another serum tryptase done if has suspected mast cell mediated reaction.      May continue cetirizine 10 mg once to twice daily and famotidine.    Will contact with results of testing and determine follow-up pending results.

## 2025-01-09 ENCOUNTER — TELEPHONE (OUTPATIENT)
Dept: OTHER | Facility: CLINIC | Age: 40
End: 2025-01-09
Payer: MEDICAID

## 2025-01-10 LAB
ALBUMIN MFR UR ELPH: 27.3 %
ALPHA1 GLOB MFR UR ELPH: 10.4 %
ALPHA2 GLOB MFR UR ELPH: 18 %
B-GLOBULIN MFR UR ELPH: 25.5 %
COLLECT DURATION TIME SPEC: 24 HR
CREAT 24H UR-MRATE: 1122 MG/D (ref 700–1600)
CREAT UR-MCNC: 66 MG/DL
D-ALA 24H UR-SRATE: 17 UMOL/D (ref 0–60)
D-ALA UR-SCNC: 10 UMOL/L (ref 0–35)
GAMMA GLOB MFR UR ELPH: 18.8 %
IMMUNOFIXATION COMMENT: NORMAL
PATH REVIEW - URINE IMMUNOFIXATION: NORMAL
PATH REVIEW-URINE PROTEIN ELECTROPHORESIS: NORMAL
SPECIMEN VOL ?TM UR: 1700 ML
TRYPTASE SERPL-MCNC: 6.5 UG/L
URINE ELECTROPHORESIS COMMENT: NORMAL

## 2025-01-11 LAB
COLLECT DURATION TIME SPEC: 24 HR
CREAT 24H UR-MRATE: 1122 MG/D (ref 700–1600)
CREAT UR-MCNC: 66 MG/DL
METANEPH 24H UR-MCNC: 54 UG/L
METANEPH 24H UR-MRATE: 92 UG/D (ref 36–229)
METANEPH+NORMETANEPH UR-IMP: NORMAL
METANEPH/CREAT 24H UR: 82 UG/G CRT (ref 0–300)
NORMETANEPHRINE 24H UR-MCNC: 152 UG/L
NORMETANEPHRINE 24H UR-MRATE: 258 UG/D (ref 95–650)
NORMETANEPHRINE/CREAT 24H UR: 230 UG/G CRT (ref 0–400)
SPECIMEN VOL ?TM UR: 1700 ML

## 2025-01-13 ENCOUNTER — SOCIAL WORK (OUTPATIENT)
Dept: BEHAVIORAL HEALTH | Facility: CLINIC | Age: 40
End: 2025-01-13
Payer: MEDICAID

## 2025-01-13 DIAGNOSIS — R53.82 CHRONIC FATIGUE: ICD-10-CM

## 2025-01-13 DIAGNOSIS — T78.1XXA ADVERSE FOOD REACTION, INITIAL ENCOUNTER: ICD-10-CM

## 2025-01-13 DIAGNOSIS — F43.20 ADJUSTMENT DISORDER, UNSPECIFIED TYPE: Primary | ICD-10-CM

## 2025-01-13 DIAGNOSIS — F41.1 GAD (GENERALIZED ANXIETY DISORDER): ICD-10-CM

## 2025-01-13 DIAGNOSIS — R10.84 GENERALIZED ABDOMINAL PAIN: ICD-10-CM

## 2025-01-13 DIAGNOSIS — R19.7 DIARRHEA, UNSPECIFIED TYPE: Primary | ICD-10-CM

## 2025-01-13 DIAGNOSIS — F43.10 PTSD (POST-TRAUMATIC STRESS DISORDER): ICD-10-CM

## 2025-01-13 PROCEDURE — 90853 GROUP PSYCHOTHERAPY: CPT

## 2025-01-14 LAB
COLLECT DURATION TIME UR: 24 H
CREAT 24H UR-MCNC: 67 MG/DL
CREAT 24H UR-MRATE: 1139 MG/24 H (ref 603–1783)
ME-HISTAMINE/CREAT 24H UR: 101 MCG/G CR (ref 30–200)
SPECIMEN VOL 24H UR: 1700 ML

## 2025-01-14 NOTE — GROUP NOTE
Group Topic: Loss and Grief Issues   Group Date: 1/13/2025  Start Time:  5:00 PM  End Time:  6:00 PM  Facilitators: SHREYAS Garcia   Department: [facility]    Number of Participants: 2   Group Focus: coping skills  Treatment Modality: Patient-Centered Therapy  Interventions utilized were patient education and support  Purpose: coping skills and insight or knowledge    Name: Alanna Del Angel YOB: 1985   MR: [id number]      Facilitator:   Level of Participation: active  Quality of Participation: appropriate/pleasant, attentive, engaged, motivated, and supportive  Interactions with others: appropriate and supportive  Mood/Affect: appropriate and positive  Cognition: coherent/clear and logical  Progress: Gaining insight or knowledge  Plan: continue with services

## 2025-01-14 NOTE — PROGRESS NOTES
"[facility] COVID Support and Cognitive Behavioral Therapy Group Note      Patient  Alanna Del Angel is a 39 y.o. adult, presenting for a group visit.   Chief Complaint   Patient presents with    AD (Adjustment Disorder)    Anxiety    PTSD (Post-Traumatic Stress Disorder)       An interactive audio and video telecommunication system which permits real time communications between the patient (at the originating site) and provider (at the distant site) was utilized to provide this telehealth service.   Verbal consent was requested and obtained from Alanna Del Angel on this date, 01/13/25 for a telehealth visit.     Session conducted virtually via HIPAA compliant video.  Patient was provided with informed consent.    [place] COVID Support and Cognitive behavioral therapy group. Session #3.     Session Content  Explored two models of grief for chronic illness--the 7 stages of grief and the Dual Process Model Theory of grief.  Group members shared the ups and downs of navigating through grief with one another--from the many difficulties to the unexpected opportunities and \"successes\".  Group members shared how elusive \"acceptance\" is, but also how it is the goal that they are all striving toward.    Group members discussed the inherent difficulty of grieving for their past selves, and integrating chronic illness and its limitations into their conceptions of their new selves, so that they can let go instead of dwelling on how things used to be or despairing about whether they will ever be free from the challenges of longCOVID symptoms.    Next Session:  1/20/25    "

## 2025-01-15 LAB
2,3-DINOR 11B-PG F2A/CREAT 24H UR: 539 PG/MG CR
2OXO3ME-VALERATE/CREAT UR-SRTO: NOT DETECTED (ref 0–10)
2OXOISOCAPROATE/CREAT UR-SRTO: NOT DETECTED (ref 0–4)
2OXOISOVALERATE/CREAT UR-SRTO: NOT DETECTED (ref 0–4)
4OH-PHENYLACETATE/CREAT UR-SRTO: 12 (ref 0–25)
4OH-PHENYLLACTATE/CREAT UR-SRTO: NOT DETECTED (ref 0–4)
4OH-PHENYLPYRUVATE/CREAT UR-SRTO: NOT DETECTED (ref 0–2)
A-KETOGLUT/CREAT UR-SRTO: 13 (ref 0–75)
ACETOACET/CREAT UR-SRTO: NOT DETECTED (ref 0–4)
ADIPATE/CREAT UR-SRTO: 2 (ref 0–35)
B-OH-BUTYR/CREAT UR-SRTO: 2 (ref 0–4)
COLLECT DURATION TIME UR: 24 H
CREAT 24H UR-MCNC: 66 MG/DL
CREAT 24H UR-MRATE: 1122 MG/24 H (ref 603–1783)
CREAT UR-MCNC: 174 MG/DL
ETHYLMALONATE/CREAT UR-SRTO: 2 (ref 0–4)
FUMARATE/CREAT UR-SRTO: NOT DETECTED (ref 0–4)
LACTATE/CREAT UR-SRTO: 26 (ref 0–50)
METHYLMALONATE/CREAT UR-SRTO: NOT DETECTED (ref 0–5)
ORGANIC ACIDS PATTERN UR-IMP: NORMAL
PYRUVATE/CREAT UR-SRTO: 14 (ref 0–15)
SEBACATE/CREAT UR-SRTO: NOT DETECTED (ref 0–3)
SPECIMEN VOL 24H UR: 1700 ML
SUBERATE/CREAT UR-SRTO: 1 (ref 0–3)
SUCCINATE/CREAT UR-SRTO: 5 (ref 0–20)
SUCCINYLACETONE/CREAT UR-SRTO: NOT DETECTED (ref 0–0)

## 2025-01-16 ENCOUNTER — TELEPHONE (OUTPATIENT)
Dept: ALLERGY | Facility: CLINIC | Age: 40
End: 2025-01-16
Payer: MEDICAID

## 2025-01-16 LAB
COPPER 24H UR-MRATE: 9 UG/24 HR (ref 3–35)
COPPER UR-MCNC: 5 UG/L
COPPER/CREAT UR: 8 UG/G CREAT (ref 0–49)
CREAT UR-MCNC: 0.65 G/L (ref 0.3–3)
PBG 24H UR-MCNC: 0.6 MG/L (ref 0–2)
PBG 24H UR-MRATE: 1 MG/24 HR (ref 0–1.5)

## 2025-01-16 NOTE — TELEPHONE ENCOUNTER
Please let patient know that urine mast cell studies are normal.  Does not meet criteria for mast cell activation syndrome at this time.

## 2025-01-21 NOTE — PROGRESS NOTES
[facility] Virtual The virtual visit conducted with Audio and Video.    Verbal consent was given for the following virtual visit, patient is currently located in [place]. All issues discussed and addressed below were done so without a physical examination. If it was felt the patient needed be seen in clinic in person they were directed there.     Subjective   COVID-19 Infection Date:  12/2021  PCR confirmed (sx: (Patient-Rptd) Fever, Fatigue, Headache, Sore throat, Cough, Loss or disturbed taste, Muscle pain, Diarrhea, Sinus pressure, Shortness of breath, Runny nose, Abdominal pain, Brain fog  - no hospitalization, no treatment)    COVID-19 vaccine status: (Patient-Rptd) Pfizer, 4/16/21, 5/7/21     Occupation: unable to work due to chronic illness prior to COVID     Current Providers: PCP none, Neurology CNP Lan, GI CNP Skyler, Psychiatyr Dr. Pittman, Allergy/Immunology Dr. Valenzuela, Cardiology Dr. Shore, Psychology [facility]    Survey Scores: 10/2024  PHQ-9: 10   MICHEL-7: 7   Sleep Wellness: 4   FSS average: 6.11   Modified Ecog average: 2.5   MOCA: 17/22 (10/2024)  Overall Health: 30     39 y.o. nonbinary with a h/o COVID-19 as noted above, depression, MICHEL, PTSD, fibromyalgia, IBS, POTS, MTHFR gene mutation, hEDS, presents for follow up at the  [facility] with c/o Dizziness, palpitations, cough, post-nasal drainage, GI complaints, concerns for MCAS, increased musculoskeletal pain, poor sleep, fatigue, brain fog    Managing LC symptoms, pretty fatigued but other than that has been OK  Breathing seems OK  POTS has gotten a little bit better with increased propranolol, taking 20mg twice daily  Compression helps, seems as though water and salt help a little but but not as much as the medication  Has trouble drinking enough, always has fluids around   Has problems with dry eyes and dry mouth, concerned for Sjögren's given positive antibodies  Eyes have been bothersome because so dry  Pain has been tolerable,  still around a 6/10, mostly joint pain  Forgot about PT, her car has not been working  Recently was awarded SSI benefits, anticipates payments starting next month, plans to get car repaired  Had Ketamine infusions in the past, full-dose, very helpful with pain and PTSD, infusions helped for several months  With loratadine and pepcid twice daily her histamine response is not as much of a problem  Labs so far have been reassuring, will have another blood test during flare  Much better regarding food tolerance with increase in antihistamine/H2 blocker, has been cooking at home and increased vegetable and lean protein   Fatigued often, feels as though sleeping normal amounts but is not refreshed  Waiting for appointment for in-lab sleep study  Brain fog continues to be bothersome  Stopped taking Vitamin B6 supplement, avoiding energy drinks  Has not been coughing much anymore, post-nasal drainage is controlled with nasal spray (cromolyn sodium)  Numbness and tingling is a lot a better since stopping B6    Relevant prior healthcare visits:  -01/2025 Allergy/Immunology notes that patient does not meet criteria for MCAS at this time given normal urine studies  -01/2025 Psychology LC group  -11/2024 Psychiatry continues Pristiq for PTSD and MICHEL, continue therapy  -11/2024 GI ordered stool tests and blood work for diarrhea and abdominal pain, Ibgard and continue fiber for likely IBS  -09/2024 Neurology for POTS, resuming propranolol as well tolerated previously  -08/2024 PT aquatic for weakness, fibromyalgia, and POTS  -05/2024 PCP recommended nutrition supplements, follow up with allergy/immunology, restart Elavil and Pristiq  -01/2024 Cardiology recommends conservative measures for ? POTS, referred to autonomic neurology    Relevant prior diagnostic studies:  -12/2024 PFTs normal  -12/2021 autonomic testing consistent with POTS    Relevant prior laboratory values, unremarkable unless noted:  -01/2025 Tryptase, CMP,  Anti-SSB, Anti-SSA, vitamin B12, UPEP, ALA, prophobilinogen, organic acids, metanephrines, urine copper, prostaglandin, n-methylhistamine  11/2024 early Sjögren's positive, VGCC, K/L FLC, vitamin E, Vitamin B6 192, aldolase, MMA, CBC/D, Celiac panel, ceruloplasmin, coQ10, copper, homocysteine elevated, Mycoplasma pneumoniae IgM and IgG, anti-parietal cell ab, sPEP, RF, ESR 24, T3, T4, TSH, Vitamin B12, BECKI, WAYNE, Anti-thyroglobulin ab, catecholamines, carnitine, BMP, TPO ab, Dys2, CRP, tryptase  -07/2024 IgE to oat, strawberry, tomato, wheat, tryptase,   -03/2024 CRP 2.6, homocysteine elevated, ferritin 38, Vitamin B12 462, TSH, T3, Mag 1.53, Lipids, CMP, CBC/D with WBC 12, vitamin D 26  -02/2024 Syphilis, HIV, Hepatitis    Exercise routine: none  Diet:  Weight hx: pre-COVID-19 165 lbs -> post-COVID-19 140 lbs  Substance use: former tobacco use, 0 servings ETOH   Social:       Current Outpatient Medications:     acyclovir (Zovirax) 400 mg tablet, Take 1 tablet (400 mg) by mouth 2 times a day., Disp: 90 tablet, Rfl: 1    blood pressure test kit-medium kit, 1 kit if needed (POTS)., Disp: 1 kit, Rfl: 0    desvenlafaxine (Pristiq) 50 mg 24 hr tablet, Take 1 tablet (50 mg) by mouth once daily., Disp: 90 tablet, Rfl: 1    folic acid (Folvite) 400 mcg tablet, Take by mouth once daily., Disp: , Rfl:     loratadine (Claritin Reditabs) 10 mg disintegrating tablet, Take 1 tablet (10 mg) by mouth 2 times a day., Disp: 180 tablet, Rfl: 0    norethindrone (Micronor) 0.35 mg tablet, Take 1 tablet (0.35 mg) by mouth once daily., Disp: 90 tablet, Rfl: 4    wheat dextrin (Benefiber Healthy Shape) 5 gram/7.4 gram powder, Take 1 teaspoon daily, Disp: 248 g, Rfl: 1    famotidine (Pepcid) 20 mg tablet, Take 1 tablet (20 mg) by mouth 2 times a day., Disp: 180 tablet, Rfl: 0    propranolol (Inderal) 10 mg tablet, Take 2 tablets (20 mg) by mouth 2 times a day., Disp: 120 tablet, Rfl: 2    Past Medical History:   Diagnosis Date     Abnormal Pap smear of cervix 2006    Allergic 2005    Anemia 2005    Anxiety 1992    Asthma 2000    Depression 1990    Aspen-Danlos syndrome (HHS-HCC)     Fibromyalgia     GERD (gastroesophageal reflux disease) 2001    Herpes 2005    HPV (human papilloma virus) infection 2004    Other conditions influencing health status     Patient denies significant medical history    Scoliosis 2008       Past Surgical History:   Procedure Laterality Date    COLPOSCOPY  ?    OTHER SURGICAL HISTORY  09/11/2018    Oral Surgery Tooth Extraction [place] Tooth    WISDOM TOOTH EXTRACTION  1998       Family History   Problem Relation Name Age of Onset    Anesthesia related problems Mother Merna Garcia     Depression Mother Merna Garcia     Diabetes Mother Merna Garcia     Hyperlipidemia Mother Merna Garcia     Mental illness Mother Merna Garcia     Vision loss Mother Merna Garcia     Alcohol abuse Father Jackson Del Angel     Depression Father Jackson Del Angel     Drug abuse Father Jackson Del Angel     Heart disease Father Jackson Del Angel     Hypertension Father Jackson Del Angel     Mental illness Father Jackson Del Angel     Asthma Brother Jackson II     Mental illness Brother Jackson II     Asthma Brother Luis     Birth defects Brother Luis     Intellectual Disability Brother Luis     Alcohol abuse Maternal Grandmother Abuela     Alcohol abuse Maternal Grandfather Bernabe     Cancer Maternal Grandfather Bernabe     Alcohol abuse Paternal Grandmother Schalupe     Alcohol abuse Paternal Grandfather Johann        Objective   LMP 12/26/2024     Physical Exam    Assessment/Plan   Problem List Items Addressed This Visit             ICD-10-CM       High    Long COVID - Primary U09.9     01/2025:  Dizziness, palpitations, cough, post-nasal drainage, GI complaints, concerns for MCAS, increased musculoskeletal pain, poor sleep, fatigue, brain fog  -repeat Vitamin B6 level ordered  -continue propranolol 20mg twice daily, refill sent  -resume Physical Therapy when you have  transportation established  -referral to pain management provider Dr. Ochoa  -referral to rheumatology given concerns for Sjögren's, schedule with Dr. Marie Rojo or Dr. Belle Jordan   -continue antihistamine and H2 blocker twice daily, glad to hear that has been helping to control your GI complaints  -continue conservative measurs for POTS  -follow up with sleep study as scheduled    10/2024:  Dizziness, palpitations, cough, post-nasal drainage, GI complaints, concerns for MCAS, increased musculoskeletal pain, poor sleep, fatigue, brain fog, numbness and tingling  -dysautonomia labs to evaluate for any reversible causes of POTS, please have these done at any  lab. Some of them require 24hr urine collection and a fasting glucose tolerance test, the lab will help you get these all set up.  -continue to use the conservative measures to help with POTS, also increase propranolol to 20mg twice daily  -I will order an in-lab sleep study to further evaluate your sleep, recommendations to improve sleep are given as well  -list of PCP providers will be emailed to you to help you establish with a PCP  -for suspected MCAS, increase loratadine and famotidine to twice daily and continue to follow the low histamine diet  -referral to [facility] Psychology support group, you will be called to set up this appointment  -continue to work with PT for pain and POTS  -we can consider referral to PM&R provider Dr. Dee Dee Garsia for pain management in light of EDS, another option would be Pain Management provider Dr. Ochoa who offers low dose Ketamine infusions, the [facility] offers high-dose Ketamine infusions  -for ADHD concerns, you can consider trying a stimulant medication with your Psychiatrist  -tips given for brain fog and fatigue management, if no improvement will consider referral to virtual OT for cognitive rehab and fatigue management  -> stop B6 supplement         Relevant Orders    Vitamin  B6       Medium    POTS (postural orthostatic tachycardia syndrome) G90.A    Relevant Medications    propranolol (Inderal) 10 mg tablet     Other Visit Diagnoses         Codes    Other chronic pain     G89.29    Relevant Orders    Referral to Pain Medicine    Referral to Rheumatology

## 2025-01-21 NOTE — ASSESSMENT & PLAN NOTE
01/2025:  Dizziness, palpitations, cough, post-nasal drainage, GI complaints, concerns for MCAS, increased musculoskeletal pain, poor sleep, fatigue, brain fog  -repeat Vitamin B6 level ordered  -continue propranolol 20mg twice daily, refill sent  -resume Physical Therapy when you have transportation established  -referral to pain management provider Dr. Ochoa  -referral to rheumatology given concerns for Sjögren's, schedule with Dr. Marie Rojo or Dr. Belle Jordan   -continue antihistamine and H2 blocker twice daily, glad to hear that has been helping to control your GI complaints  -continue conservative measurs for POTS  -follow up with sleep study as scheduled    10/2024:  Dizziness, palpitations, cough, post-nasal drainage, GI complaints, concerns for MCAS, increased musculoskeletal pain, poor sleep, fatigue, brain fog, numbness and tingling  -dysautonomia labs to evaluate for any reversible causes of POTS, please have these done at any  lab. Some of them require 24hr urine collection and a fasting glucose tolerance test, the lab will help you get these all set up.  -continue to use the conservative measures to help with POTS, also increase propranolol to 20mg twice daily  -I will order an in-lab sleep study to further evaluate your sleep, recommendations to improve sleep are given as well  -list of PCP providers will be emailed to you to help you establish with a PCP  -for suspected MCAS, increase loratadine and famotidine to twice daily and continue to follow the low histamine diet  -referral to [facility] Psychology support group, you will be called to set up this appointment  -continue to work with PT for pain and POTS  -we can consider referral to PM&R provider Dr. Dee Dee Garsia for pain management in light of EDS, another option would be Pain Management provider Dr. Ochoa who offers low dose Ketamine infusions, the [facility] pain clinic offers high-dose Ketamine infusions  -for ADHD  concerns, you can consider trying a stimulant medication with your Psychiatrist  -tips given for brain fog and fatigue management, if no improvement will consider referral to virtual OT for cognitive rehab and fatigue management  -> stop B6 supplement

## 2025-01-27 ENCOUNTER — TELEMEDICINE (OUTPATIENT)
Dept: OTHER | Facility: CLINIC | Age: 40
End: 2025-01-27
Payer: MEDICAID

## 2025-01-27 DIAGNOSIS — G90.A POTS (POSTURAL ORTHOSTATIC TACHYCARDIA SYNDROME): ICD-10-CM

## 2025-01-27 DIAGNOSIS — U09.9 LONG COVID: Primary | ICD-10-CM

## 2025-01-27 DIAGNOSIS — G89.29 OTHER CHRONIC PAIN: ICD-10-CM

## 2025-01-27 PROCEDURE — 99214 OFFICE O/P EST MOD 30 MIN: CPT | Performed by: NURSE PRACTITIONER

## 2025-01-27 PROCEDURE — 1036F TOBACCO NON-USER: CPT | Performed by: NURSE PRACTITIONER

## 2025-01-27 RX ORDER — PROPRANOLOL HYDROCHLORIDE 10 MG/1
20 TABLET ORAL 2 TIMES DAILY
Qty: 120 TABLET | Refills: 2 | Status: SHIPPED | OUTPATIENT
Start: 2025-01-27 | End: 2025-04-27

## 2025-01-27 NOTE — PATIENT INSTRUCTIONS
It was my pleasure seeing you in the [facility] today.  We will focus on addressing the following symptoms discussed today: Dizziness, palpitations, cough, post-nasal drainage, GI complaints, concerns for MCAS, increased musculoskeletal pain, poor sleep, fatigue, brain fog    My recommendations are as follows:  -repeat Vitamin B6 level ordered  -continue propranolol 20mg twice daily, refill sent  -resume Physical Therapy when you have transportation established  -referral to pain management provider Dr. Ochoa  -referral to rheumatology given concerns for Sjögren's, schedule with Dr. Marie Rojo or Dr. Belle Jordan   -continue antihistamine and H2 blocker twice daily, glad to hear that has been helping to control your GI complaints  -continue conservative measurs for POTS  -follow up with sleep study as scheduled    conservative measures to help with dysautonomia symptoms:  --drinking 1 gallon of water/day (or a mix of fluids, non sugary and minimally caffeinated)   --Eating 6g of table salt (3 tsp) in addition to salt used in cooking of naturally present in food. Avoid salt tablets and monitor BP closely  --Wearing compression stockings, grade 40-50 mmHg up to the waist every day, to be removed before bedtime  --Sleeping with the head of the bed up 45 degrees, even for short naps. Avoid stacking pillow or wedges under the neck or the back. Bend the mattress in the middle and raise the head part and stack bricks underneath. Or invest  in an adjustable bed.  --Water jogging (ie. running in the shallow part of the pool, water waist or chest level), 1 hour a day or 90 minutes 3 times a week.     Tips to help improve brain fog and fatigue:  --avoid drinking Alcohol while recovering from Long COVID  --Focus on eating whole foods to help support your gut and immune system. Aim to eat 30 different plants per week (vegetables, fruits, beans, nuts, legumes, seeds, whole grains, herbs, spices). Avoid  processed foods and beverages. Eliminate added sugars, artificial sweeteners, processed oils, artificial dyes.  --ensure to practice 30 minutes of exercise 7 days per week to keep BNDGF (brain derived neurotrophic growth factor) elevated as this will help in the regeneration of neurons, you may split exercise time up into 5 minute increments if this is better tolerated.   --slowly increase your activity by no more than 10% per week, rest when you feel tired  --utilize pacing techniques to manage fatigue, schedule rest times throughout the day so you do not run out of energy, more information to be found on this here: http://www.[facility].ca/health-info-site/Documents/post_covid-19_fatigue.pdf  --use the “attention beam” strategy to help you focus on some things while ignoring others. Imagine a flashlight beam illuminating the task that you are trying to focus on while leaving everything else in the dark.  --minimize external distractions to help with attention. For example, turn off the TV, radio, music, heater, and other electrical equipment, and close the window to screen out traffic noise. Complete important or difficult tasks in a quiet room if possible. Switch off mobile phones, switch off automatic email notifications. Use ear plugs if necessary or noise-cancelling headphones. Reduce visual distractions by clearing off your work-space, sit opposite to a window. Make sure lighting in the workspace is adequate.  --minimize internal distractions to help with attention. Thoughts, feelings, and physical sensations such as hunger or pain can be distracting. When you notice your attention beam is directed toward a thought or sensation, gently direct your attention back to the central focal point. You can also practice mindfulness and/or meditation to help reduce internal distractions  --games that can be tried to help improve memory and attention are Brain HQ and N-Back games  --mindfulness and meditation can be learned  "with the smartphone apps “Unwinding Anxiety” and “Headspace”  --Review the  Health Talk on Managing Fatigue and Thinking Changes after COVID-19 here: https://www.hospitals.org/Health-Talks/articles/2022/05/managing-fatigue-and-thinking-changes-after-covid-19  --You can also find many helpful tips and tricks in this book: \"The Long COVID self-help guide, practical ways to manage symptoms\" by [facility] from [facility]  --additional apps, books, and podcasts for patients suffering from Long COVID can be found at https://www.[facility]/public-health/long-covid/long-covid-apps-books-podcasts/     To help improve sleep:  --try Melatonin children's liquid drops 1-2mg underneath your tongue 30 minutes before you go to bed  --go to bed at the same time each night and get up at the same time each morning, including the weekend  --goal of 7-9 hours of uninterrupted sleep per night  --make sure your bedroom is quiet, dark, relaxing, and at a comfortable temperature  --remove electronic devices, such as TVs, computers, and smart phones, from your bedroom  --avoid caffeine after the morning and large meals before bed  --drink plenty of water throughout the day but avoid drinking fluids two hours before bed  --expose yourself to bright light in the morning  --engage in a calming bed-time routine of warm bath/shower, gratitude journal, guided meditation, etc.  --do not lay awake in bed for more than 20 minutes, get up and engage in a soothing activity such as reading a boring book until you feel sleepy     We will send a message in APROOFED or call you with the results of your tests.  Further recommendations will follow based on testing results and your symptoms.   Please return to COVID Recovery Clinic in 3 months, call [phone] or send a message through your APROOFED emmy if needed.    If you are interested in joining a clinical trial, look into the following " resources:  https://clinicaltrials.gov/  https://trials.[facility].org/  https://[facility].org/resources/clinical-trials/

## 2025-02-02 DIAGNOSIS — R10.84 GENERALIZED ABDOMINAL PAIN: ICD-10-CM

## 2025-02-02 DIAGNOSIS — U09.9 POST-ACUTE SEQUELAE OF COVID-19 (PASC): ICD-10-CM

## 2025-02-02 DIAGNOSIS — B00.9 HERPES: ICD-10-CM

## 2025-02-03 RX ORDER — ACYCLOVIR 400 MG/1
400 TABLET ORAL 2 TIMES DAILY
Qty: 90 TABLET | Refills: 0 | Status: SHIPPED | OUTPATIENT
Start: 2025-02-03

## 2025-02-03 RX ORDER — FAMOTIDINE 20 MG/1
20 TABLET, FILM COATED ORAL 2 TIMES DAILY
Qty: 180 TABLET | Refills: 0 | Status: SHIPPED | OUTPATIENT
Start: 2025-02-03

## 2025-02-10 ENCOUNTER — SOCIAL WORK (OUTPATIENT)
Dept: BEHAVIORAL HEALTH | Facility: CLINIC | Age: 40
End: 2025-02-10
Payer: MEDICAID

## 2025-02-10 DIAGNOSIS — F41.1 GAD (GENERALIZED ANXIETY DISORDER): ICD-10-CM

## 2025-02-10 DIAGNOSIS — F43.10 PTSD (POST-TRAUMATIC STRESS DISORDER): ICD-10-CM

## 2025-02-10 DIAGNOSIS — F43.20 ADJUSTMENT DISORDER, UNSPECIFIED TYPE: Primary | ICD-10-CM

## 2025-02-10 PROCEDURE — 90853 GROUP PSYCHOTHERAPY: CPT

## 2025-02-14 NOTE — PROGRESS NOTES
[place] COVID Support and Cognitive Behavioral Therapy Group Note      Patient  Alanna Del Angel is a 39 y.o. adult, presenting for a group visit.   Chief Complaint   Patient presents with    AD (Adjustment Disorder)    Anxiety    PTSD (Post-Traumatic Stress Disorder)       An interactive audio and video telecommunication system which permits real time communications between the patient (at the originating site) and provider (at the distant site) was utilized to provide this telehealth service.   Verbal consent was requested and obtained from Alanna Del Angel on this date, 02/10/25 for a telehealth visit.     Session conducted virtually via HIPAA compliant video.  Patient was provided with informed consent.    [place] COVID Support and Cognitive behavioral therapy group. Session #4.     Session Content  Explored & discussed the stigma (external and internal) of long-haul COVID--responding to/dealing with people that don't believe/understand, where/how to find support systems, inherent difficulty of acceptance.  Group members shared their own struggles and experiences with processing through the shame and vulnerability of living with Long-haul COVID, maintaining positive self-perspective, and filtering through the emotions that accompany.       Next Session:  2/17/25

## 2025-02-14 NOTE — GROUP NOTE
Group Topic: Coping Skills   Group Date: 2/10/2025  Start Time:  5:00 PM  End Time:  6:00 PM  Facilitators: SHREYAS Garcia   Department: [facility]    Number of Participants: 3   Group Focus: coping skills and other stigma  Treatment Modality: Patient-Centered Therapy  Interventions utilized were patient education and support  Purpose: coping skills    Name: Alanna Del Angel YOB: 1985   MR: [id number]      Facilitator:   Level of Participation: active  Quality of Participation: appropriate/pleasant, attentive, engaged, motivated, and supportive  Interactions with others: appropriate and supportive  Mood/Affect: appropriate and positive  Cognition: coherent/clear and logical  Progress: Gaining insight or knowledge  Plan: continue with services

## 2025-02-17 ENCOUNTER — APPOINTMENT (OUTPATIENT)
Dept: PAIN MEDICINE | Facility: CLINIC | Age: 40
End: 2025-02-17
Payer: MEDICAID

## 2025-02-17 ENCOUNTER — SOCIAL WORK (OUTPATIENT)
Dept: BEHAVIORAL HEALTH | Facility: CLINIC | Age: 40
End: 2025-02-17
Payer: MEDICAID

## 2025-02-17 DIAGNOSIS — F41.1 GAD (GENERALIZED ANXIETY DISORDER): ICD-10-CM

## 2025-02-17 DIAGNOSIS — F43.10 PTSD (POST-TRAUMATIC STRESS DISORDER): ICD-10-CM

## 2025-02-17 DIAGNOSIS — F43.20 ADJUSTMENT DISORDER, UNSPECIFIED TYPE: Primary | ICD-10-CM

## 2025-02-17 PROCEDURE — 90853 GROUP PSYCHOTHERAPY: CPT

## 2025-02-17 NOTE — GROUP NOTE
Group Topic: Coping Skills   Group Date: 2/17/2025  Start Time:  5:00 PM  End Time:  6:00 PM  Facilitators: SHREYAS Garcia   Department: [facility]    Number of Participants: 2   Group Focus: coping skills  Treatment Modality: Patient-Centered Therapy  Interventions utilized were patient education and support  Purpose: coping skills and self-care    Name: Alanna Del Angel YOB: 1985   MR: [id number]      Facilitator:   Level of Participation: active  Quality of Participation: appropriate/pleasant, attentive, engaged, motivated, and supportive  Interactions with others: appropriate and supportive  Mood/Affect: appropriate and positive  Cognition: coherent/clear and logical  Progress: Gaining insight or knowledge  Plan: continue with services

## 2025-02-17 NOTE — PROGRESS NOTES
No chief complaint on file.    History of Present Complaint:  The patient was referred to us by Referring Provider:  Shereen Borden, APRN-CNP . this is 39 y.o.  adult  with a past history of anxiety/depression, PTSD, adjustment disorder, EDS, fibromyalgia, POTS, long COVID on medical marijuana  presenting with {kt mrdica symptoms:49605}    Pain started *** due to {pain onset:81021}  Pain is {Better or worse:15993} {DESC; BETTER/WORSE:21148}   The pain is described as {Pain description:20310} and is relieved by {pain relief:32470}      Prior Pain Therapies: {Prior pain therapy:99765}  Past surgical history:  {Past surgical history:79079}        Employment/disability/litigation: {ktlitigation:09112}  Social history: {KT social history:81173}    Diagnostic studies: {Mri/ctscan/bone scan:65071}        Kenny Each Box that Applies Female Male   FAMILY HISTORY OF SUBSTANCE ABUSE  Kenny the boxes that applies   Alcohol ?  1    ? 3   Illegal drugs ?  2 ? 3   Rx drugs ?  4 ? 4   PERSONAL HISTORY OF SUBSTNACE ABUSE   Alcohol ?  3 ?  3   Illegal drugs ?  4 ?  4    Rx drugs ?  5 ?  5   Age Between 16-45 years ?  1 ?  1   History of Preadolescent Sexual Abuse ?  3 ?  0   PSYCHOLOGIC DISEASE   ADD, OCD, bipolar, schizophrenia   ?  2 ?  2   Depression ?  1 ?  1   Scoring Totals       Scoring (Risk)  0-3 - Low  4-7 - Moderate  8 - High    Opioid Risk Assessment Score ***/26

## 2025-02-17 NOTE — PROGRESS NOTES
[facility] COVID Support and Cognitive Behavioral Therapy Group Note      Patient  Alanna Del Angel is a 39 y.o. adult, presenting for a group visit.   Chief Complaint   Patient presents with    AD (Adjustment Disorder)    Anxiety    PTSD (Post-Traumatic Stress Disorder)       An interactive audio and video telecommunication system which permits real time communications between the patient (at the originating site) and provider (at the distant site) was utilized to provide this telehealth service.   Verbal consent was requested and obtained from Alanna Del Angel on this date, 02/17/25 for a telehealth visit.     Session conducted virtually via HIPAA compliant video.  Patient was provided with informed consent.    [facility] COVID Support and Cognitive behavioral therapy group. Session #5.     Session Content  Explored self-compassion with long-haul COVID--discussed the importance of practicing self-compassion through non-judgment and acceptance of self (limiting self-comparison), self-care, and mindful awareness.  Group members shared their own struggles and experiences with practicing and maintaining self-compassion and limiting self-comparison, and filtering through the emotions that accompany.       Next Session:  2/24/2025

## 2025-02-17 NOTE — PROGRESS NOTES
History of Present Complaint:  The patient was referred to us by Referring Provider: Shereen Borden, APRN-CNP for long COVID. this is 39 y.o.  adult {Accompanied by:11740}with a past history of anxiety/depression, PTSD, adjustment disorder, EDS, fibromyalgia, POTS, long COVID on medical marijuana presenting with {kt mrdica symptoms:97776}    Pain started {pain onset:40978}  Pain is {Better or worse:43854}   Patient history significant for the following red flags: {Red flags:35622}  The pain is described as {Pain description:52732} and is relieved by {pain relief:80228}      Prior Pain Therapies: {Prior pain therapy:08067}    Past surgical history:  {Past surgical history:25582}           Procedures:   *** the patient has had a ***% improvement in pain.    Portions of record reviewed for pertinent issues: active problem list, medication list, allergies, family history, social history, notes from last encounter, encounters, lab results, imaging and other available records.    I have personally reviewed the OARRS report for this patient. This report is scanned into the electronic medical record. I have considered the risks of abuse, dependence, addiction and diversion. It showed: ***  OPIOID RISK ASSESSMENT SCORE ***/26  Opioid agreement: ***  Activities of daily living: ***  Adverse effects: ***  Analgesia: W/O ***/10, W ***/10  {***}  Toxicology screen: ***  Aberrant behavior: ***  My patient has no underlying substance abuse or alcohol abuse and there's no mental health conditions contributing to the patient's pain.        Diagnostic studies:  ***      Employment/disability/litigation: {ktlitigation:39198}    Social History:  {KT social history:07496}       Review of Systems       Physical Exam       Assessment  ***           Plan  At least 50% of the visit was involved in the discussion of the options for treatment. We discussed exercises, medication, interventional therapies and surgery. Healthy life style is  essential with patient hard work to achieve the wellness. In addition; discussion with the patient and/or family about any of the diagnostic results, impressions and/or recommended diagnostic studies, prognosis, risks and benefits of treatment options, instructions for treatment and/or follow-up, importance of compliance with chosen treatment options, risk-factor reduction, and patient/family education.           Recommended Pool therapy, walking in the pool, at least 3x per week for 30 minutes  Recommend self-directed physical therapy with at least daily exercises for minimum of 20-minute, brochure was handed to the patient  *** Smoking cessation  Healthy lifestyle and anti-inflammatory diet in addition to weight control discussed with the patient  Alternative chronic pain therapies was discussed, encouraged and information was handed  Return to Clinic 3 months       *Please note this report has been produced using speech recognition software and may contain errors related to that system including grammar, punctuation and spelling as well as words and phrases that may be inappropriate. If there are questions or concerns, please feel free to contact me to clarify.    Ambrose Ochoa MD

## 2025-02-18 ENCOUNTER — APPOINTMENT (OUTPATIENT)
Dept: BEHAVIORAL HEALTH | Facility: CLINIC | Age: 40
End: 2025-02-18
Payer: MEDICAID

## 2025-02-18 NOTE — PROGRESS NOTES
History of Present Complaint:  The patient was referred to us by Referring Provider: Shereen Borden, APRN-CNP long COVID. this is 39 y.o.  adult {Accompanied by:23939}with a past history of anxiety/depression, adjustment disorder, PTSD, ADS, POTS, fibromyalgia on medical marijuana presenting with {kt mrdica symptoms:52618}    Pain started {pain onset:22294}  Pain is {Better or worse:82957}   Patient history significant for the following red flags: {Red flags:49791}  The pain is described as {Pain description:40352} and is relieved by {pain relief:23266}      Prior Pain Therapies: {Prior pain therapy:82068}    Past surgical history:  {Past surgical history:03930}           Procedures:   *** the patient has had a ***% improvement in pain.    Portions of record reviewed for pertinent issues: active problem list, medication list, allergies, family history, social history, notes from last encounter, encounters, lab results, imaging and other available records.    I have personally reviewed the OARRS report for this patient. This report is scanned into the electronic medical record. I have considered the risks of abuse, dependence, addiction and diversion. It showed: ***  OPIOID RISK ASSESSMENT SCORE ***/26  Opioid agreement: ***  Activities of daily living: ***  Adverse effects: ***  Analgesia: W/O ***/10, W ***/10  {***}  Toxicology screen: ***  Aberrant behavior: ***  My patient has no underlying substance abuse or alcohol abuse and there's no mental health conditions contributing to the patient's pain.        Diagnostic studies:  ***      Employment/disability/litigation: {ktlitigation:14083}    Social History:  {KT social history:17150}       Review of Systems       Physical Exam       Assessment  ***           Plan  At least 50% of the visit was involved in the discussion of the options for treatment. We discussed exercises, medication, interventional therapies and surgery. Healthy life style is essential with  patient hard work to achieve the wellness. In addition; discussion with the patient and/or family about any of the diagnostic results, impressions and/or recommended diagnostic studies, prognosis, risks and benefits of treatment options, instructions for treatment and/or follow-up, importance of compliance with chosen treatment options, risk-factor reduction, and patient/family education.           Recommended Pool therapy, walking in the pool, at least 3x per week for 30 minutes  Recommend self-directed physical therapy with at least daily exercises for minimum of 20-minute, brochure was handed to the patient  *** Smoking cessation  Healthy lifestyle and anti-inflammatory diet in addition to weight control discussed with the patient  Alternative chronic pain therapies was discussed, encouraged and information was handed  Return to Clinic 3 months       *Please note this report has been produced using speech recognition software and may contain errors related to that system including grammar, punctuation and spelling as well as words and phrases that may be inappropriate. If there are questions or concerns, please feel free to contact me to clarify.    Ambrose Ochoa MD

## 2025-02-20 ENCOUNTER — APPOINTMENT (OUTPATIENT)
Dept: PAIN MEDICINE | Facility: CLINIC | Age: 40
End: 2025-02-20
Payer: MEDICAID

## 2025-02-20 NOTE — PROGRESS NOTES
No chief complaint on file.    History of Present Complaint:  The patient was referred to us by Referring Provider:  Shereen Borden, APRN-CNP . this is 39 y.o.  adult  with a past history of anxiety/depression, PTSD, adjustment disorder, EDS, fibromyalgia, POTS, long COVID on medical marijuana  presenting with {kt mrdica symptoms:04862}    Pain started *** due to {pain onset:13928}  Pain is {Better or worse:95438} {DESC; BETTER/WORSE:22440}   The pain is described as {Pain description:01711} and is relieved by {pain relief:34670}      Prior Pain Therapies: {Prior pain therapy:72888}  Past surgical history:  {Past surgical history:69688}        Employment/disability/litigation: {ktlitigation:97750}  Social history: {KT social history:46646}    Diagnostic studies: {Mri/ctscan/bone scan:88196}        Kenny Each Box that Applies Female Male   FAMILY HISTORY OF SUBSTANCE ABUSE  Kenny the boxes that applies   Alcohol ?  1    ? 3   Illegal drugs ?  2 ? 3   Rx drugs ?  4 ? 4   PERSONAL HISTORY OF SUBSTNACE ABUSE   Alcohol ?  3 ?  3   Illegal drugs ?  4 ?  4    Rx drugs ?  5 ?  5   Age Between 16-45 years ?  1 ?  1   History of Preadolescent Sexual Abuse ?  3 ?  0   PSYCHOLOGIC DISEASE   ADD, OCD, bipolar, schizophrenia   ?  2 ?  2   Depression ?  1 ?  1   Scoring Totals       Scoring (Risk)  0-3 - Low  4-7 - Moderate  8 - High    Opioid Risk Assessment Score ***/26

## 2025-02-24 ENCOUNTER — APPOINTMENT (OUTPATIENT)
Dept: BEHAVIORAL HEALTH | Facility: CLINIC | Age: 40
End: 2025-02-24
Payer: MEDICAID

## 2025-02-24 ENCOUNTER — APPOINTMENT (OUTPATIENT)
Dept: PAIN MEDICINE | Facility: CLINIC | Age: 40
End: 2025-02-24
Payer: MEDICAID

## 2025-02-24 DIAGNOSIS — F43.10 PTSD (POST-TRAUMATIC STRESS DISORDER): ICD-10-CM

## 2025-02-24 DIAGNOSIS — F41.1 GAD (GENERALIZED ANXIETY DISORDER): ICD-10-CM

## 2025-02-24 DIAGNOSIS — F43.20 ADJUSTMENT DISORDER, UNSPECIFIED TYPE: Primary | ICD-10-CM

## 2025-02-24 PROCEDURE — 90853 GROUP PSYCHOTHERAPY: CPT

## 2025-02-24 NOTE — PROGRESS NOTES
[facility]-Ha[facility] COVID Support and Cognitive Behavioral Therapy Group Note      Patient  Alanna Del Angel is a 39 y.o. adult, presenting for a group visit.   Chief Complaint   Patient presents with    AD (Adjustment Disorder)    Anxiety    PTSD (Post-Traumatic Stress Disorder)       An interactive audio and video telecommunication system which permits real time communications between the patient (at the originating site) and provider (at the distant site) was utilized to provide this telehealth service.   Verbal consent was requested and obtained from Alanna Del Angel on this date, 02/24/25 for a telehealth visit.     Session conducted virtually via HIPAA compliant video.  Patient was provided with informed consent.    [facility] [place] COVID Support and Cognitive behavioral therapy group. Session #6.     Session Content  Provided information and discussed definitions and characteristics of depression and anxiety and the cycles of depression and anxiety, with attention to the similarities between depression/anxiety symptoms and Long COVID symptoms.  Introduced coping/behavioral strategies--mindfulness exercises, challenging thoughts, deep breathing, muscle relaxation, and imagery, and discussed behavioral activation and opposite action as a potential way to determine COVID fatigue limitations and combat what may seem like a “lack of motivation.”     Group members shared their experiences and challenges navigating their own Long-Haul COVID symptoms.    Next Session: Discharged     There is no height or weight on file to calculate BMI. Morbid obesity complicates all aspects of disease management from diagnostic modalities to treatment. Weight loss encouraged and health benefits explained to patient.

## 2025-02-24 NOTE — GROUP NOTE
Group Topic: Coping Skills   Group Date: 2/24/2025  Start Time:  5:00 PM  End Time:  6:00 PM  Facilitators: SHREYAS Garcia   Department: [facility]    Number of Participants: 4   Group Focus: anxiety, coping skills, and depression  Treatment Modality: Patient-Centered Therapy  Interventions utilized were patient education and support  Purpose: coping skills    Name: Alanna Del Angel YOB: 1985   MR: [id number]      Facilitator:   Level of Participation: active  Quality of Participation: appropriate/pleasant, attentive, engaged, motivated, and supportive  Interactions with others: appropriate and supportive  Mood/Affect: appropriate and positive  Cognition: coherent/clear and logical  Progress: Significant  Plan: Patient has completed the [facility] Support/Therapy Group and is discharged from group.  Patient will be encouraged to attend ongoing [facility] Aftercare Group.

## 2025-02-25 ENCOUNTER — APPOINTMENT (OUTPATIENT)
Dept: PAIN MEDICINE | Facility: CLINIC | Age: 40
End: 2025-02-25
Payer: MEDICAID

## 2025-03-31 ENCOUNTER — APPOINTMENT (OUTPATIENT)
Dept: PRIMARY CARE | Facility: CLINIC | Age: 40
End: 2025-03-31
Payer: MEDICAID

## 2025-04-01 DIAGNOSIS — Z30.9 ENCOUNTER FOR CONTRACEPTIVE MANAGEMENT, UNSPECIFIED TYPE: ICD-10-CM

## 2025-04-01 RX ORDER — NORETHINDRONE 0.35 MG/1
1 TABLET ORAL DAILY
Qty: 84 TABLET | Refills: 0 | Status: SHIPPED | OUTPATIENT
Start: 2025-04-01

## 2025-04-02 DIAGNOSIS — B00.9 HERPES: ICD-10-CM

## 2025-04-02 RX ORDER — ACYCLOVIR 400 MG/1
TABLET ORAL
Qty: 60 TABLET | Refills: 0 | Status: SHIPPED | OUTPATIENT
Start: 2025-04-02

## 2025-04-12 ENCOUNTER — PROCEDURE VISIT (OUTPATIENT)
Dept: SLEEP MEDICINE | Facility: CLINIC | Age: 40
End: 2025-04-12
Payer: MEDICAID

## 2025-04-12 VITALS
BODY MASS INDEX: 27.07 KG/M2 | RESPIRATION RATE: 26 BRPM | SYSTOLIC BLOOD PRESSURE: 107 MMHG | DIASTOLIC BLOOD PRESSURE: 73 MMHG | OXYGEN SATURATION: 98 % | HEIGHT: 63 IN | HEART RATE: 86 BPM | WEIGHT: 152.78 LBS

## 2025-04-12 DIAGNOSIS — G90.A POTS (POSTURAL ORTHOSTATIC TACHYCARDIA SYNDROME): ICD-10-CM

## 2025-04-12 DIAGNOSIS — R53.82 CHRONIC FATIGUE: ICD-10-CM

## 2025-04-12 DIAGNOSIS — G47.30 SLEEP APNEA, UNSPECIFIED: ICD-10-CM

## 2025-04-12 DIAGNOSIS — U09.9 POST-ACUTE SEQUELAE OF COVID-19 (PASC): ICD-10-CM

## 2025-04-12 DIAGNOSIS — G47.61 PERIODIC LIMB MOVEMENT DISORDER: ICD-10-CM

## 2025-04-12 PROCEDURE — 95810 POLYSOM 6/> YRS 4/> PARAM: CPT | Performed by: INTERNAL MEDICINE

## 2025-04-13 NOTE — PROGRESS NOTES
[facility] TECH NOTE:     Patient: Alanna Del Angel   MRN//AGE: [id number]  1985  39 y.o.   Technologist: OBINNA DENNIS   Room: 440A   Service Date: 2025        [facility]     TECHNOLOGIST SLEEP STUDY PROCEDURE NOTE:   This sleep study is being conducted according to the policies and procedures outlined by the AAS accreditation standards.  The sleep study procedure and processes involved during this appointment was explained to the patient/patient’s family, questions were answered. The patient/family verbalized understanding.      The patient is a 39 y.o. year old adult scheduled for a Diagnostic PSG  with montage of:    PSG Master shared . Alanna arrived for Alanna's appointment.      The study that was ultimately completed was a Diagnostic PSG  with montage of:   PSG Master shared .    The full study Was completed.  Patient questionnaires completed?: yes     Consents signed? yes    Initial Fall Risk Screening:     Alanna has not fallen in the last 6 months. Annettecoathaven's did not result in injury. Alanna does not have a fear of falling. X does not need assistance with sitting, standing, or walking. Alanna does not need assistance walking in Alanna's home. Alanna does not need assistance in an unfamiliar setting. The patient is notusing an assistive device.     Brief Study observations: Patient here for study regarding inability to remain sleep. Alanna has frequent awakenings that can sometimes be prolonged. Patient does take meds at 0200 which could be throwing off sleep.     Deviation to order/protocol and reason: N/A      If PAP, which was preferred mask/pressure/mode: N/A      Other:None    After the procedure, the patient/family was informed to ensure followup with ordering clinician for testing results.      Technologist: OBINNA DENNIS

## 2025-04-14 ENCOUNTER — TELEMEDICINE (OUTPATIENT)
Dept: PRIMARY CARE | Facility: CLINIC | Age: 40
End: 2025-04-14
Payer: MEDICAID

## 2025-04-14 DIAGNOSIS — R68.2 DRY MOUTH: ICD-10-CM

## 2025-04-14 DIAGNOSIS — F43.10 PTSD (POST-TRAUMATIC STRESS DISORDER): ICD-10-CM

## 2025-04-14 DIAGNOSIS — G90.A POTS (POSTURAL ORTHOSTATIC TACHYCARDIA SYNDROME): ICD-10-CM

## 2025-04-14 DIAGNOSIS — M79.7 FIBROMYALGIA: ICD-10-CM

## 2025-04-14 DIAGNOSIS — H04.123 DRY EYES: Primary | ICD-10-CM

## 2025-04-14 PROCEDURE — 99213 OFFICE O/P EST LOW 20 MIN: CPT | Performed by: FAMILY MEDICINE

## 2025-04-14 NOTE — PROGRESS NOTES
Subjective   Patient ID: Alanna Del Angel is a 39 y.o. who presents via virtual visit for follow-up  HPI    Virtual or Telephone Consent    An interactive audio and video telecommunication system which permits real time communications between the patient (at the originating site) and provider (at the distant site) was utilized to provide this telehealth service.   Verbal consent was requested and obtained from Alanna Del Angel on this date, 04/14/25 for a telehealth visit and the patient's location was confirmed at the time of the visit.    Alanna (they/them) is a 40 yo seen via virtual visit for follow-up    EDS  Fibromyalgia   Sjogrens  - Per last visit, had dry eyes, dry mouth, needing to drink water constantly  - Had early sjogrens testing which resulted positive. Follow-up testing with [facility] and [facility] were negative. Was referred to rheumatology. Has appointment next month  - Symptoms unchanged relatively. Has plans to see pain management and PT    POTS  - Diagnosed via tilt table. Managed with propranolol. Symptoms improved.     History of B12 deficiency  - Recent labs reassuring while off supplement     PTSD  - Doing trauma therapy  - On Prestiq  - Symptoms well managed    Objective     There were no vitals taken for this visit.    Gen: Well appearing, no acute distress  HEENT: Mucous membranes moist  Pulm: Respirations nonlabored  Psych: Normal mood and affect    Assessment/Plan   40 yo seen today via virtual visit     Possible Sjogrens  - Follow-up rheumatology     Chronic pain 2/2 EDS, Fibromyalgia, and possible Sjogrens  - Follow-up pain management, PT    POTS  - Continue current management    B12 deficiency  - Currently off supplementation. Last labs stable    PTSD  - Continue to follow with therapy and psychiatry    Follow-up for in person annual physical exam in about 4 months, or sooner as needed

## 2025-04-16 SDOH — ECONOMIC STABILITY: INCOME INSECURITY: IN THE LAST 12 MONTHS, WAS THERE A TIME WHEN YOU WERE NOT ABLE TO PAY THE MORTGAGE OR RENT ON TIME?: NO

## 2025-04-16 SDOH — ECONOMIC STABILITY: FOOD INSECURITY: WITHIN THE PAST 12 MONTHS, THE FOOD YOU BOUGHT JUST DIDN'T LAST AND YOU DIDN'T HAVE MONEY TO GET MORE.: SOMETIMES TRUE

## 2025-04-16 SDOH — ECONOMIC STABILITY: INCOME INSECURITY: HOW HARD IS IT FOR YOU TO PAY FOR THE VERY BASICS LIKE FOOD, HOUSING, MEDICAL CARE, AND HEATING?: SOMEWHAT HARD

## 2025-04-16 SDOH — ECONOMIC STABILITY: FOOD INSECURITY: WITHIN THE PAST 12 MONTHS, YOU WORRIED THAT YOUR FOOD WOULD RUN OUT BEFORE YOU GOT MONEY TO BUY MORE.: OFTEN TRUE

## 2025-04-16 ASSESSMENT — SOCIAL DETERMINANTS OF HEALTH (SDOH)
WITHIN THE LAST YEAR, HAVE YOU BEEN HUMILIATED OR EMOTIONALLY ABUSED IN OTHER WAYS BY YOUR PARTNER OR EX-PARTNER?: NO
WITHIN THE LAST YEAR, HAVE YOU BEEN AFRAID OF YOUR PARTNER OR EX-PARTNER?: NO
WITHIN THE LAST YEAR, HAVE TO BEEN RAPED OR FORCED TO HAVE ANY KIND OF SEXUAL ACTIVITY BY YOUR PARTNER OR EX-PARTNER?: NO
WITHIN THE LAST YEAR, HAVE YOU BEEN KICKED, HIT, SLAPPED, OR OTHERWISE PHYSICALLY HURT BY YOUR PARTNER OR EX-PARTNER?: NO

## 2025-04-16 ASSESSMENT — ANXIETY QUESTIONNAIRES
1. FEELING NERVOUS, ANXIOUS, OR ON EDGE: SEVERAL DAYS
3. WORRYING TOO MUCH ABOUT DIFFERENT THINGS: SEVERAL DAYS
7. FEELING AFRAID AS IF SOMETHING AWFUL MIGHT HAPPEN: MORE THAN HALF THE DAYS
5. BEING SO RESTLESS THAT IT IS HARD TO SIT STILL: SEVERAL DAYS
2. NOT BEING ABLE TO STOP OR CONTROL WORRYING: SEVERAL DAYS
IF YOU CHECKED OFF ANY PROBLEMS ON THIS QUESTIONNAIRE, HOW DIFFICULT HAVE THESE PROBLEMS MADE IT FOR YOU TO DO YOUR WORK, TAKE CARE OF THINGS AT HOME, OR GET ALONG WITH OTHER PEOPLE: SOMEWHAT DIFFICULT
7. FEELING AFRAID AS IF SOMETHING AWFUL MIGHT HAPPEN: MORE THAN HALF THE DAYS
3. WORRYING TOO MUCH ABOUT DIFFERENT THINGS: SEVERAL DAYS
GAD7 TOTAL SCORE: 8
IF YOU CHECKED OFF ANY PROBLEMS ON THIS QUESTIONNAIRE, HOW DIFFICULT HAVE THESE PROBLEMS MADE IT FOR YOU TO DO YOUR WORK, TAKE CARE OF THINGS AT HOME, OR GET ALONG WITH OTHER PEOPLE: SOMEWHAT DIFFICULT
1. FEELING NERVOUS, ANXIOUS, OR ON EDGE: SEVERAL DAYS
4. TROUBLE RELAXING: MORE THAN HALF THE DAYS
2. NOT BEING ABLE TO STOP OR CONTROL WORRYING: SEVERAL DAYS
5. BEING SO RESTLESS THAT IT IS HARD TO SIT STILL: SEVERAL DAYS
4. TROUBLE RELAXING: MORE THAN HALF THE DAYS
6. BECOMING EASILY ANNOYED OR IRRITABLE: NOT AT ALL
6. BECOMING EASILY ANNOYED OR IRRITABLE: NOT AT ALL

## 2025-04-16 ASSESSMENT — LIFESTYLE VARIABLES
USE_PILLS_ALCOHOL_TO_HELP_SLEEP: NOT ANYMORE
AUDIT-C TOTAL SCORE: 0
SKIP TO QUESTIONS 9-10: 1
DO_YOU_DRINK?: I DID NOT DO THIS BEFORE COVID-19
HOW MANY STANDARD DRINKS CONTAINING ALCOHOL DO YOU HAVE ON A TYPICAL DAY: PATIENT DOES NOT DRINK
USE_ALCOHOL_TO_HELP_SLEEP: YES
HOW OFTEN DO YOU HAVE SIX OR MORE DRINKS ON ONE OCCASION: NEVER
HOW OFTEN DO YOU HAVE A DRINK CONTAINING ALCOHOL: NEVER

## 2025-04-16 ASSESSMENT — SLEEP AND FATIGUE QUESTIONNAIRES
FATIGUE_MOST_DISABILING_SYMPTOM: 7 STRONGLY AGREE
FATIGUE_INTERFERES_RESPONSIBILITIES: 7 STRONGLY AGREE
FATIGUE_INTERFERES_PHYSICAL_FUNCTIONING: 7 STRONGLY AGREE
MY FATIGUE PREVENTS SUSTAINED PHYSICAL FUNCTIONING.: 5
FATIGUE_CAUSES_FREQUENT_PROBLEMTS: 6
VISUAL ANALOGUE FATIGUE SCALE (VAFS): 3
FATIGUE_INTERFERES_PHYSICAL_FUNCTIONING: 7 STRONGLY AGREE
FATIGUE_MOST_DISABILING_SYMPTOM: 7 STRONGLY AGREE
EXERCISE_BRINGS_ON_FATIGUE: 3
EASILY_FATIGUED: 5
AVERAGE_FSS_SCORE: 6
MY MOTIVATION IS LOWER WHEN I AM FATIGUED.: 7 STRONGLY AGREE
FATIGUE_INTERFERES_SOCIAL_LIFE: 7 STRONGLY AGREE
FATIGUE_CAUSES_FREQUENT_PROBLEMTS: 6
EASILY_FATIGUED: 5
EXERCISE_BRINGS_ON_FATIGUE: 3
FATIGUE_INTERFERES_SOCIAL_LIFE: 7 STRONGLY AGREE
FATIGUE_INTERFERES_RESPONSIBILITIES: 7 STRONGLY AGREE
MY FATIGUE PREVENTS SUSTAINED PHYSICAL FUNCTIONING.: 5
MY MOTIVATION IS LOWER WHEN I AM FATIGUED.: 7 STRONGLY AGREE

## 2025-04-21 ENCOUNTER — APPOINTMENT (OUTPATIENT)
Dept: OBSTETRICS AND GYNECOLOGY | Facility: CLINIC | Age: 40
End: 2025-04-21
Payer: MEDICAID

## 2025-04-21 VITALS
HEIGHT: 63 IN | WEIGHT: 149 LBS | SYSTOLIC BLOOD PRESSURE: 116 MMHG | BODY MASS INDEX: 26.4 KG/M2 | DIASTOLIC BLOOD PRESSURE: 70 MMHG

## 2025-04-21 DIAGNOSIS — B00.9 HERPES: ICD-10-CM

## 2025-04-21 DIAGNOSIS — A60.04 HERPES SIMPLEX VULVOVAGINITIS: Primary | ICD-10-CM

## 2025-04-21 DIAGNOSIS — Z30.09 STERILIZATION CONSULT: ICD-10-CM

## 2025-04-21 DIAGNOSIS — Z12.31 ENCOUNTER FOR SCREENING MAMMOGRAM FOR MALIGNANT NEOPLASM OF BREAST: ICD-10-CM

## 2025-04-21 PROCEDURE — 99214 OFFICE O/P EST MOD 30 MIN: CPT | Performed by: STUDENT IN AN ORGANIZED HEALTH CARE EDUCATION/TRAINING PROGRAM

## 2025-04-21 PROCEDURE — 99459 PELVIC EXAMINATION: CPT | Performed by: STUDENT IN AN ORGANIZED HEALTH CARE EDUCATION/TRAINING PROGRAM

## 2025-04-21 PROCEDURE — 3008F BODY MASS INDEX DOCD: CPT | Performed by: STUDENT IN AN ORGANIZED HEALTH CARE EDUCATION/TRAINING PROGRAM

## 2025-04-21 PROCEDURE — 99395 PREV VISIT EST AGE 18-39: CPT | Performed by: STUDENT IN AN ORGANIZED HEALTH CARE EDUCATION/TRAINING PROGRAM

## 2025-04-21 PROCEDURE — 1036F TOBACCO NON-USER: CPT | Performed by: STUDENT IN AN ORGANIZED HEALTH CARE EDUCATION/TRAINING PROGRAM

## 2025-04-21 RX ORDER — VALACYCLOVIR HYDROCHLORIDE 500 MG/1
500 TABLET, FILM COATED ORAL DAILY
Qty: 90 TABLET | Refills: 3 | Status: SHIPPED | OUTPATIENT
Start: 2025-04-21 | End: 2026-04-21

## 2025-04-21 RX ORDER — CELECOXIB 400 MG/1
400 CAPSULE ORAL ONCE
OUTPATIENT
Start: 2025-04-21 | End: 2025-04-21

## 2025-04-21 RX ORDER — ACETAMINOPHEN 325 MG/1
975 TABLET ORAL ONCE
OUTPATIENT
Start: 2025-04-21 | End: 2025-04-21

## 2025-04-21 RX ORDER — GABAPENTIN 600 MG/1
600 TABLET ORAL ONCE
OUTPATIENT
Start: 2025-04-21 | End: 2025-04-21

## 2025-04-21 ASSESSMENT — PAIN SCALES - GENERAL: PAINLEVEL_OUTOF10: 0-NO PAIN

## 2025-04-21 NOTE — ASSESSMENT & PLAN NOTE
Reviewed various forms of contraception including pills, long acting reversible methods  They affirm their desire for permanent sterilization    The risks, benefits, and alternatives to the procedure were discussed.   The common risks of bleeding, infection, injury to bowel, bladder, ureter, urethra, vagina, recurrence of prolapse, and incomplete bladder emptying were reviewed.  I explained that while we pay close attention to positioning prior to starting the procedure the positioning may result in hip or lower extremity discomfort and in rare cases lower extremity nerve injury. All of these risks were reviewed and questions were answered.   Guerohaven JIMENEZ Mer confirms understanding of these risks and affirms their desire to proceed with the surgery.     Preoperative Planning:   - Proceed with LSC Tubal sterilization on TBD at [facility].   - Surgical consent forms were signed 4/21/2025.   - Federal consents 4/21/2025.  - Pre-anesthesia visit not indicated.  - Antibiotic prophylaxis: Preperation with Chlorhexidine prep.    - VTE Prophylaxis: SCDs. Heparin not indicated.    Surgical considerations:  BMI:  Body mass index is 26.07 kg/m².   Home medications reviewed:  yes- continue all DOS   Anticoagulated:  no  Diabetic:  no  Anemia:  no  Cardiac disease/EKG/Echo: none  Pulmomary disease/CXR/CPAP:  none  Regular narcotic use: none   TAP candidate: no  Chronic kidney disease: no  Smoker:  no    Substance use:  no  Immunosuppression:   no  Mobility/orthopedic limitations:  no  Chronic steroid use:  no  Accepts blood products:  yes

## 2025-04-21 NOTE — PROGRESS NOTES
"Alanna Del Angel is a 39 y.o.  adult who is here for a routine exam.   PCP = Keren Butts MD  Prefers they/them pronouns    Subjective     Concerns today:     Want to discuss tubal ligation- currently have medicare. Desires permanent sterilization.    OB/Gyn History:  Menstrual cycle concerns: none  Sexual Health Concerns: HSV contracted from [facility]- request med refill, currently on acyclovirt BID  Current contraception: as above; currently on POPs    Preventative:  Mammogram: due age 40   Last Colonoscopy: age 45   DM Screening: n/a  HPV vaccination: not received, desires  Exercise: swim frequently  Diet: no red meat.   Seat Belt Use: always    Social History:  Living Situation: live with best friend &   School/Employment: not currently  Safe at Home?: Yes  Depression Screen/Mood concerns: No    Substance use updated in chart.  Family history (specifically breast, ovarian, colon cancer) reviewed and updated in chart.   Personal medical and surgical history reviewed and updated in chart.   Obstetric & Gynecologic history reviewed and updated in chart.  Pap history reviewed and updated in chart.         Objective   /70   Ht 1.61 m (5' 3.39\")   Wt 67.6 kg (149 lb)   LMP 2025   BMI 26.07 kg/m²   Physical Exam  Vitals reviewed. Exam conducted with a chaperone present.   Constitutional:       Appearance: Normal appearance.   HENT:      Head: Normocephalic.   Cardiovascular:      Rate and Rhythm: Normal rate and regular rhythm.   Pulmonary:      Effort: Pulmonary effort is normal. No respiratory distress.   Chest:   Breasts:     Right: Normal. No swelling, mass or skin change.      Left: Normal. No swelling, mass or skin change.   Abdominal:      General: There is no distension.      Palpations: Abdomen is soft. There is no mass.      Tenderness: There is no abdominal tenderness. There is no guarding or rebound.   Genitourinary:     Comments: Normal appearing external female genitalia. " Vulva without lesions.    Lymphadenopathy:      Upper Body:      Right upper body: No supraclavicular, axillary or pectoral adenopathy.      Left upper body: No supraclavicular, axillary or pectoral adenopathy.   Skin:     General: Skin is warm and dry.   Neurological:      General: No focal deficit present.      Mental Status: Alanna is alert.   Psychiatric:         Mood and Affect: Mood normal.         Behavior: Behavior normal.         Thought Content: Thought content normal.         Judgment: Judgment normal.             Assessment/Plan      Alanna Del Angel is a 39 y.o.  adult presenting today for annual exam with the following problems addressed:    Problem List Items Addressed This Visit       Herpes    Previously on acyclovir BID- counsled on acyclovir vs. Valacyclovir. Desires once daily dosing  Valtrex Rx sent         Genital herpes - Primary    Relevant Medications    valACYclovir (Valtrex) 500 mg tablet    Sterilization consult    Reviewed various forms of contraception including pills, long acting reversible methods  They affirm their desire for permanent sterilization    The risks, benefits, and alternatives to the procedure were discussed.   The common risks of bleeding, infection, injury to bowel, bladder, ureter, urethra, vagina, recurrence of prolapse, and incomplete bladder emptying were reviewed.  I explained that while we pay close attention to positioning prior to starting the procedure the positioning may result in hip or lower extremity discomfort and in rare cases lower extremity nerve injury. All of these risks were reviewed and questions were answered.   Alanna Del Angel confirms understanding of these risks and affirms their desire to proceed with the surgery.     Preoperative Planning:   - Proceed with LSC Tubal sterilization on TBD at [facility].   - Surgical consent forms were signed 2025.   - Federal consents 2025.  - Pre-anesthesia visit not indicated.  - Antibiotic  prophylaxis: Preperation with Chlorhexidine prep.    - VTE Prophylaxis: SCDs. Heparin not indicated.    Surgical considerations:  BMI:  Body mass index is 26.07 kg/m².   Home medications reviewed:  yes- continue all DOS   Anticoagulated:  no  Diabetic:  no  Anemia:  no  Cardiac disease/EKG/Echo: none  Pulmomary disease/CXR/CPAP:  none  Regular narcotic use: none   TAP candidate: no  Chronic kidney disease: no  Smoker:  no    Substance use:  no  Immunosuppression:   no  Mobility/orthopedic limitations:  no  Chronic steroid use:  no  Accepts blood products:  yes           Relevant Orders    Case Request Operating Room: SALPINGECTOMY, LAPAROSCOPIC     Other Visit Diagnoses         Encounter for screening mammogram for malignant neoplasm of breast        Relevant Orders    BI mammo bilateral screening tomosynthesis            Healthcare Maintenance:  - Reviewed healthy lifestyle including well rounded diet and exercise (moderate intensity 150min/week; high intensity 75min/week)  - Immunizations: Counseled on HPV  - Pap UTD  - Mammogram ordered  - STI testing declined

## 2025-04-21 NOTE — ASSESSMENT & PLAN NOTE
Previously on acyclovir BID- counsled on acyclovir vs. Valacyclovir. Desires once daily dosing  Valtrex Rx sent

## 2025-04-29 NOTE — PROGRESS NOTES
JOSE LUIS Virtual The virtual visit conducted with Audio and Video.    Verbal consent was given for the following virtual visit, patient is currently located in [place]. All issues discussed and addressed below were done so without a physical examination. If it was felt the patient needed be seen in clinic in person they were directed there.     Subjective   COVID-19 Infection Date:  12/2021  PCR confirmed (sx: (Patient-Rptd) Fever, Fatigue, Headache, Sore throat, Cough, Loss or disturbed taste, Muscle pain, Diarrhea, Sinus pressure, Shortness of breath, Runny nose, Abdominal pain, Brain fog  - no hospitalization, no treatment)    COVID-19 vaccine status: (Patient-Rptd) Pfizer, 4/16/21, 5/7/21     Occupation: unable to work due to chronic illness prior to COVID     Current Providers: PCP none, Neurology CNP Lan, GI CNP Skyler, Psychiatyr Dr. Pittman, Allergy/Immunology Dr. Valenzuela, Cardiology Dr. Shore, Psychology [facility]    Survey Scores: 10/2024 -> 04/2025  PHQ-9: 10 -> 12   MICHEL-7: 7 -> 8  Sleep Wellness: 4 -> 8  FSS average: 6.11 -> 6  Modified Ecog average: 2.5 -> 1.92  MOCA: 17/22 (10/2024)  Overall Health: 30 -> 40    39 y.o. nonbinary with a h/o COVID-19 as noted above, depression, MICHEL, PTSD, fibromyalgia, IBS, POTS, MTHFR gene mutation, hEDS, presents [facility] up at [facility] with c/o Dizziness, palpitations, cough, post-nasal drainage, GI complaints, concerns for MCAS, increased musculoskeletal pain, poor sleep, fatigue, brain fog, RLS    Doing pretty well, fatigue appears to be getting better  Rough time for a while with weather changes, warm weather is helping  Scheduled for PT pool therapy, having evaluation for this next week  Sleep study was normal, frequent leg movements are her baseline  Was on medication on RLS in the past, nothing worked well for her, keeps waking her up  H1/H2 blockers have worked well for GI problems  Propranolol 20mg twice daily works well for POTS  Pain  has been around a 6, joint pain, more when raining, looking forward to PT  Will be seeing rheumatology for Sjögren's concern, continued dry eyes and dry mouth  Sleeping pretty well, nightmares here and there due to PTSD  Mother's day coming up and dad's birthday was recently, hodliays are difficult, had flashbacks then  Therapy has been helping  Brain fog continues, hard to remember things and keep up, no change    Relevant prior healthcare visits:  -02/2025 Psychology LC group  -01/2025 Allergy/Immunology notes that patient does not meet criteria for MCAS at this time given normal urine studies  -11/2024 Psychiatry continues Pristiq for PTSD and MICHEL, continue therapy  -11/2024 GI ordered stool tests and blood work for diarrhea and abdominal pain, Ibgard and continue fiber for likely IBS  -09/2024 Neurology for POTS, resuming propranolol as well tolerated previously  -08/2024 PT aquatic for weakness, fibromyalgia, and POTS  -05/2024 PCP recommended nutrition supplements, follow up with allergy/immunology, restart Elavil and Pristiq  -01/2024 Cardiology recommends conservative measures for ? POTS, referred to autonomic neurology    Relevant prior diagnostic studies:  -04/2025 PSG normal  -12/2024 PFTs normal  -12/2021 autonomic testing consistent with POTS    Relevant prior laboratory values, unremarkable unless noted:  -01/2025 Tryptase, CMP, Anti-SSB, Anti-SSA, vitamin B12, UPEP, ALA, prophobilinogen, organic acids, metanephrines, urine copper, prostaglandin, n-methylhistamine  -11/2024 early Sjögren's positive, VGCC, K/L FLC, vitamin E, Vitamin B6 192, aldolase, MMA, CBC/D, Celiac panel, ceruloplasmin, coQ10, copper, homocysteine elevated, Mycoplasma pneumoniae IgM and IgG, anti-parietal cell ab, sPEP, RF, ESR 24, T3, T4, TSH, Vitamin B12, BECKI, WAYNE, Anti-thyroglobulin ab, catecholamines, carnitine, BMP, TPO ab, Dys2, CRP, tryptase  -07/2024 IgE to oat, strawberry, tomato, wheat, tryptase,   -03/2024 CRP 2.6,  homocysteine elevated, ferritin 38, Vitamin B12 462, TSH, T3, Mag 1.53, Lipids, CMP, CBC/D with WBC 12, vitamin D 26  -02/2024 Syphilis, HIV, Hepatitis    Exercise routine: none  Diet:  Weight hx: pre-COVID-19 165 lbs -> post-COVID-19 140 lbs -> (Patient-Rptd) 145   Substance use: former tobacco use, 0 servings ETOH   Social:     Current Medications[1]    Medical History[2]    Surgical History[3]    Family History[4]    Objective   There were no vitals taken for this visit.    Physical Exam  Constitutional:       Comments: no acute distress, alert/conversational, appropriate affect, no focal neurological deficits noted via video appointment          Assessment/Plan   Problem List Items Addressed This Visit           ICD-10-CM       High    Long COVID - Primary U09.9    05/2025:  Dizziness, palpitations, cough, post-nasal drainage, GI complaints, concerns for MCAS, increased musculoskeletal pain, poor sleep, fatigue, brain fog, RLS  -repeat Vitamin B6 level ordered, we will also check a ferritin level given restless leg syndrome  -continue propranolol 20mg twice daily, refill sent  -follow up with PT and rheumatology as scheduled  -continue antihistamine and H2 blocker twice daily, glad to hear that has been helping to control your GI complaints  -continue conservative measurs for POTS  -we can consider OT for fatigue and brain fog management as needed going forward    01/2025:  Dizziness, palpitations, cough, post-nasal drainage, GI complaints, concerns for MCAS, increased musculoskeletal pain, poor sleep, fatigue, brain fog  -repeat Vitamin B6 level ordered  -continue propranolol 20mg twice daily, refill sent  -resume Physical Therapy when you have transportation established  -referral to pain management provider Dr. Ochoa  -referral to rheumatology given concerns for Sjögren's, schedule with Dr. Marie Rojo or Dr. Belle Jordan   -continue antihistamine and H2 blocker twice daily, glad to hear  that has been helping to control your GI complaints  -continue conservative measurs for POTS  -follow up with sleep study as scheduled    10/2024:  Dizziness, palpitations, cough, post-nasal drainage, GI complaints, concerns for MCAS, increased musculoskeletal pain, poor sleep, fatigue, brain fog, numbness and tingling  -dysautonomia labs to evaluate for any reversible causes of POTS, please have these done at any  lab. Some of them require 24hr urine collection and a fasting glucose tolerance test, the lab will help you get these all set up.  -continue to use the conservative measures to help with POTS, also increase propranolol to 20mg twice daily  -I will order an in-lab sleep study to further evaluate your sleep, recommendations to improve sleep are given as well  -list of PCP providers will be emailed to you to help you establish with a PCP  -for suspected MCAS, increase loratadine and famotidine to twice daily and continue to follow the low histamine diet  -referral to [facility] Psychology support group, you will be called to set up this appointment  -continue to work with PT for pain and POTS  -we can consider referral to PM&R provider Dr. Dee Dee Gasria for pain management in light of EDS, another option would be Pain Management provider Dr. Ochoa who offers low dose Ketamine infusions, the [facility] pain clinic offers high-dose Ketamine infusions  -for ADHD concerns, you can consider trying a stimulant medication with your Psychiatrist  -tips given for brain fog and fatigue management, if no improvement will consider referral to virtual OT for cognitive rehab and fatigue management  -> stop B6 supplement         Relevant Orders    Ferritin    Vitamin B6            [1]   Current Outpatient Medications:     blood pressure test kit-medium kit, 1 kit if needed (POTS)., Disp: 1 kit, Rfl: 0    Gi 0.35 mg tablet, TAKE ONE TABLET BY MOUTH DAILY, Disp: 84 tablet, Rfl: 0    desvenlafaxine (Pristiq) 50 mg  24 hr tablet, Take 1 tablet (50 mg) by mouth once daily., Disp: 90 tablet, Rfl: 1    famotidine (Pepcid) 20 mg tablet, TAKE ONE TABLET BY MOUTH TWO TIMES A DAY, Disp: 180 tablet, Rfl: 0    folic acid (Folvite) 400 mcg tablet, Take by mouth once daily., Disp: , Rfl:     loratadine (Claritin Reditabs) 10 mg disintegrating tablet, Take 1 tablet (10 mg) by mouth 2 times a day., Disp: 180 tablet, Rfl: 0    valACYclovir (Valtrex) 500 mg tablet, Take 1 tablet (500 mg) by mouth once daily., Disp: 90 tablet, Rfl: 3    wheat dextrin (Benefiber Healthy Shape) 5 gram/7.4 gram powder, Take 1 teaspoon daily, Disp: 248 g, Rfl: 1    propranolol (Inderal) 10 mg tablet, Take 2 tablets (20 mg) by mouth 2 times a day., Disp: 120 tablet, Rfl: 2  [2]   Past Medical History:  Diagnosis Date    Anemia 2005    Anxiety 1992    Asthma 2000    Depression 1990    Aspen-Danlos syndrome ([facility])     Fibromyalgia     GERD (gastroesophageal reflux disease) 2001    Herpes 2005    HPV (human papilloma virus) infection 2004    Scoliosis 2008   [3]   Past Surgical History:  Procedure Laterality Date    COLPOSCOPY  ?    WISDOM TOOTH EXTRACTION  1998   [4]   Family History  Problem Relation Name Age of Onset    Anesthesia related problems Mother Merna Garcia     Depression Mother Merna Garcia     Diabetes Mother Merna Garcia     Hyperlipidemia Mother Merna Garcia     Mental illness Mother Merna Garcia     Vision loss Mother Merna Garcia     Alcohol abuse Father Jackson Del Angel     Depression Father Jackson Del Angel     Drug abuse Father Jackson Del Angel     Heart disease Father Jackson Del Angel     Hypertension Father Jackson Del Angel     Mental illness Father Jackson Del Angel     Asthma Brother Jackson II     Mental illness Brother Jackson II     Asthma Brother Luis     Birth defects Brother Luis     Intellectual Disability Brother Luis     Alcohol abuse Maternal Grandmother Abuela     Alcohol abuse Maternal Grandfather Bernabe     Cancer Maternal Grandfather Bernabe      Alcohol abuse Paternal Grandmother Schalupe     Alcohol abuse Paternal Grandfather Johann     Breast cancer Neg Hx      Ovarian cancer Neg Hx      Colon cancer Neg Hx

## 2025-05-02 ENCOUNTER — TELEMEDICINE (OUTPATIENT)
Dept: OTHER | Facility: CLINIC | Age: 40
End: 2025-05-02
Payer: MEDICAID

## 2025-05-02 DIAGNOSIS — U09.9 LONG COVID: Primary | ICD-10-CM

## 2025-05-02 PROCEDURE — 99212 OFFICE O/P EST SF 10 MIN: CPT | Performed by: NURSE PRACTITIONER

## 2025-05-02 PROCEDURE — 1036F TOBACCO NON-USER: CPT | Performed by: NURSE PRACTITIONER

## 2025-05-02 NOTE — ASSESSMENT & PLAN NOTE
05/2025:  Dizziness, palpitations, cough, post-nasal drainage, GI complaints, concerns for MCAS, increased musculoskeletal pain, poor sleep, fatigue, brain fog, RLS  -repeat Vitamin B6 level ordered, we will also check a ferritin level given restless leg syndrome  -continue propranolol 20mg twice daily, refill sent  -follow up with PT and rheumatology as scheduled  -continue antihistamine and H2 blocker twice daily, glad to hear that has been helping to control your GI complaints  -continue conservative measurs for POTS  -we can consider OT for fatigue and brain fog management as needed going forward    01/2025:  Dizziness, palpitations, cough, post-nasal drainage, GI complaints, concerns for MCAS, increased musculoskeletal pain, poor sleep, fatigue, brain fog  -repeat Vitamin B6 level ordered  -continue propranolol 20mg twice daily, refill sent  -resume Physical Therapy when you have transportation established  -referral to pain management provider Dr. Ochoa  -referral to rheumatology given concerns for Sjögren's, schedule with Dr. Marie Rooj or Dr. Belle Jordan   -continue antihistamine and H2 blocker twice daily, glad to hear that has been helping to control your GI complaints  -continue conservative measurs for POTS  -follow up with sleep study as scheduled    10/2024:  Dizziness, palpitations, cough, post-nasal drainage, GI complaints, concerns for MCAS, increased musculoskeletal pain, poor sleep, fatigue, brain fog, numbness and tingling  -dysautonomia labs to evaluate for any reversible causes of POTS, please have these done at any  lab. Some of them require 24hr urine collection and a fasting glucose tolerance test, the lab will help you get these all set up.  -continue to use the conservative measures to help with POTS, also increase propranolol to 20mg twice daily  -I will order an in-lab sleep study to further evaluate your sleep, recommendations to improve sleep are given as well  -list  of PCP providers will be emailed to you to help you establish with a PCP  -for suspected MCAS, increase loratadine and famotidine to twice daily and continue to follow the low histamine diet  -referral to [facility] Psychology support group, you will be called to set up this appointment  -continue to work with PT for pain and POTS  -we can consider referral to PM&R provider Dr. Dee Dee Garsia for pain management in light of EDS, another option would be Pain Management provider Dr. Ochoa who offers low dose Ketamine infusions, the [facility] pain clinic offers high-dose Ketamine infusions  -for ADHD concerns, you can consider trying a stimulant medication with your Psychiatrist  -tips given for brain fog and fatigue management, if no improvement will consider referral to virtual OT for cognitive rehab and fatigue management  -> stop B6 supplement

## 2025-05-02 NOTE — PATIENT INSTRUCTIONS
It was my pleasure seeing you in the [facility] today.  We will focus on addressing the following symptoms discussed today: Dizziness, palpitations, cough, post-nasal drainage, GI complaints, concerns for MCAS, increased musculoskeletal pain, poor sleep, fatigue, brain fog, RLS    My recommendations are as follows:  -repeat Vitamin B6 level ordered, we will also check a ferritin level given restless leg syndrome  -continue propranolol 20mg twice daily, refill sent  -follow up with PT and rheumatology as scheduled  -continue antihistamine and H2 blocker twice daily, glad to hear that has been helping to control your GI complaints  -continue conservative measurs for POTS  -we can consider OT for fatigue and brain fog management as needed going forward    Tips to help improve brain fog and fatigue:  --avoid drinking Alcohol while recovering from Long COVID  --Focus on eating whole foods to help support your gut and immune system. Aim to eat 30 different plants per week (vegetables, fruits, beans, nuts, legumes, seeds, whole grains, herbs, spices). Avoid processed foods and beverages. Eliminate added sugars, artificial sweeteners, processed oils, artificial dyes.  --ensure to practice 30 minutes of exercise 7 days per week to keep BNDGF (brain derived neurotrophic growth factor) elevated as this will help in the regeneration of neurons, you may split exercise time up into 5 minute increments if this is better tolerated.   --slowly increase your activity by no more than 10% per week, rest when you feel tired  --utilize pacing techniques to manage fatigue, schedule rest times throughout the day so you do not run out of energy, more information to be found on this here: http://[email or web address].ca/health-info-site/Documents/post_covid-19_fatigue.pdf  --use the “attention beam” strategy to help you focus on some things while ignoring others. Imagine a flashlight beam illuminating the task that you are trying to focus on  "while leaving everything else in the dark.  --minimize external distractions to help with attention. For example, turn off the TV, radio, music, heater, and other electrical equipment, and close the window to screen out traffic noise. Complete important or difficult tasks in a quiet room if possible. Switch off mobile phones, switch off automatic email notifications. Use ear plugs if necessary or noise-cancelling headphones. Reduce visual distractions by clearing off your work-space, sit opposite to a window. Make sure lighting in the workspace is adequate.  --minimize internal distractions to help with attention. Thoughts, feelings, and physical sensations such as hunger or pain can be distracting. When you notice your attention beam is directed toward a thought or sensation, gently direct your attention back to the central focal point. You can also practice mindfulness and/or meditation to help reduce internal distractions  --games that can be tried to help improve memory and attention are Brain HQ and N-Back games  --mindfulness and meditation can be learned with the smartphone apps “Unwinding Anxiety” and “Headspace”  --Review the  Health Talk on Managing Fatigue and Thinking Changes after COVID-19 here: https://www.hospitals.org/Health-Talks/articles/2022/05/managing-fatigue-and-thinking-changes-after-covid-19  --You can also find many helpful tips and tricks in this book: \"The Long COVID self-help guide, practical ways to manage symptoms\" by [employer] from the [facility] [place]  --additional apps, books, and podcasts for patients suffering from Long COVID can be found at https://www.[facility] [place]well[facility]/public-health/long-covid/long-covid-apps-books-podcasts/     conservative measures to help with dysautonomia symptoms:  --drinking 1 gallon of water/day (or a mix of fluids, non sugary and minimally caffeinated)   --Eating 6g of table salt (3 tsp) in addition to salt used in cooking of " naturally present in food. Avoid salt tablets and monitor BP closely  --Wearing compression stockings, grade 40-50 mmHg up to the waist every day, to be removed before bedtime  --Sleeping with the head of the bed up 45 degrees, even for short naps. Avoid stacking pillow or wedges under the neck or the back. Bend the mattress in the middle and raise the head part and stack bricks underneath. Or invest  in an adjustable bed.  --Water jogging (ie. running in the shallow part of the pool, water waist or chest level), 1 hour a day or 90 minutes 3 times a week.     To help improve sleep:  --try Melatonin children's liquid drops 1-2mg underneath your tongue 30 minutes before you go to bed  --go to bed at the same time each night and get up at the same time each morning, including the weekend  --goal of 7-9 hours of uninterrupted sleep per night  --make sure your bedroom is quiet, dark, relaxing, and at a comfortable temperature  --remove electronic devices, such as TVs, computers, and smart phones, from your bedroom  --avoid caffeine after the morning and large meals before bed  --drink plenty of water throughout the day but avoid drinking fluids two hours before bed  --expose yourself to bright light in the morning  --engage in a calming bed-time routine of warm bath/shower, gratitude journal, guided meditation, etc.  --do not lay awake in bed for more than 20 minutes, get up and engage in a soothing activity such as reading a boring book until you feel sleepy     We will send a message in [email or web address] or call you with the results of your tests.  Further recommendations will follow based on testing results and your symptoms.   Please return to [facility] in 3 months, call [phone] or send a message through your [email or web address] emmy if needed.    If you are interested in joining a clinical trial, look into the following  resources:  https://clinicaltrials.gov/  https://[email or web address].org/  [email or web address]/resources/clinical-trials/

## 2025-05-05 DIAGNOSIS — G90.A POTS (POSTURAL ORTHOSTATIC TACHYCARDIA SYNDROME): ICD-10-CM

## 2025-05-05 DIAGNOSIS — U09.9 POST-ACUTE SEQUELAE OF COVID-19 (PASC): ICD-10-CM

## 2025-05-05 DIAGNOSIS — R10.84 GENERALIZED ABDOMINAL PAIN: ICD-10-CM

## 2025-05-06 ENCOUNTER — APPOINTMENT (OUTPATIENT)
Dept: PHYSICAL THERAPY | Facility: CLINIC | Age: 40
End: 2025-05-06
Payer: MEDICAID

## 2025-05-06 DIAGNOSIS — G90.A POSTURAL ORTHOSTATIC TACHYCARDIA SYNDROME (POTS): ICD-10-CM

## 2025-05-06 DIAGNOSIS — R53.1 WEAKNESS: Primary | ICD-10-CM

## 2025-05-06 DIAGNOSIS — Z74.09 OTHER REDUCED MOBILITY: ICD-10-CM

## 2025-05-06 DIAGNOSIS — Z74.09 DECREASED FUNCTIONAL MOBILITY AND ENDURANCE: ICD-10-CM

## 2025-05-06 RX ORDER — FAMOTIDINE 20 MG/1
20 TABLET, FILM COATED ORAL 2 TIMES DAILY
Qty: 180 TABLET | Refills: 0 | Status: SHIPPED | OUTPATIENT
Start: 2025-05-06

## 2025-05-06 RX ORDER — PROPRANOLOL HYDROCHLORIDE 10 MG/1
20 TABLET ORAL 2 TIMES DAILY
Qty: 120 TABLET | Refills: 0 | Status: SHIPPED | OUTPATIENT
Start: 2025-05-06

## 2025-05-08 ENCOUNTER — TELEPHONE (OUTPATIENT)
Dept: OBSTETRICS AND GYNECOLOGY | Facility: CLINIC | Age: 40
End: 2025-05-08
Payer: MEDICAID

## 2025-05-08 ENCOUNTER — EVALUATION (OUTPATIENT)
Dept: PHYSICAL THERAPY | Facility: CLINIC | Age: 40
End: 2025-05-08
Payer: MEDICAID

## 2025-05-08 DIAGNOSIS — G90.A POSTURAL ORTHOSTATIC TACHYCARDIA SYNDROME (POTS): ICD-10-CM

## 2025-05-08 DIAGNOSIS — Z74.09 OTHER REDUCED MOBILITY: ICD-10-CM

## 2025-05-08 DIAGNOSIS — Z74.09 DECREASED FUNCTIONAL MOBILITY AND ENDURANCE: ICD-10-CM

## 2025-05-08 DIAGNOSIS — R53.1 WEAKNESS: Primary | ICD-10-CM

## 2025-05-08 PROCEDURE — 97162 PT EVAL MOD COMPLEX 30 MIN: CPT | Mod: GP

## 2025-05-08 PROCEDURE — 97110 THERAPEUTIC EXERCISES: CPT | Mod: GP

## 2025-05-08 NOTE — TELEPHONE ENCOUNTER
"----- Message from Elly FRANKS sent at 5/7/2025  4:01 PM EDT -----  Regarding: VINAYAK Zhu has a procedure 6-16-25 at [facility] with attached patient  FYI DR Zhu has a procedure 6-16-25 at [facility] with attached patientLetters stating \"Getting Ready For Surgery\", \"Gynecologic Surgery: What to expect\" and the lab locations have been emailed to the patient today. DR Zhu, Pre-OP and or labs can now be ordered if you haven't already done so, Thank you, Elly"

## 2025-05-08 NOTE — TELEPHONE ENCOUNTER
Pt contacted to go over surgery details for the [facility] procedure scheduled with dr smith on 6/16/25.  Understanding voiced.

## 2025-05-08 NOTE — PROGRESS NOTES
Physical Therapy  Physical Therapy Orthopedic Evaluation and Treatment    Patient Name: Alanna Del Angel  MRN: [id number]  Today's Date: 5/8/2025  Time Calculation  Start Time: 1300  Stop Time: 1337  Time Calculation (min): 37 min    Insurance:  2025 [employer] AUTH AFTER 30 VS VERNELL YR     General:  Reason for visit: LBP, Hip pain, POTS, generalized weakness, here for aquatic therapy evaluation         Current Problem:  1. Weakness        2. Postural orthostatic tachycardia syndrome (POTS)        3. Other reduced mobility        4. Decreased functional mobility and endurance            Precautions: POTS, EDS, Tachycardia        Medical History Form: Reviewed (scanned into chart)    Subjective:   Subjective   Chief Complaint: Patient presents to clinic with complaints of chronic LB and hip pain as well as decreased tolerance to activity. Has done aquatic therapy in the past and felt it helped build strength, endurance, and stability. They report they have been working on exercises at home (light weights) and mostly walking with dog. Able to walk ~3-4 blocks before pain sets in.  Onset Date: >10 years, EDS diagnosis >/=5 years ago  MADIHA: Insidious    Current Condition:   Same    Pain:     Location: LB and some mid back pain  Description: ache and sometimes pinch.   Aggravating Factors:  Walking and Running, lifting from ground  Relieving Factors:  medication; medical marijuana, ketamine    Relevant Information (PMH & Previous Tests/Imaging):   Previous Interventions/Treatments: Physical Therapy aquatic     Prior Level of Function (PLOF)  Patient previously independent with all ADLs  Exercise/Physical Activity: computer games, mental health advocacy   Work/School: does not work; [employer] for games     Patients Living Environment: Reviewed and no concern    Primary Language: English    Patient's Goal(s) for Therapy: have less pain, increase strength and stability, focus on endurance and strength    Red Flags: Do you have  any of the following? No  Fever/chills, unexplained weight changes, fainting, unexplained change in bowel or bladder functions, unexplained malaise or muscle weakness, numbness or tingling  Does report some dizziness and pain at night in back and hips     Objective:  Objective   Spine Musculoskeletal Exam    Gait    Gait is normal.    Inspection    Thoracolumbar    Thoracolumbar inspection is normal.    Palpation    Thoracolumbar    Thoracolumbar palpation is normal.    Range of Motion    Thoracolumbar       Flexion: 75%. Flexion detail: pain.     Extension: 75%. Extension detail: pain.       Right      Lateral bending: normal.       Left      Lateral bending: normal.      Strength    Thoracolumbar       Right      Tibialis anterior: 5/5.       Plantar flexion: 5/5.       Quadriceps: 4+/5.       Hamstrin+/5.       Hip abductors: 4+/5. Hip abductors are affected by pain.       Hip flexion: 4+/5.       Hip adduction: 4+/5.        Left      Tibialis anterior: 5/5.       Plantar flexion: 5/5.       Quadriceps: 4+/5.       Hamstrin+/5.       Hip abductors: 4+/5. Hip abductors are affected by pain.       Hip flexion: 4+/5.       Hip adduction: 4+/5.      Sensory    Thoracolumbar    Thoracolumbar sensation is normal.    Reflexes    Thoracolumbar reflexes are normal.    Special Tests    Thoracolumbar      Right      STEVEN test: negative      SLR: back pain      Left      STEVEN test: positive      SLR: back pain    Posture: FHP noted as well as rounded shoulders and protracted scapula    Aquatic Therapy Screen:    Contraindications Yellow Flags   Open Wounds: - Cardiovascular Disease: -   Tracheotomy: - Cardiopulmonary Disease: -   Incontinence: - Kidney Dysfunction: -   Epilepsy: - Skin Conditions: -   UTI: - Diabetes: -   Hepatitis: -    Hydrophobia: -    Contagious Diseases: -          Outcome Measures:  COLT: 50% (25/50)       EDUCATION: Home exercise program, plan of care, activity modifications, pain management,  and injury pathology     Access Code: [email or web address]  URL: [email or web address]  Date: 05/08/2025  Prepared by: Darby Abraham    Exercises  - Supine Bridge  - 2 x daily - 7 x weekly - 3 sets - 10 reps  - Supine Posterior Pelvic Tilt  - 2 x daily - 7 x weekly - 3 sets - 10 reps  - Standing Anti-Rotation Press with Anchored Resistance  - 2 x daily - 7 x weekly - 3 sets - 10 reps    Goals: Set and discussed today    Patient will be able to perform 30 minutes of aquatic exercise with reported </=  1/10 pain level    Patient will  ambulate up/ down pool ramp without UE assist or decrease UE assist .    Patient will display improved gait quality on pool deck demonstrating improved shira /step through pattern    Patient will ascend/ descend 8 inch step in 3 1/2 feet water with good control without UE assist     Patient will demonstrate dynamic lumbar stabilization (DLS) with good control, performing with UE involvement and paddle resistance at level 5. (level 5 is the hardest/ level 1 is the easiest)    Patient will perform 5x sit to stand from bench without the use of upper extremities </= 15 seconds to show improved lower extremity functional strength.    Patient will perform single leg stance (SLS) in 4 foot water depth without the use of upper extremity assist.    Patient will perform/recall >/= 85% of aquatic program without cueing.      Plan of care was developed with input and agreement by the patient    Treatment Performed:    Therapeutic Exercise:    15 min  Pelvic Tilt x10  Bridge x10; cue for glute set prior to lift  Payloff press green tband x10 B    Manual Therapy:     min      Neuromuscular Re-education:   min      Other:      min        Assessment: Patient presents with signs and symptoms consistent with chronic low back and bilateral hip pain secondary to EDS. Pt demonstrates increased mobility in B hip joints with pain elicited with B hip ER. Additionally pt presents with dec  thoracic and lumbar extension with pain provoked by these movements. Pt COLT score indicates sever disability.These impairments result in limited participation in pain-free ADLs and inability to perform at their prior level of function. Pt would benefit from physical therapy to address the impairments found & listed previously in the objective section in order to return to safe and pain-free ADLs and prior level of function.       Patient with the following impairments: decreased muscle performance, decreased activity tolerance, pain, and participation restrictions    Patient's response to session: Increased knowledge and understanding      Plan:     Planned Interventions include: therapeutic exercise, self-care home management, manual therapy, therapeutic activities, gait training, neuromuscular coordination, vasopneumatic, dry needling, aquatic therapy  Frequency: 1 x Week  Duration: 8 Weeks      Darby Abraham, PT

## 2025-05-13 ENCOUNTER — APPOINTMENT (OUTPATIENT)
Dept: PHYSICAL THERAPY | Facility: CLINIC | Age: 40
End: 2025-05-13
Payer: MEDICAID

## 2025-05-13 DIAGNOSIS — Z74.09 OTHER REDUCED MOBILITY: ICD-10-CM

## 2025-05-13 DIAGNOSIS — G90.A POSTURAL ORTHOSTATIC TACHYCARDIA SYNDROME (POTS): ICD-10-CM

## 2025-05-13 DIAGNOSIS — Z74.09 DECREASED FUNCTIONAL MOBILITY AND ENDURANCE: ICD-10-CM

## 2025-05-13 DIAGNOSIS — R53.1 WEAKNESS: Primary | ICD-10-CM

## 2025-05-13 PROCEDURE — 97113 AQUATIC THERAPY/EXERCISES: CPT | Mod: GP

## 2025-05-13 NOTE — PROGRESS NOTES
Physical Therapy Treatment      Patient Name: Alanna Del Angel  MRN: [id number]  Today's Date: 5/13/2025  Visit #2  Time Calculation  Start Time: 0905  Stop Time: 0930  Time Calculation (min): 25 min    Insurance:  2025 [employer] AUTH AFTER 30 VS VERNELL YR     Assessment:  Initiated aquatic therapy this date. Pt has previously completed aquatic physical therapy so they were familiar with the flow of treatment. All exercises were tolerated well this date. Some low back discomfort with forward walking initially. Required 2 rest breaks during session to stretch low back. Conpleted one set of each exercise due to time constraints. Noted dec LBP after session.     Patient's response to session: Increased knowledge and understanding  Aquatic PT is required due to, buoyancy of water reduced weight bearing and decreased LE and spinal loading, allowing for more freedom of movement and promotes improved ability to perform functional tasks.  The viscosity of water which is more than 700x that of air, allowed increased reaction time, in turn allowing advanced balance activities to be safely performed and allow patient to be challenged beyond limited of stability without fear of falling; provides the opportunity to work on balance in a safe environment.  The hydrostatic pressure of water facilitates the reduction of edema in the lower extremities, allowing for greater ease of movement.  Aquatics d/t cardio vascular benefits including: improved cardiovascular efficiency including increased stroke volume, decreased HR, and decreased blood pressure with increased cardiac output allowing patient to achieve cardiovascular training effect with less work load.   Water promotes a rapid return to movement after surgery due to a low impact environment.  Aquatics to support upright posture during postural retraining and strengthening.   Aquatics allow patient to work on gait with less assistance or without assistive device improving posture  and ease of gait training/conditioning.     Plan:  Continue per POC. Focus on dynamic core stabilization, hip and quad strength, as well as inc endurance     Current Problem:   1. Weakness        2. Postural orthostatic tachycardia syndrome (POTS)        3. Other reduced mobility        4. Decreased functional mobility and endurance            Subjective     Pt reports they are feeling okay today. Had one day of dizziness since last visit but nothing too bad.      Pain: 5-6/10 (normal baseline currently)       Objective       Treatments:  Therapeutic Exercise (07411):   minutes      Manual Therapy (43326):   minutes      Neuromuscular Re-education (94665):   minutes    Aquatic Therapy (17807):   30 Min  Forward walkin laps with current  Low back stretch at wall 30s 2x  Mini Squats: 2 x 10:  Lateral walking  2 x 10  Hip circles:    1 x 10 (B) (CW/CCW)  HS curls  1 x 10 (B)  Hip ABD  1 x 10 (B)  Hip FLX  1 x 10  Hip EXT  1 x 10 (B)  Standing  (B UE support)  Standing Bicycle kicks:  1 x 10 (B)  Heel Raise:  20   Horizontal shoulder ABD/ADD: 20 x Closed hands  Shoulder ABD/ADD: 20 x closed hands  Shoulder FLX/EXT: 20 closed hand  Shoulder IR/ER: 20 Closed hands    Education and discussion on HEP and treatment regarding the benefits related to current condition, POC, pathophysiology, and precautions    *added to HEP

## 2025-05-20 ENCOUNTER — APPOINTMENT (OUTPATIENT)
Dept: PHYSICAL THERAPY | Facility: CLINIC | Age: 40
End: 2025-05-20
Payer: MEDICAID

## 2025-05-20 DIAGNOSIS — R53.1 WEAKNESS: Primary | ICD-10-CM

## 2025-05-20 DIAGNOSIS — Z74.09 DECREASED FUNCTIONAL MOBILITY AND ENDURANCE: ICD-10-CM

## 2025-05-20 DIAGNOSIS — Z74.09 OTHER REDUCED MOBILITY: ICD-10-CM

## 2025-05-20 DIAGNOSIS — G90.A POSTURAL ORTHOSTATIC TACHYCARDIA SYNDROME (POTS): ICD-10-CM

## 2025-05-20 PROCEDURE — 97113 AQUATIC THERAPY/EXERCISES: CPT | Mod: GP

## 2025-05-20 NOTE — PROGRESS NOTES
Physical Therapy Treatment      Patient Name: Alanna Del Angel  MRN: [id number]  Today's Date: 5/20/2025  Visit #3  Time Calculation  Start Time: 0901  Stop Time: 0932  Time Calculation (min): 31 min    Insurance:  2025 [employer] AUTH AFTER 30 VS VERNELL YR     Assessment:  Continuation of physical therapy, this session, trial of 2# ankle weights this session, due to good tolerance for previous session.  Pt reported  Conpleted one set of  day of fatigue post therapy session for previous, and recovered. each exercise due to time constraints. Noted dec LBP after session. Pt would continue to benefit from aquatic therapy for POTS related symptoms, endurance deficits and generalized weakness.    Patient's response to session: No change in pain, Increased ROM/joint mobility, Increase motor control, and Increased knowledge and understanding  Aquatic PT is required due to, buoyancy of water reduced weight bearing and decreased LE and spinal loading, allowing for more freedom of movement and promotes improved ability to perform functional tasks.  The viscosity of water which is more than 700x that of air, allowed increased reaction time, in turn allowing advanced balance activities to be safely performed and allow patient to be challenged beyond limited of stability without fear of falling; provides the opportunity to work on balance in a safe environment.  The hydrostatic pressure of water facilitates the reduction of edema in the lower extremities, allowing for greater ease of movement.  Aquatics d/t cardio vascular benefits including: improved cardiovascular efficiency including increased stroke volume, decreased HR, and decreased blood pressure with increased cardiac output allowing patient to achieve cardiovascular training effect with less work load.   Water promotes a rapid return to movement after surgery due to a low impact environment.  Aquatics to support upright posture during postural retraining and  "strengthening.   Aquatics allow patient to work on gait with less assistance or without assistive device improving posture and ease of gait training/conditioning.     Plan:  Continue per POC. Focus on dynamic core stabilization, hip and quad strength, as well as inc endurance.     When pt ready, planning on hybrid 2x/wk land and aquatic   Current Problem:   1. Weakness        2. Postural orthostatic tachycardia syndrome (POTS)        3. Other reduced mobility        4. Decreased functional mobility and endurance            Subjective     Pt reports, \"I have been okay, I only had one day of fatigue after last session, so it was okay, I am thinking about doing land therapy in addition to aquatics to help build up strength.\"      Pain: 4/10 (normal baseline currently)       Objective       Treatments:  Therapeutic Exercise (26152):   minutes      Manual Therapy (41567):   minutes      Neuromuscular Re-education (57370):   minutes    Aquatic Therapy (04359):   30 Min 2 units  All exercises completed with 2# ankle weights  Forward walkin laps with current  Backwards walkin# 2 laps.   Low back stretch at wall 30s 2x  Mini Squats: 2 x 10:  Lateral walking  2 x 10  Hip circles:    1 x 10 (B) (CW/CCW)  HS curls  1 x 10 (B)  Hip ABD  1 x 10 (B)  Hip FLX  1 x 10  Hip EXT (not completed this session)  1 x 10 (B)  Standing  (B UE support)  Standing Bicycle kicks:  1 x 10 (B)  Heel Raise:  20   Horizontal shoulder ABD/ADD: 20 x Closed hands  Shoulder ABD/ADD: 20 x closed hands  Shoulder FLX/EXT: 20 closed hand  Shoulder IR/ER: 20 Closed hands    Education and discussion on HEP and treatment regarding the benefits related to current condition, POC, pathophysiology, and precautions  "

## 2025-05-21 ENCOUNTER — APPOINTMENT (OUTPATIENT)
Dept: RHEUMATOLOGY | Facility: CLINIC | Age: 40
End: 2025-05-21
Payer: MEDICAID

## 2025-05-27 ENCOUNTER — APPOINTMENT (OUTPATIENT)
Dept: PHYSICAL THERAPY | Facility: CLINIC | Age: 40
End: 2025-05-27
Payer: MEDICAID

## 2025-05-27 DIAGNOSIS — Z74.09 DECREASED FUNCTIONAL MOBILITY AND ENDURANCE: ICD-10-CM

## 2025-05-27 DIAGNOSIS — G90.A POSTURAL ORTHOSTATIC TACHYCARDIA SYNDROME (POTS): ICD-10-CM

## 2025-05-27 DIAGNOSIS — Z74.09 OTHER REDUCED MOBILITY: ICD-10-CM

## 2025-05-27 DIAGNOSIS — R53.1 WEAKNESS: Primary | ICD-10-CM

## 2025-05-27 PROCEDURE — 97113 AQUATIC THERAPY/EXERCISES: CPT | Mod: GP

## 2025-05-29 ENCOUNTER — APPOINTMENT (OUTPATIENT)
Dept: RHEUMATOLOGY | Facility: CLINIC | Age: 40
End: 2025-05-29
Payer: MEDICAID

## 2025-05-29 VITALS
DIASTOLIC BLOOD PRESSURE: 82 MMHG | SYSTOLIC BLOOD PRESSURE: 119 MMHG | OXYGEN SATURATION: 99 % | HEART RATE: 85 BPM | BODY MASS INDEX: 27.05 KG/M2 | WEIGHT: 154.6 LBS

## 2025-05-29 DIAGNOSIS — G89.29 OTHER CHRONIC PAIN: ICD-10-CM

## 2025-05-29 DIAGNOSIS — M25.50 ARTHRALGIA, UNSPECIFIED JOINT: ICD-10-CM

## 2025-05-29 DIAGNOSIS — L40.9 PSORIASIS: Primary | ICD-10-CM

## 2025-05-29 DIAGNOSIS — H04.129 DRY EYE: ICD-10-CM

## 2025-05-29 PROCEDURE — 99205 OFFICE O/P NEW HI 60 MIN: CPT | Performed by: STUDENT IN AN ORGANIZED HEALTH CARE EDUCATION/TRAINING PROGRAM
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
## 2025-05-29 NOTE — PATIENT INSTRUCTIONS
See dermatology to see if you have psoriasis- I will place a referral     Please see ophthalmology to evaluate if you have signs of Sjogrens- I will place a referral    For dry mouth, use symptomatic treatment (frequent sips of water, sugarfree gum, saliva substitutes, biotene mouthwash) see dentist regularly, try to quit smoking    Regarding if you have sjogrens, its hard to say. I do not put much stock into the early sjogrens test, and would not recommend a lip biopsy. Treatment is symptomatic regardless    I dont know why you have an elevated WBC    Regarding if you have autoimmune joint pain  -some of your symptoms could be consistent , although I do not see overt inflammation  -Please get a HLAb27 (genetic marker for psoriatic arthritis), XR of your SI joints (to look for inflammation), CCP (another test for rheumatoid arthritis)  -Please use tylenol 1000 mg up to three times a day and over the counter nsaids sparingly     Please followup in 6 months, sooner if needed    Your vitamin D was low in the past which can contribute to joint/bone  pain; please take 2000 units  Vit D3 daily and you can have your PMD recheck after about 6 months, and increase your dose if needed

## 2025-06-03 ENCOUNTER — APPOINTMENT (OUTPATIENT)
Dept: PHYSICAL THERAPY | Facility: CLINIC | Age: 40
End: 2025-06-03
Payer: MEDICAID

## 2025-06-03 DIAGNOSIS — G90.A POSTURAL ORTHOSTATIC TACHYCARDIA SYNDROME (POTS): ICD-10-CM

## 2025-06-03 DIAGNOSIS — Z74.09 OTHER REDUCED MOBILITY: ICD-10-CM

## 2025-06-03 DIAGNOSIS — Z74.09 DECREASED FUNCTIONAL MOBILITY AND ENDURANCE: ICD-10-CM

## 2025-06-03 DIAGNOSIS — R53.1 WEAKNESS: Primary | ICD-10-CM

## 2025-06-03 PROCEDURE — 97113 AQUATIC THERAPY/EXERCISES: CPT | Mod: GP

## 2025-06-03 RX ORDER — NORETHINDRONE 0.35 MG/1
1 TABLET ORAL DAILY
COMMUNITY

## 2025-06-03 NOTE — PROGRESS NOTES
Physical Therapy Treatment      Patient Name: Alanna Del Angel  MRN: [id number]  Today's Date: 6/3/2025  Visit #4       Insurance:  [address] AFTER 30 VS VERNELL YR     Assessment:  Pt had good tolerance to the addition of ankle weights last aquatic session, continued with 2# ankle weights this session. Rest breaks, throughout the session, secondary to post exercise fatigue after last session. Pt reported hip and lower back irritation at the start of the last session, however reported no increase in irritation after last session, just fatigue. Updated HEP this session, including hip and low back stretches to help reduce tissue irritation outside of aquatic setting.   Pt would continue to benefit from aquatic therapy for POTS related symptoms, endurance deficits and generalized weakness.    Patient's response to session: No change in pain, Increased ROM/joint mobility, Increase motor control, and Increased knowledge and understanding  Aquatic PT is required due to, buoyancy of water reduced weight bearing and decreased LE and spinal loading, allowing for more freedom of movement and promotes improved ability to perform functional tasks.  The viscosity of water which is more than 700x that of air, allowed increased reaction time, in turn allowing advanced balance activities to be safely performed and allow patient to be challenged beyond limited of stability without fear of falling; provides the opportunity to work on balance in a safe environment.  The hydrostatic pressure of water facilitates the reduction of edema in the lower extremities, allowing for greater ease of movement.  Aquatics d/t cardio vascular benefits including: improved cardiovascular efficiency including increased stroke volume, decreased HR, and decreased blood pressure with increased cardiac output allowing patient to achieve cardiovascular training effect with less work load.   Water promotes a rapid return to movement after surgery due  "to a low impact environment.  Aquatics to support upright posture during postural retraining and strengthening.   Aquatics allow patient to work on gait with less assistance or without assistive device improving posture and ease of gait training/conditioning.     Plan:  Continue per POC. Focus on dynamic core stabilization, hip and quad strength, as well as inc endurance.     When pt ready, planning on hybrid 2x/wk land and aquatic   Current Problem:   1. Weakness        2. Postural orthostatic tachycardia syndrome (POTS)        3. Other reduced mobility        4. Decreased functional mobility and endurance          Subjective   Pt reports, \"I was really exhausted after last session, I was pretty wiped out\"      Pain: Lower back, and both of my hips.        Objective       Treatments:  Therapeutic Exercise (42422):   minutes      Manual Therapy (09842):   minutes      Neuromuscular Re-education (44155):   minutes    Aquatic Therapy (24137):    Min  units  All exercises completed with 2# ankle weights  Forward walkin laps with current  Backwards walking.   Low back stretch at wall 30s 2x  Mini Squats: 2 x 10:  Lateral walking  2 x 10  Hip circles:    1 x 10 (B) (CW/CCW)  HS curls  1 x 10 (B)  Hip ABD  1 x 10 (B)  Hip FLX  1 x 10  Hip EXT (not completed this session)  1 x 10 (B)  Standing  (B UE support)  Standing Bicycle kicks:  1 x 10 (B)  Heel Raise:  20   Horizontal shoulder ABD/ADD: 20 x Closed hands  Shoulder ABD/ADD: 20 x closed hands  Shoulder FLX/EXT: 20 closed hand  Shoulder IR/ER: 20 Closed hands  Floating in water post therapy session for spinal decompression.     Education and discussion on HEP and treatment regarding the benefits related to current condition, POC, pathophysiology, and precautions    Access Code: [email or web address]  URL: [email or web address]  Date: 2025  Prepared by: Kin Suazo    Exercises  - Seated Flexion Stretch with Swiss Ball  - 1 x daily - 7 x weekly " - 1 sets - 10 reps  - Child's Pose Stretch  - 1 x daily - 7 x weekly - 1 sets - 10 reps - 5s hold  - Seated Piriformis Stretch with Trunk Bend  - 1 x daily - 7 x weekly - 1 sets - 10 reps - 5s hold  - Seated Hamstring Stretch  - 1 x daily - 7 x weekly - 3 sets - 10 reps - 5s hold

## 2025-06-05 DIAGNOSIS — F43.10 PTSD (POST-TRAUMATIC STRESS DISORDER): ICD-10-CM

## 2025-06-05 DIAGNOSIS — F41.1 GAD (GENERALIZED ANXIETY DISORDER): ICD-10-CM

## 2025-06-05 DIAGNOSIS — G90.A POTS (POSTURAL ORTHOSTATIC TACHYCARDIA SYNDROME): ICD-10-CM

## 2025-06-05 RX ORDER — DESVENLAFAXINE 50 MG/1
TABLET, FILM COATED, EXTENDED RELEASE ORAL
Qty: 90 TABLET | Refills: 0 | OUTPATIENT
Start: 2025-06-05

## 2025-06-06 RX ORDER — PROPRANOLOL HYDROCHLORIDE 10 MG/1
20 TABLET ORAL 2 TIMES DAILY
Qty: 120 TABLET | Refills: 0 | Status: SHIPPED | OUTPATIENT
Start: 2025-06-06

## 2025-06-10 ENCOUNTER — APPOINTMENT (OUTPATIENT)
Dept: PHYSICAL THERAPY | Facility: CLINIC | Age: 40
End: 2025-06-10
Payer: MEDICAID

## 2025-06-10 DIAGNOSIS — Z74.09 DECREASED FUNCTIONAL MOBILITY AND ENDURANCE: ICD-10-CM

## 2025-06-10 DIAGNOSIS — G90.A POSTURAL ORTHOSTATIC TACHYCARDIA SYNDROME (POTS): ICD-10-CM

## 2025-06-10 DIAGNOSIS — Z74.09 OTHER REDUCED MOBILITY: ICD-10-CM

## 2025-06-10 DIAGNOSIS — R53.1 WEAKNESS: Primary | ICD-10-CM

## 2025-06-10 NOTE — PROGRESS NOTES
Physical Therapy Treatment      Patient Name: Alanna Del Angel  MRN: [id number]  Today's Date: 6/10/2025  Visit #5       Insurance:  [address] AFTER 30 VS VERNELL YR     Assessment:  Pt had good tolerance to the addition of ankle weights last aquatic session, continued with 2# ankle weights this session. Rest breaks, throughout the session, secondary to post exercise fatigue after last session. Pt reported hip and lower back irritation at the start of the last session, however reported no increase in irritation after last session, just fatigue. Updated HEP this session, including hip and low back stretches to help reduce tissue irritation outside of aquatic setting.   Pt would continue to benefit from aquatic therapy for POTS related symptoms, endurance deficits and generalized weakness.    Patient's response to session: No change in pain, Increased ROM/joint mobility, Increase motor control, and Increased knowledge and understanding  Aquatic PT is required due to, buoyancy of water reduced weight bearing and decreased LE and spinal loading, allowing for more freedom of movement and promotes improved ability to perform functional tasks.  The viscosity of water which is more than 700x that of air, allowed increased reaction time, in turn allowing advanced balance activities to be safely performed and allow patient to be challenged beyond limited of stability without fear of falling; provides the opportunity to work on balance in a safe environment.  The hydrostatic pressure of water facilitates the reduction of edema in the lower extremities, allowing for greater ease of movement.  Aquatics d/t cardio vascular benefits including: improved cardiovascular efficiency including increased stroke volume, decreased HR, and decreased blood pressure with increased cardiac output allowing patient to achieve cardiovascular training effect with less work load.   Water promotes a rapid return to movement after surgery due  "to a low impact environment.  Aquatics to support upright posture during postural retraining and strengthening.   Aquatics allow patient to work on gait with less assistance or without assistive device improving posture and ease of gait training/conditioning.     Plan:  Continue per POC. Focus on dynamic core stabilization, hip and quad strength, as well as inc endurance.     When pt ready, planning on hybrid 2x/wk land and aquatic   Current Problem:   1. Weakness        2. Postural orthostatic tachycardia syndrome (POTS)        3. Other reduced mobility        4. Decreased functional mobility and endurance          Subjective   Pt reports, \"I was really exhausted after last session, I was pretty wiped out\"      Pain: Lower back, and both of my hips.        Objective       Treatments:  Therapeutic Exercise (47602):   minutes      Manual Therapy (22805):   minutes      Neuromuscular Re-education (75385):   minutes    Aquatic Therapy (17010):    Min  units  All exercises completed with 2# ankle weights  Forward walkin laps with current  Backwards walking.   Low back stretch at wall 30s 2x  Mini Squats: 2 x 10:  Lateral walking  2 x 10  Hip circles:    1 x 10 (B) (CW/CCW)  HS curls  1 x 10 (B)  Hip ABD  1 x 10 (B)  Hip FLX  1 x 10  Hip EXT (not completed this session)  1 x 10 (B)  Standing  (B UE support)  Standing Bicycle kicks:  1 x 10 (B)  Heel Raise:  20   Horizontal shoulder ABD/ADD: 20 x Closed hands  Shoulder ABD/ADD: 20 x closed hands  Shoulder FLX/EXT: 20 closed hand  Shoulder IR/ER: 20 Closed hands  Floating in water post therapy session for spinal decompression.     Education and discussion on HEP and treatment regarding the benefits related to current condition, POC, pathophysiology, and precautions    Access Code: [email or web address]  URL: [email or web address]  Date: 2025  Prepared by: Kin Suazo    Exercises  - Seated Flexion Stretch with Swiss Ball  - 1 x daily - 7 x weekly " - 1 sets - 10 reps  - Child's Pose Stretch  - 1 x daily - 7 x weekly - 1 sets - 10 reps - 5s hold  - Seated Piriformis Stretch with Trunk Bend  - 1 x daily - 7 x weekly - 1 sets - 10 reps - 5s hold  - Seated Hamstring Stretch  - 1 x daily - 7 x weekly - 3 sets - 10 reps - 5s hold

## 2025-06-12 ENCOUNTER — APPOINTMENT (OUTPATIENT)
Dept: BEHAVIORAL HEALTH | Facility: CLINIC | Age: 40
End: 2025-06-12
Payer: MEDICAID

## 2025-06-12 DIAGNOSIS — F41.1 GAD (GENERALIZED ANXIETY DISORDER): ICD-10-CM

## 2025-06-12 DIAGNOSIS — F43.10 PTSD (POST-TRAUMATIC STRESS DISORDER): ICD-10-CM

## 2025-06-12 PROCEDURE — 99214 OFFICE O/P EST MOD 30 MIN: CPT | Performed by: PSYCHIATRY & NEUROLOGY

## 2025-06-12 RX ORDER — DESVENLAFAXINE 50 MG/1
50 TABLET, EXTENDED RELEASE ORAL DAILY
Qty: 90 TABLET | Refills: 1 | Status: SHIPPED | OUTPATIENT
Start: 2025-06-12 | End: 2025-12-09

## 2025-06-12 ASSESSMENT — PATIENT HEALTH QUESTIONNAIRE - PHQ9
SUM OF ALL RESPONSES TO PHQ9 QUESTIONS 1 AND 2: 2
2. FEELING DOWN, DEPRESSED OR HOPELESS: SEVERAL DAYS
1. LITTLE INTEREST OR PLEASURE IN DOING THINGS: SEVERAL DAYS

## 2025-06-12 NOTE — PROGRESS NOTES
"Adult Ambulatory Psychiatry Progress Note    Pt is at home ([address] [facility] [address] )  Writer is at home office    Virtual or Telephone Consent    An interactive audio and video telecommunication system which permits real time communications between the patient (at the originating site) and provider (at the distant site) was utilized to provide this telehealth service.   Verbal consent was requested and obtained from Alanna Del Angel on this date, 06/12/25 for a telehealth visit.      Assessment/Plan     Impression:  Alanna Del Angel is a 39 y.o. nonbinary, domiciled alone, seeking disability who presents for follow up with CC of PTSD (Post-Traumatic Stress Disorder) and Anxiety.       Plan:   PTSD and anxiety - pristiq 50mg daily, c/w individual therapy, c/w med ed, psycho ed, supportive psychotherapy to build therapeutic alliance, f/u 3 months         Subjective     Chief Complaint: PTSD (Post-Traumatic Stress Disorder) and Anxiety  HPI:  Pt arrived on time. Mood \"pretty good\". They sometimes have some low mood. Denies SI. Anxiety is manageable. Denies panic attacks. Sleep is poor. Appetite is good. Taking medicines as prescribed. Denies significant SE.         Objective   Mental Status Exam:  General Appearance: Well groomed, appropriate eye contact  Attitude/Behavior: Cooperative  Motor: No psychomotor agitation or retardation, no tremor or other abnormal movements  Speech: Normal rate, volume, prosody  Mood: \"pretty good\"  Affect: Euthymic, full-range  Thought Process: Linear, goal directed  Thought Associations: No loosening of associations  Thought Content: Normal  Perception: No perceptual abnormalities noted  Insight: Intact  Judgement: Intact    Vitals:  There were no vitals filed for this visit.    Current Medications:  Current Outpatient Medications on File Prior to Visit   Medication Sig Dispense Refill    blood pressure test kit-medium kit 1 kit if needed (POTS). 1 kit 0    Gi 0.35 mg " tablet TAKE ONE TABLET BY MOUTH DAILY (Patient not taking: Reported on 6/3/2025) 84 tablet 0    famotidine (Pepcid) 20 mg tablet TAKE ONE TABLET BY MOUTH TWO TIMES A  tablet 0    folic acid (Folvite) 400 mcg tablet Take by mouth once daily.      loratadine (Claritin Reditabs) 10 mg disintegrating tablet Take 1 tablet (10 mg) by mouth 2 times a day. 180 tablet 0    norethindrone (Micronor) 0.35 mg tablet Take 1 tablet (0.35 mg) by mouth once daily.      propranolol (Inderal) 10 mg tablet TAKE TWO TABLETS BY MOUTH TWO TIMES A  tablet 0    valACYclovir (Valtrex) 500 mg tablet Take 1 tablet (500 mg) by mouth once daily. 90 tablet 3    wheat dextrin (Benefiber Healthy Shape) 5 gram/7.4 gram powder Take 1 teaspoon daily 248 g 1    [DISCONTINUED] desvenlafaxine (Pristiq) 50 mg 24 hr tablet Take 1 tablet (50 mg) by mouth once daily. 90 tablet 1    [DISCONTINUED] propranolol (Inderal) 10 mg tablet TAKE TWO TABLETS BY MOUTH TWO TIMES A  tablet 0     No current facility-administered medications on file prior to visit.       Lab Review:   No visits with results within 2 Month(s) from this visit.   Latest known visit with results is:   Lab on 01/08/2025   Component Date Value    Collection Duration 01/08/2025 24     Urine Volume 01/08/2025 1700     Creatinine, 24 HR, U 01/08/2025 1139     Creatinine Concentration* 01/08/2025 67     N-Methylhistamine, 24 Hr 01/08/2025 101     Tryptase 01/10/2025 6.5     Collection Duration 01/08/2025 24     Urine Volume 01/08/2025 1700     Creatinine, 24 HR, U 01/08/2025 1122     Creatinine Concentration* 01/08/2025 66     2,3-dinor 11B-Prostaglan* 01/08/2025 539     Copper, 24H Urine 01/08/2025 9     Copper/Creat Ratio, Urine 01/08/2025 8     Copper, Urine 01/08/2025 5     Creatinine,U 01/08/2025 0.65     Metanephrines, Urine Int* 01/08/2025 See Note     Metanephrines, Urine 01/08/2025 54     Metanephrines, 24H Urine 01/08/2025 92     Metanephrine, Urine - ra* 01/08/2025 82      Normetanephrine, Urine 01/08/2025 152     Normetanephrine, 24H Uri* 01/08/2025 258     Normetanephrine, Urine -* 01/08/2025 230     Creatinine, Urine - per * 01/08/2025 66     Creatinine, 24H Ur 01/08/2025 1122     Total Volume 01/08/2025 1700     Collection Interval, Ur 01/08/2025 24     Organic Acids, Urine Int* 01/08/2025 Normal     Lactic Acid, Urine 01/08/2025 26     Pyruvic Acid, Urine 01/08/2025 14     Succinic Acid, Urine 01/08/2025 5     Fumaric Acid, Urine 01/08/2025 Not Detected     2-Ketoglutaric Acid, Uri* 01/08/2025 13     Methylmalonic Acid, Urine 01/08/2025 Not Detected     3-OH-Butyric Acid, Urine 01/08/2025 2     Acetoacetic Acid, Urine 01/08/2025 Not Detected     2-Keto-3-methylvaleric A* 01/08/2025 Not Detected     2-Ketoisocaproic Acid, U* 01/08/2025 Not Detected     2-Ketoisovaleric Acid, U* 01/08/2025 Not Detected     Ethylmalonic Acid, Urine 01/08/2025 2     Adipic Acid, Urine 01/08/2025 2     Suberic Acid, Urine 01/08/2025 1     Sebacic Acid, Urine 01/08/2025 Not Detected     6-UY-waqskdppvktg Acid, * 01/08/2025 12     4-GU-ywrngzvyzhgt Acid, * 01/08/2025 Not Detected     6-PL-gvxqydqhcggtq Acid,* 01/08/2025 Not Detected     Succinylacetone, Urine 01/08/2025 Not Detected     Creatinine, Urine 01/08/2025 174     Porphobilinogen, Urine 01/08/2025 0.6     Porphobilinogen, 24H Uri* 01/08/2025 1.0     Aminolevulinic Acid, Uri* 01/08/2025 10     Aminolevulinic Acid, Uri* 01/08/2025 17     Creatinine, Urine - per * 01/08/2025 66     Creatinine, 24H Ur 01/08/2025 1122     Total Volume 01/08/2025 1700     Collection Interval, Ur 01/08/2025 24     Vitamin B12 01/08/2025 465     Anti-SSA 01/08/2025 <0.2     Anti-SSB 01/08/2025 <0.2     Glucose 01/08/2025 97     Sodium 01/08/2025 137     Potassium 01/08/2025 4.4     Chloride 01/08/2025 106     Bicarbonate 01/08/2025 22     Anion Gap 01/08/2025 13     Urea Nitrogen 01/08/2025 13     Creatinine 01/08/2025 0.78     eGFR 01/08/2025 >90     Calcium  01/08/2025 8.8     Albumin 01/08/2025 4.0     Alkaline Phosphatase 01/08/2025 74     Total Protein 01/08/2025 6.6     AST 01/08/2025 17     Bilirubin, Total 01/08/2025 0.2     ALT 01/08/2025 13     Total Protein, Urine Ran* 01/08/2025 12     Albumin % 01/08/2025 27.3     Alpha 1 Globulin % 01/08/2025 10.4     Alpha 2 Globulin % 01/08/2025 18.0     Beta Globulin % 01/08/2025 25.5     Gamma Globulin % 01/08/2025 18.8     Urine Electrophoresis Co* 01/08/2025 Normal.         Path Review-Urine Protei* 01/08/2025 Reviewed and approved by CELESTINE HERRON on 1/10/25 at 9:14 PM.         Path Review - Urine Immu* 01/08/2025 Reviewed and approved by CELESTINE HERRON on 1/10/25 at 9:14 PM.         Immunofixation Comment 01/08/2025 No monoclonal protein detected by immunofixation.        Orders:  Diagnoses and all orders for this visit:  PTSD (post-traumatic stress disorder)  -     desvenlafaxine (Pristiq) 50 mg 24 hr tablet; Take 1 tablet (50 mg) by mouth once daily.  -     Follow Up In Psychiatry; Future  MICHEL (generalized anxiety disorder)  -     desvenlafaxine (Pristiq) 50 mg 24 hr tablet; Take 1 tablet (50 mg) by mouth once daily.          PHQ9  Over the past 2 weeks, how often have you been bothered by any of the following problems?  Little interest or pleasure in doing things: Several days  Feeling down, depressed, or hopeless: Several days    Risk Assessment:  Risk of harm to self: Low Risk -- Risk factors include: History of trauma or abuse  and Medical illness comorbidity  Protective factors include:Denies current suicidal ideation, Future-oriented talk , Willingness to seek help and support , Skills in problem solving, conflict resolution, and nonviolent handling of disputes, Cultural and [employer] beliefs that discourage suicide and support self-preservation , Access to a variety of clinical interventions , Receiving and engaged in care for mental, physical, and substance use disorders , History of adhering to treatment  recommendations and/or prescribed medication regimen , and Support through ongoing medical and mental healthcare relationships     Risk of harm to others: Low Risk - Risk factors include: No significant risk factors identified on screening. Protective factors include: Lack of known history of harm to others , Lack of known history of violent ideation , Lack of known access to firearms , Sense of community, availability/access to resources and support , Sense of optimism, hope , Interpersonal competence , Affect regulation , Sense of self-efficacy, internal locus of control , and Positive, pro-social family/peer network       Next Appointment:  Follow up in 14 weeks (on 9/18/2025).

## 2025-06-13 ENCOUNTER — ANESTHESIA EVENT (OUTPATIENT)
Dept: OPERATING ROOM | Facility: CLINIC | Age: 40
End: 2025-06-13
Payer: MEDICAID

## 2025-06-16 ENCOUNTER — ANESTHESIA (OUTPATIENT)
Dept: OPERATING ROOM | Facility: CLINIC | Age: 40
End: 2025-06-16
Payer: MEDICAID

## 2025-06-16 ENCOUNTER — HOSPITAL ENCOUNTER (OUTPATIENT)
Facility: CLINIC | Age: 40
Setting detail: OUTPATIENT SURGERY
Discharge: HOME | End: 2025-06-16
Attending: STUDENT IN AN ORGANIZED HEALTH CARE EDUCATION/TRAINING PROGRAM | Admitting: STUDENT IN AN ORGANIZED HEALTH CARE EDUCATION/TRAINING PROGRAM
Payer: MEDICAID

## 2025-06-16 VITALS
HEART RATE: 86 BPM | TEMPERATURE: 96.8 F | RESPIRATION RATE: 16 BRPM | SYSTOLIC BLOOD PRESSURE: 115 MMHG | HEIGHT: 63 IN | WEIGHT: 157.41 LBS | OXYGEN SATURATION: 96 % | DIASTOLIC BLOOD PRESSURE: 83 MMHG | BODY MASS INDEX: 27.89 KG/M2

## 2025-06-16 DIAGNOSIS — Z30.09 STERILIZATION CONSULT: ICD-10-CM

## 2025-06-16 DIAGNOSIS — A60.04 HERPES SIMPLEX VULVOVAGINITIS: Primary | ICD-10-CM

## 2025-06-16 PROBLEM — Z98.890 PONV (POSTOPERATIVE NAUSEA AND VOMITING): Status: ACTIVE | Noted: 2025-06-16

## 2025-06-16 PROBLEM — R11.2 PONV (POSTOPERATIVE NAUSEA AND VOMITING): Status: ACTIVE | Noted: 2025-06-16

## 2025-06-16 LAB — PREGNANCY TEST URINE, POC: NEGATIVE

## 2025-06-16 PROCEDURE — 7100000010 HC PHASE TWO TIME - EACH INCREMENTAL 1 MINUTE: Performed by: STUDENT IN AN ORGANIZED HEALTH CARE EDUCATION/TRAINING PROGRAM

## 2025-06-16 PROCEDURE — A58661 PR LAP,RMV  ADNEXAL STRUCTURE

## 2025-06-16 PROCEDURE — 2500000004 HC RX 250 GENERAL PHARMACY W/ HCPCS (ALT 636 FOR OP/ED): Mod: SE | Performed by: STUDENT IN AN ORGANIZED HEALTH CARE EDUCATION/TRAINING PROGRAM

## 2025-06-16 PROCEDURE — 2500000004 HC RX 250 GENERAL PHARMACY W/ HCPCS (ALT 636 FOR OP/ED): Mod: SE

## 2025-06-16 PROCEDURE — 88302 TISSUE EXAM BY PATHOLOGIST: CPT | Mod: TC,SUR,ELYLAB | Performed by: STUDENT IN AN ORGANIZED HEALTH CARE EDUCATION/TRAINING PROGRAM

## 2025-06-16 PROCEDURE — 3600000004 HC OR TIME - INITIAL BASE CHARGE - PROCEDURE LEVEL FOUR: Performed by: STUDENT IN AN ORGANIZED HEALTH CARE EDUCATION/TRAINING PROGRAM

## 2025-06-16 PROCEDURE — 2500000001 HC RX 250 WO HCPCS SELF ADMINISTERED DRUGS (ALT 637 FOR MEDICARE OP): Mod: SE | Performed by: STUDENT IN AN ORGANIZED HEALTH CARE EDUCATION/TRAINING PROGRAM

## 2025-06-16 PROCEDURE — A58661 PR LAP,RMV  ADNEXAL STRUCTURE: Performed by: ANESTHESIOLOGY

## 2025-06-16 PROCEDURE — 7100000002 HC RECOVERY ROOM TIME - EACH INCREMENTAL 1 MINUTE: Performed by: STUDENT IN AN ORGANIZED HEALTH CARE EDUCATION/TRAINING PROGRAM

## 2025-06-16 PROCEDURE — 81025 URINE PREGNANCY TEST: CPT | Performed by: STUDENT IN AN ORGANIZED HEALTH CARE EDUCATION/TRAINING PROGRAM

## 2025-06-16 PROCEDURE — 3600000009 HC OR TIME - EACH INCREMENTAL 1 MINUTE - PROCEDURE LEVEL FOUR: Performed by: STUDENT IN AN ORGANIZED HEALTH CARE EDUCATION/TRAINING PROGRAM

## 2025-06-16 PROCEDURE — 2500000005 HC RX 250 GENERAL PHARMACY W/O HCPCS: Mod: SE

## 2025-06-16 PROCEDURE — 3700000001 HC GENERAL ANESTHESIA TIME - INITIAL BASE CHARGE: Performed by: STUDENT IN AN ORGANIZED HEALTH CARE EDUCATION/TRAINING PROGRAM

## 2025-06-16 PROCEDURE — 7100000001 HC RECOVERY ROOM TIME - INITIAL BASE CHARGE: Performed by: STUDENT IN AN ORGANIZED HEALTH CARE EDUCATION/TRAINING PROGRAM

## 2025-06-16 PROCEDURE — 2720000007 HC OR 272 NO HCPCS: Performed by: STUDENT IN AN ORGANIZED HEALTH CARE EDUCATION/TRAINING PROGRAM

## 2025-06-16 PROCEDURE — 2500000005 HC RX 250 GENERAL PHARMACY W/O HCPCS: Mod: SE | Performed by: STUDENT IN AN ORGANIZED HEALTH CARE EDUCATION/TRAINING PROGRAM

## 2025-06-16 PROCEDURE — 3700000002 HC GENERAL ANESTHESIA TIME - EACH INCREMENTAL 1 MINUTE: Performed by: STUDENT IN AN ORGANIZED HEALTH CARE EDUCATION/TRAINING PROGRAM

## 2025-06-16 PROCEDURE — 7100000009 HC PHASE TWO TIME - INITIAL BASE CHARGE: Performed by: STUDENT IN AN ORGANIZED HEALTH CARE EDUCATION/TRAINING PROGRAM

## 2025-06-16 PROCEDURE — 58661 LAPAROSCOPY REMOVE ADNEXA: CPT | Performed by: STUDENT IN AN ORGANIZED HEALTH CARE EDUCATION/TRAINING PROGRAM

## 2025-06-16 RX ORDER — METOCLOPRAMIDE HYDROCHLORIDE 5 MG/ML
10 INJECTION INTRAMUSCULAR; INTRAVENOUS ONCE AS NEEDED
Status: DISCONTINUED | OUTPATIENT
Start: 2025-06-16 | End: 2025-06-16 | Stop reason: HOSPADM

## 2025-06-16 RX ORDER — HYDROMORPHONE HYDROCHLORIDE 1 MG/ML
INJECTION, SOLUTION INTRAMUSCULAR; INTRAVENOUS; SUBCUTANEOUS AS NEEDED
Status: DISCONTINUED | OUTPATIENT
Start: 2025-06-16 | End: 2025-06-16

## 2025-06-16 RX ORDER — METHOCARBAMOL 100 MG/ML
INJECTION, SOLUTION INTRAMUSCULAR; INTRAVENOUS AS NEEDED
Status: DISCONTINUED | OUTPATIENT
Start: 2025-06-16 | End: 2025-06-16

## 2025-06-16 RX ORDER — SODIUM CHLORIDE, SODIUM LACTATE, POTASSIUM CHLORIDE, CALCIUM CHLORIDE 600; 310; 30; 20 MG/100ML; MG/100ML; MG/100ML; MG/100ML
100 INJECTION, SOLUTION INTRAVENOUS CONTINUOUS
Status: SHIPPED | OUTPATIENT
Start: 2025-06-16 | End: 2025-06-16

## 2025-06-16 RX ORDER — LIDOCAINE HCL/PF 100 MG/5ML
SYRINGE (ML) INTRAVENOUS AS NEEDED
Status: DISCONTINUED | OUTPATIENT
Start: 2025-06-16 | End: 2025-06-16

## 2025-06-16 RX ORDER — LIDOCAINE HYDROCHLORIDE 10 MG/ML
0.1 INJECTION, SOLUTION EPIDURAL; INFILTRATION; INTRACAUDAL; PERINEURAL ONCE
Status: DISCONTINUED | OUTPATIENT
Start: 2025-06-16 | End: 2025-06-16 | Stop reason: HOSPADM

## 2025-06-16 RX ORDER — ACETAMINOPHEN 325 MG/1
975 TABLET ORAL ONCE
Status: DISCONTINUED | OUTPATIENT
Start: 2025-06-16 | End: 2025-06-16 | Stop reason: HOSPADM

## 2025-06-16 RX ORDER — ONDANSETRON HYDROCHLORIDE 2 MG/ML
4 INJECTION, SOLUTION INTRAVENOUS ONCE AS NEEDED
Status: DISCONTINUED | OUTPATIENT
Start: 2025-06-16 | End: 2025-06-16 | Stop reason: HOSPADM

## 2025-06-16 RX ORDER — BUPIVACAINE HYDROCHLORIDE 5 MG/ML
INJECTION, SOLUTION EPIDURAL; INTRACAUDAL; PERINEURAL AS NEEDED
Status: DISCONTINUED | OUTPATIENT
Start: 2025-06-16 | End: 2025-06-16 | Stop reason: HOSPADM

## 2025-06-16 RX ORDER — SUCCINYLCHOLINE CHLORIDE 20 MG/ML
INJECTION INTRAMUSCULAR; INTRAVENOUS AS NEEDED
Status: DISCONTINUED | OUTPATIENT
Start: 2025-06-16 | End: 2025-06-16

## 2025-06-16 RX ORDER — MIDAZOLAM HYDROCHLORIDE 1 MG/ML
INJECTION, SOLUTION INTRAMUSCULAR; INTRAVENOUS AS NEEDED
Status: DISCONTINUED | OUTPATIENT
Start: 2025-06-16 | End: 2025-06-16

## 2025-06-16 RX ORDER — KETOROLAC TROMETHAMINE 30 MG/ML
INJECTION, SOLUTION INTRAMUSCULAR; INTRAVENOUS AS NEEDED
Status: DISCONTINUED | OUTPATIENT
Start: 2025-06-16 | End: 2025-06-16

## 2025-06-16 RX ORDER — IBUPROFEN 800 MG/1
800 TABLET, FILM COATED ORAL EVERY 8 HOURS PRN
Qty: 30 TABLET | Refills: 0 | Status: SHIPPED | OUTPATIENT
Start: 2025-06-16

## 2025-06-16 RX ORDER — GLYCOPYRROLATE 0.2 MG/ML
INJECTION INTRAMUSCULAR; INTRAVENOUS AS NEEDED
Status: DISCONTINUED | OUTPATIENT
Start: 2025-06-16 | End: 2025-06-16

## 2025-06-16 RX ORDER — ACETAMINOPHEN 500 MG
1000 TABLET ORAL EVERY 6 HOURS PRN
Qty: 30 TABLET | Refills: 0 | Status: SHIPPED | OUTPATIENT
Start: 2025-06-16

## 2025-06-16 RX ORDER — LIDOCAINE HYDROCHLORIDE 40 MG/ML
SOLUTION TOPICAL AS NEEDED
Status: DISCONTINUED | OUTPATIENT
Start: 2025-06-16 | End: 2025-06-16

## 2025-06-16 RX ORDER — HYDROMORPHONE HYDROCHLORIDE 1 MG/ML
0.2 INJECTION, SOLUTION INTRAMUSCULAR; INTRAVENOUS; SUBCUTANEOUS EVERY 5 MIN PRN
Status: DISCONTINUED | OUTPATIENT
Start: 2025-06-16 | End: 2025-06-16 | Stop reason: HOSPADM

## 2025-06-16 RX ORDER — PROPOFOL 10 MG/ML
INJECTION, EMULSION INTRAVENOUS AS NEEDED
Status: DISCONTINUED | OUTPATIENT
Start: 2025-06-16 | End: 2025-06-16

## 2025-06-16 RX ORDER — ACETAMINOPHEN 10 MG/ML
INJECTION, SOLUTION INTRAVENOUS AS NEEDED
Status: DISCONTINUED | OUTPATIENT
Start: 2025-06-16 | End: 2025-06-16

## 2025-06-16 RX ORDER — FENTANYL CITRATE 50 UG/ML
INJECTION, SOLUTION INTRAMUSCULAR; INTRAVENOUS AS NEEDED
Status: DISCONTINUED | OUTPATIENT
Start: 2025-06-16 | End: 2025-06-16

## 2025-06-16 RX ORDER — GABAPENTIN 300 MG/1
600 CAPSULE ORAL ONCE
Status: COMPLETED | OUTPATIENT
Start: 2025-06-16 | End: 2025-06-16

## 2025-06-16 RX ORDER — SODIUM CHLORIDE, SODIUM LACTATE, POTASSIUM CHLORIDE, CALCIUM CHLORIDE 600; 310; 30; 20 MG/100ML; MG/100ML; MG/100ML; MG/100ML
INJECTION, SOLUTION INTRAVENOUS CONTINUOUS PRN
Status: DISCONTINUED | OUTPATIENT
Start: 2025-06-16 | End: 2025-06-16

## 2025-06-16 RX ORDER — ONDANSETRON HYDROCHLORIDE 2 MG/ML
INJECTION, SOLUTION INTRAVENOUS AS NEEDED
Status: DISCONTINUED | OUTPATIENT
Start: 2025-06-16 | End: 2025-06-16

## 2025-06-16 RX ORDER — OXYCODONE HYDROCHLORIDE 5 MG/1
5 TABLET ORAL EVERY 4 HOURS PRN
Status: DISCONTINUED | OUTPATIENT
Start: 2025-06-16 | End: 2025-06-16 | Stop reason: HOSPADM

## 2025-06-16 RX ORDER — ALBUTEROL SULFATE 0.83 MG/ML
2.5 SOLUTION RESPIRATORY (INHALATION) ONCE AS NEEDED
Status: DISCONTINUED | OUTPATIENT
Start: 2025-06-16 | End: 2025-06-16 | Stop reason: HOSPADM

## 2025-06-16 RX ORDER — CELECOXIB 200 MG/1
400 CAPSULE ORAL ONCE
Status: DISCONTINUED | OUTPATIENT
Start: 2025-06-16 | End: 2025-06-16 | Stop reason: HOSPADM

## 2025-06-16 RX ADMIN — FENTANYL CITRATE 50 MCG: 50 INJECTION, SOLUTION INTRAMUSCULAR; INTRAVENOUS at 07:47

## 2025-06-16 RX ADMIN — HYDROMORPHONE HYDROCHLORIDE 0.5 MG: 1 INJECTION, SOLUTION INTRAMUSCULAR; INTRAVENOUS; SUBCUTANEOUS at 07:58

## 2025-06-16 RX ADMIN — METHOCARBAMOL 500 MG: 100 INJECTION, SOLUTION INTRAMUSCULAR; INTRAVENOUS at 08:10

## 2025-06-16 RX ADMIN — SODIUM CHLORIDE, POTASSIUM CHLORIDE, SODIUM LACTATE AND CALCIUM CHLORIDE: 600; 310; 30; 20 INJECTION, SOLUTION INTRAVENOUS at 07:30

## 2025-06-16 RX ADMIN — DEXAMETHASONE SODIUM PHOSPHATE 4 MG: 4 INJECTION, SOLUTION INTRA-ARTICULAR; INTRALESIONAL; INTRAMUSCULAR; INTRAVENOUS; SOFT TISSUE at 07:47

## 2025-06-16 RX ADMIN — SUCCINYLCHOLINE CHLORIDE 60 MG: 20 INJECTION, SOLUTION INTRAMUSCULAR; INTRAVENOUS at 07:39

## 2025-06-16 RX ADMIN — FENTANYL CITRATE 50 MCG: 50 INJECTION, SOLUTION INTRAMUSCULAR; INTRAVENOUS at 07:39

## 2025-06-16 RX ADMIN — KETOROLAC TROMETHAMINE 30 MG: 30 INJECTION, SOLUTION INTRAMUSCULAR; INTRAVENOUS at 08:15

## 2025-06-16 RX ADMIN — GABAPENTIN 300 MG: 300 CAPSULE ORAL at 07:20

## 2025-06-16 RX ADMIN — LIDOCAINE HYDROCHLORIDE 40 MG: 20 INJECTION INTRAVENOUS at 07:39

## 2025-06-16 RX ADMIN — PROPOFOL 200 MG: 10 INJECTION, EMULSION INTRAVENOUS at 07:39

## 2025-06-16 RX ADMIN — GLYCOPYRROLATE 0.1 MG: 0.2 INJECTION, SOLUTION INTRAMUSCULAR; INTRAVENOUS at 07:52

## 2025-06-16 RX ADMIN — GLYCOPYRROLATE 0.1 MG: 0.2 INJECTION, SOLUTION INTRAMUSCULAR; INTRAVENOUS at 07:30

## 2025-06-16 RX ADMIN — LIDOCAINE HYDROCHLORIDE 4 ML: 40 SOLUTION TOPICAL at 07:40

## 2025-06-16 RX ADMIN — ACETAMINOPHEN 1000 MG: 10 INJECTION INTRAVENOUS at 08:09

## 2025-06-16 RX ADMIN — PROPOFOL 60 MCG/KG/MIN: 10 INJECTION, EMULSION INTRAVENOUS at 07:43

## 2025-06-16 RX ADMIN — ONDANSETRON 4 MG: 2 INJECTION INTRAMUSCULAR; INTRAVENOUS at 08:15

## 2025-06-16 RX ADMIN — MIDAZOLAM 2 MG: 1 INJECTION INTRAMUSCULAR; INTRAVENOUS at 07:30

## 2025-06-16 RX ADMIN — METHOCARBAMOL 500 MG: 100 INJECTION, SOLUTION INTRAMUSCULAR; INTRAVENOUS at 07:47

## 2025-06-16 ASSESSMENT — COLUMBIA-SUICIDE SEVERITY RATING SCALE - C-SSRS
2. HAVE YOU ACTUALLY HAD ANY THOUGHTS OF KILLING YOURSELF?: NO
1. IN THE PAST MONTH, HAVE YOU WISHED YOU WERE DEAD OR WISHED YOU COULD GO TO SLEEP AND NOT WAKE UP?: NO
6. HAVE YOU EVER DONE ANYTHING, STARTED TO DO ANYTHING, OR PREPARED TO DO ANYTHING TO END YOUR LIFE?: NO

## 2025-06-16 ASSESSMENT — PAIN - FUNCTIONAL ASSESSMENT
PAIN_FUNCTIONAL_ASSESSMENT: 0-10

## 2025-06-16 ASSESSMENT — PAIN SCALES - GENERAL
PAINLEVEL_OUTOF10: 0 - NO PAIN
PAINLEVEL_OUTOF10: 6
PAINLEVEL_OUTOF10: 0 - NO PAIN

## 2025-06-16 NOTE — H&P
"History Of Present Illness  Alanna Del Angel is a 39 y.o. adult presenting for tubal sterilization.     Past Medical History  Medical History[1]    Surgical History  Surgical History[2]     Social History  Alanna reports that Alanna has been smoking cigarettes. Alanna started smoking about 11 years ago. Alanna has a 2.5 pack-year smoking history. Alanna has never used smokeless tobacco. Alanna reports that Alanna does not currently use alcohol. Alanna reports current drug use. Frequency: 7.00 times per week. Drug: Marijuana.    Family History  Family History[3]     Allergies  Adhesive and Codeine    Review of Systems   All other systems reviewed and are negative.       Physical Exam  Constitutional:       General: Alanna is not in acute distress.     Appearance: Normal appearance.   HENT:      Head: Normocephalic.   Cardiovascular:      Rate and Rhythm: Normal rate.   Pulmonary:      Effort: Pulmonary effort is normal. No respiratory distress.   Skin:     General: Skin is warm and dry.   Neurological:      Mental Status: Alanna is alert and oriented to person, place, and time.   Psychiatric:         Mood and Affect: Mood normal.         Behavior: Behavior normal.         Thought Content: Thought content normal.         Judgment: Judgment normal.          Last Recorded Vitals  Blood pressure 115/73, pulse 82, temperature 36.2 °C (97.2 °F), temperature source Skin, resp. rate 16, height 1.6 m (5' 3\"), weight 71.4 kg (157 lb 6.5 oz), last menstrual period 06/02/2025, SpO2 96%.         Assessment & Plan  Sterilization consult  To OR for planned procedure  Discharge home post operatively      Cam Zhu MD         [1]   Past Medical History:  Diagnosis Date    Anemia 2005    Anxiety 1992    Asthma 2000    Depression 1990    Aspen-Danlos syndrome (HHS-HCC)     Fibromyalgia     GERD (gastroesophageal reflux disease) 2001    Herpes 2005    HPV (human papilloma virus) infection 2004 "    Scoliosis 2008   [2]   Past Surgical History:  Procedure Laterality Date    COLPOSCOPY  ?    WISDOM TOOTH EXTRACTION  1998   [3]   Family History  Problem Relation Name Age of Onset    Anesthesia related problems Mother Merna Garcia     Depression Mother Merna Garcia     Diabetes Mother Merna Garcia     Hyperlipidemia Mother Merna Garcia     Mental illness Mother Merna Garcia     Vision loss Mother Merna Garcia     Alcohol abuse Father Jackson Del Angel     Depression Father Jackson Del Angel     Drug abuse Father Jackson Del Angel     Heart disease Father Jackson Del Angel     Hypertension Father Jackson Del Angel     Mental illness Father Jackson Del Angel     Asthma Brother Jackson II     Mental illness Brother Jackson II     Asthma Brother Luis     Birth defects Brother Luis     Intellectual Disability Brother Luis     Alcohol abuse Maternal Grandmother Abuela     Alcohol abuse Maternal Grandfather Bernabe     Cancer Maternal Grandfather Bernabe     Alcohol abuse Paternal Grandmother Schalupe     Alcohol abuse Paternal Grandfather Johann     Breast cancer Neg Hx      Ovarian cancer Neg Hx      Colon cancer Neg Hx

## 2025-06-16 NOTE — OP NOTE
Date: 2025  OR Location: [facility]    Name: Alanna Del Angel, : 1985, Age: 39 y.o., MRN: [id number], Sex: adult    Diagnosis  Pre-op Diagnosis      * Sterilization consult [Z30.09] Post-op Diagnosis     * Sterilization consult [Z30.09]     Procedures  SALPINGECTOMY, LAPAROSCOPIC  49381 - MD LAPAROSCOPY W/RMVL ADNEXAL STRUCTURES      Surgeons      * Cam Zhu - Primary    Resident/Fellow/Other Assistant:  Surgeons and Role:  * No surgeons found with a matching role *    Staff:   Circulator: Markus Shields Person: Janiya    Anesthesia Staff: Anesthesiologist: Boni Narvaez MD  C-AA: HAYDEE Richard    Procedure Summary  Anesthesia: Anesthesia type not filed in the log.  ASA: II  Estimated Blood Loss: 5mL  Intra-op Medications:   Administrations occurring from 0723 to 0838 on 25:   Medication Name Total Dose   bupivacaine PF (Marcaine) 0.5 % (5 mg/mL) injection 10 mL   surgical lubricant gel 1 Application   acetaminophen (Ofirmev) injection 1,000 mg   dexAMETHasone (Decadron) 4 mg/mL IV Syringe 2 mL 4 mg   fentaNYL (Sublimaze) injection 50 mcg/mL 100 mcg   glycopyrrolate (Robinul) injection 0.2 mg   HYDROmorphone (Dilaudid) injection 1 mg/mL 0.5 mg   ketorolac (Toradol) injection 30 mg 30 mg   LR infusion Cannot be calculated   lidocaine (cardiac) injection 2% prefilled syringe 40 mg   lidocaine (LTA Kit) for intubation 4 mL   methocarbamol (Robaxin) injection 1,000 mg   midazolam (Versed) injection 1 mg/mL 2 mg   ondansetron (Zofran) 2 mg/mL injection 4 mg   propofol (Diprivan) injection 10 mg/mL 332.8 mg   succinylcholine (Anectine) 20 mg/mL injection 60 mg              Anesthesia Record               Intraprocedure I/O Totals          Output    Urine 20 mL    Est. Blood Loss 5 mL    Total Output 25 mL          Specimen:   ID Type Source Tests Collected by Time   1 : BILATERAL FALLOPIAN TUBES Tissue FALLOPIAN TUBE SALPINGECTOMY LEFT AND RIGHT SURGICAL PATHOLOGY EXAM Cam FRANKS  MD Audra 6/16/2025 0812                 Procedure Details:  The patient was seen in the preoperative area. The site of surgery was properly noted/marked if necessary per policy. The patient has been actively warmed in preoperative area. Preoperative antibiotics are not indicated. Venous thrombosis prophylaxis have been ordered including bilateral sequential compression devices    Findings: Normal appearing external female genitalia. Bimanual exam with normal sized uterus, no adnexal masses. Normal upper abdominal survey with atraumatic entry site. Normal appearing uterus, anterior and posterior cul de sac. Normal appearing tubes bilaterally. Left ovary normal appearing. Right ovary with approx 1-2cm cystic appearing structure consistent with prior ultrasound. No evidence of intra-abdominal adhesive disease or endometriosis.    The risks, benefits, and alternatives of the procedure (including, but not limited to bleeding, infection, damage to bowel, bladder, ureters, nerves, blood vessels and other organs) were discussed with the patient and informed consent was obtained.        The patient was brought to the operating room where a time out was performed and general endotracheal anesthesia was obtained without difficulty. Legs were placed in low lithotomy position in Hector stirrups, taking care not to hyperflex or hyperextend the hips or the knees. Arms were tucked at their sides and hands and elbows were padded and the chest was securely strapped.       They were prepped and draped in the normal sterile fashion and a mendez catheter was placed in the urinary bladder.      An incision was made in the umbilicus and entry to the abdomen was gained using 5mm trocar with direct visualization technique. The abdomen was insufflated after confirming intra-peritoneal entry. No visceral or vascular injury was noted. Additional trocars were placed under direct visualization as follows: one 5mm trocar in the left lower  quadrant and one supra-pubic 5mm trocar. 0.25% bupivacaine was injected at all planned port sites. A complete abdominopelvic survey was completed with the findings as above. The patient was placed in Trendelenburg and attention was turned to the pelvis. Left tube was grasped at the fibriated end and mesosalpinx was transected using the Ligasure energy device to the level of the cornu, where the tube was transected and handed off to pathology. This was repeated on the right side. Hemostasis was confirmed under decreased insufflation pressure and ports were removed under direct visualization. Skin was closed with 4-0 vicryl subcuticular stitch and dermabond superficially. Neves catheter was removed from the bladder.    All sponge, lap, and needle counts were correct.      Complications:  None; patient tolerated the procedure well.     Disposition: PACU - hemodynamically stable.  Condition: stable    Cam Zhu MD

## 2025-06-16 NOTE — ANESTHESIA PROCEDURE NOTES
Airway  Date/Time: 6/16/2025 7:40 AM  Reason: elective    Airway not difficult    Staffing  Performed: HAYDEE   Authorized by: Boni Narvaez MD    Performed by: HAYDEE Richard  Patient location during procedure: OR    Patient Condition  Indications for airway management: anesthesia and airway protection  Patient position: sniffing  Planned trial extubation  Sedation level: deep     Final Airway Details   Preoxygenated: yes  Final airway type: endotracheal airway  Successful airway: ETT  Cuffed: yes   Successful intubation technique: direct laryngoscopy  Endotracheal tube insertion site: oral  Blade: Oj  Blade size: #3  ETT size (mm): 6.5  Cormack-Lehane Classification: grade IIa - partial view of glottis  Placement verified by: chest auscultation   Measured from: teeth  ETT to teeth (cm): 22  Number of attempts at approach: 1    Additional Comments  Lips/teeth in preanesthetic condition. LTA kit used.

## 2025-06-16 NOTE — ANESTHESIA PREPROCEDURE EVALUATION
Patient: Alanna Del Angel    Procedure Information       Date/Time: 06/16/25 0723    Procedure: SALPINGECTOMY, LAPAROSCOPIC (Bilateral)    Location: [facility] OR [facility] / [facility]    Surgeons: Cam Zhu MD            Relevant Problems   Anesthesia  Nausea with ketamine treatments (treated with zofran), no issues with anesthesia      Cardiac   (+) Mitral valve prolapse      Pulmonary   (+) Mild persistent asthma, uncomplicated (HHS-HCC)      Neuro   (+) Depressive disorder   (+) MIHCEL (generalized anxiety disorder)   (+) Major depressive disorder, recurrent, moderate   (+) PTSD (post-traumatic stress disorder)      GI   (+) Other irritable bowel syndrome      Liver   (+) Non-alcoholic fatty liver disease      Hematology   (+) Anemia   (+) Pernicious anemia      Musculoskeletal   (+) Fibromyalgia   (+) Scoliosis      HEENT   (+) Sinus congestion      ID   (+) Disease due to severe acute respiratory syndrome coronavirus 2 (SARS-CoV-2)   (+) Genital herpes   (+) Herpes       Clinical information reviewed:   Tobacco  Allergies  Meds  Problems  Med Hx  Surg Hx  OB Status    Fam Hx  Soc Hx        NPO Detail:  NPO/Void Status  Carbohydrate Drink Given Prior to Surgery? : N  Date of Last Liquid: 06/16/25  Time of Last Liquid: 0300 (sip h2o with meds)  Date of Last Solid: 06/15/25  Time of Last Solid: 2200  Last Intake Type: Clear fluids  Time of Last Void: 0650         Physical Exam    Airway  Mallampati: III  Mouth opening: 3 or more finger widths     Cardiovascular - normal exam   Dental - normal exam     Pulmonary - normal exam   Abdominal            Anesthesia Plan    History of general anesthesia?: yes  History of complications of general anesthesia?: no    ASA 2     general     intravenous induction   Anesthetic plan and risks discussed with patient.    Plan discussed with CRNA and attending.

## 2025-06-16 NOTE — ANESTHESIA POSTPROCEDURE EVALUATION
Patient: Alanna Del Angel    Procedure Summary       Date: 06/16/25 Room / Location: [facility] OR 03 [facility]    Anesthesia Start: 0730 Anesthesia Stop: 0834    Procedure: SALPINGECTOMY, LAPAROSCOPIC (Bilateral: Abdomen) Diagnosis:       Sterilization consult      (Sterilization consult [Z30.09])    Surgeons: Cam Zhu MD Responsible Provider: Boni Narvaez MD    Anesthesia Type: general ASA Status: 2            Anesthesia Type: general    Vitals Value Taken Time   /58 06/16/25 08:57   Temp 36 °C (96.8 °F) 06/16/25 08:34   Pulse 94 06/16/25 08:57   Resp 16 06/16/25 08:57   SpO2 95 % 06/16/25 08:57       Anesthesia Post Evaluation    Patient location during evaluation: PACU  Patient participation: complete - patient participated  Level of consciousness: awake  Pain management: satisfactory to patient  Multimodal analgesia pain management approach  Airway patency: patent  Cardiovascular status: acceptable  Respiratory status: acceptable  Hydration status: acceptable  Postoperative Nausea and Vomiting: none  Comments: Did well        No notable events documented.

## 2025-06-16 NOTE — DISCHARGE INSTRUCTIONS
Below you will find some general instructions for your care after your surgery. Please let me know if you have any questions. Myself or a partner can be reached 24-hours per day at [phone] ([place] Office) or [phone] ([place] Office).     Post-operative guidelines    Call the office if you:   run a fever greater than 100.5 degrees for more than 24 hours   experience vaginal bleeding greater in amount than a regular period (soaking more than 1 pad per hour x 2 hours)   have an unusual vaginal discharge   are persistently nauseated or have vomiting   have difficulty urinating   have increasing pain or pain that is not controlled by the pain medication provided to you      Pain Medication  We recommend that you alternate the use of ibuprofen 600 mg every 6 hours, with Tylenol 1000 mg every 6 hours.  Thus, you will take a set of pills every 3 hours. You can also take both tablets together at the same time every 6 hours if your prefer. That way you will always have some pain medication in your system providing relief.  After the first few days, you can begin to use these medications only as needed.   Use oxycodone 5 mg when pain becomes more intense.  You will notice more pain as you begin to be more active, which is normal.  However, note that oxycodone and other narcotics are very constipating.  If you do not require additional pain relief, it is best to avoid narcotic use.  Narcotic medication can cause nausea in some people.  If you experience nausea after using narcotics, consider taking 1 tablet of Zofran 30 minutes prior to taking oxycodone or another narcotic.  Keep in mind, anti-nausea medications are also constipating.  Some patients have shoulder or neck pain for a couple days after surgery. This is typically due to nerve pain caused by the gas we use to inflate the belly. Over the counter lidocaine patches can be used to help with this pain. The most common brand is SalonPas.      Constipation  If you are requiring narcotic medication, make sure you are using a stool softener or laxative.  Senokot.  Take 1 pill in the morning and 1 pill at night.  This is a gentle combination stool softener and laxative.  It does not work well unless you drink water.  If you are not having any movement with this dose, you can safely double the amount of Senokot you are taking to 2 pills in the morning and 2 pills at night.  After surgery, it is common to be constipated for several days.  Do not expect a bowel movement for 3-5 days after surgery.  If you have not yet had a bowel movement by day 4 after surgery you can consider taking over-the-counter MiraLax in the morning.  If this still has not produced a bowel movement, on day 5 you can consider adding to your regimen milk of magnesia.  Milk of magnesia is a very strong laxative.  It can cause quite a bit of stomach cramping.  It takes about 6-8 hours to work after you take the milk of magnesia.  If you are feeling “full” and close to a bowel movement, but just can't achieve a bowel movement, you might consider using a suppository or enema. These are also over-the-counter and helps evacuate the rectum.  If you are having significant discomfort from gas pain, you can take over-the-counter simethicone.  A common brand name is called Gas-X.    Activity  We encourage you to walk at least 30 minutes every day, broken up in to 10 minute intervals.  You may be walking slowly, but it is important to move your body after surgery.  Pelvic rest means nothing in the vagina, including no intercourse, tampons, soaking in hot tubs or the pool, douching.  If you have been advised to follow pelvic rest, please follow these guidelines. These restrictions last for 6 weeks.   If you have been advised not to lift greater than 10-20 lb after surgery, your exercise should only be limited to walking.  Lifting heavy items increases pressure on the pelvic floor.  Limited bending  and twisting is okay however is likely to be painful for you. If you have had a hysterectomy, these restrictions last for 6 weeks. If you have had a laparoscopic or robotic procedure (a procedure completed through small incisions) but not a hysterectomy, these restrictions last for 4 weeks.     Bleeding  If you have had hysterectomy, you may have spotting or light bleeding for 6-9 weeks after surgery.  You may notice that bleeding becomes a bit heavier as you become more active.  This is nothing to worry about.  However, if you begin bleeding briskly (requiring multiple pad changes in the day or soaking a full pad within 1-2 hours) please let the office know so you can be evaluated.  If you have had surgery that did not include hysterectomy, your menstrual cycle may be somewhat unpredictable for 1-3 cycles after surgery.  You may have your menstrual cycle immediately after surgery or may skip a full cycle.  This is also within the range of normal.    Dressings/incisions  If you have had a vaginal or VNOTES hysterectomy, your only incision is in the vagina.  If you have abdominal incisions, you will likely have small bandages called Steri-Strips on your smaller incisions.  They should fall off on their own within 7-14 days.  If they fall off sooner than 7 days, nothing to worry about.  If they are still on after 1 week, go ahead and remove the Steri-Strips at that point.  You can get the Steri-Strips wet in the shower.  Just do not scrub into the incisions.  Soap up the upper part of your abdomen and let the soapy water run down.  Once the Steri-Strips are off, continue to keep the incisions clean and dry.  There is dissolvable suture under the skin of your incisions.  It is not uncommon to notice small bits of suture coming free from the incisions after a few weeks.  Sometimes it is difficult to care for the belly button incision.  If you become concerned about the belly button incision or it is not easy to keep  clean, you can use a cotton ball soaked in hydrogen peroxide to gently wipe the belly button incision once a day.  Please call the office if you note rapidly enlarging redness around the belly button or thick white, opaque yellow or green discharge from the belly button.  Clear or straw-colored drainage can be present and is not concerning.  You can always send a photograph of your belly button incision through the patient portal if you are concerned about how it looks.  Because the belly button has the largest incision, we use more layers of suture in this area.  You may feel a hard knot under the skin of your belly button incision at the top and bottom of the incision.  This is most likely the suture knot of the bottom layer of suture under your belly button.  This area may be slightly tender, but should not be extremely painful.  These sutures will dissolve slowly over time (3 months).    Best,  Dr. Zhu    May have Tylenol after: 2pm    May have Ibuprofen/advil/motrin/aleve after: 230pm

## 2025-06-17 ENCOUNTER — APPOINTMENT (OUTPATIENT)
Dept: PHYSICAL THERAPY | Facility: CLINIC | Age: 40
End: 2025-06-17
Payer: MEDICAID

## 2025-06-17 DIAGNOSIS — R53.1 WEAKNESS: Primary | ICD-10-CM

## 2025-06-17 DIAGNOSIS — Z74.09 OTHER REDUCED MOBILITY: ICD-10-CM

## 2025-06-17 DIAGNOSIS — Z74.09 DECREASED FUNCTIONAL MOBILITY AND ENDURANCE: ICD-10-CM

## 2025-06-17 DIAGNOSIS — G90.A POSTURAL ORTHOSTATIC TACHYCARDIA SYNDROME (POTS): ICD-10-CM

## 2025-06-23 LAB
LABORATORY COMMENT REPORT: NORMAL
PATH REPORT.FINAL DX SPEC: NORMAL
PATH REPORT.GROSS SPEC: NORMAL
PATH REPORT.RELEVANT HX SPEC: NORMAL
PATH REPORT.TOTAL CANCER: NORMAL

## 2025-06-24 ENCOUNTER — APPOINTMENT (OUTPATIENT)
Dept: PHYSICAL THERAPY | Facility: CLINIC | Age: 40
End: 2025-06-24
Payer: MEDICAID

## 2025-06-24 DIAGNOSIS — Z74.09 DECREASED FUNCTIONAL MOBILITY AND ENDURANCE: ICD-10-CM

## 2025-06-24 DIAGNOSIS — G90.A POSTURAL ORTHOSTATIC TACHYCARDIA SYNDROME (POTS): ICD-10-CM

## 2025-06-24 DIAGNOSIS — R53.1 WEAKNESS: Primary | ICD-10-CM

## 2025-06-24 DIAGNOSIS — Z74.09 OTHER REDUCED MOBILITY: ICD-10-CM

## 2025-06-24 PROCEDURE — 97113 AQUATIC THERAPY/EXERCISES: CPT | Mod: GP

## 2025-06-24 NOTE — PROGRESS NOTES
Physical Therapy Treatment      Patient Name: Alanna Del Angel  MRN: [id number]  Today's Date: 6/24/2025  Visit #5  Time Calculation  Start Time: 0907  Stop Time: 0930  Time Calculation (min): 23 min    Insurance:  2025 [employer] AUTH AFTER 30 VS VERNELL YR     Assessment:  Pt had good tolerance to the addition of ankle weights last aquatic session. Rest breaks, throughout the session, secondary to post exercise fatigue after last session.  Occasional rest breaks throughout session.  Pt would continue to benefit from aquatic therapy for POTS related symptoms, endurance deficits and generalized weakness.    Patient's response to session: No change in pain, Increased ROM/joint mobility, Increase motor control, and Increased knowledge and understanding  Aquatic PT is required due to, buoyancy of water reduced weight bearing and decreased LE and spinal loading, allowing for more freedom of movement and promotes improved ability to perform functional tasks.  The viscosity of water which is more than 700x that of air, allowed increased reaction time, in turn allowing advanced balance activities to be safely performed and allow patient to be challenged beyond limited of stability without fear of falling; provides the opportunity to work on balance in a safe environment.  The hydrostatic pressure of water facilitates the reduction of edema in the lower extremities, allowing for greater ease of movement.  Aquatics d/t cardio vascular benefits including: improved cardiovascular efficiency including increased stroke volume, decreased HR, and decreased blood pressure with increased cardiac output allowing patient to achieve cardiovascular training effect with less work load.   Water promotes a rapid return to movement after surgery due to a low impact environment.  Aquatics to support upright posture during postural retraining and strengthening.   Aquatics allow patient to work on gait with less assistance or without assistive  "device improving posture and ease of gait training/conditioning.     Plan:  Continue per POC. Focus on dynamic core stabilization, hip and quad strength, as well as inc endurance.     When pt ready, planning on hybrid 2x/wk land and aquatic   Current Problem:   1. Weakness        2. Postural orthostatic tachycardia syndrome (POTS)        3. Other reduced mobility        4. Decreased functional mobility and endurance          Subjective   Pt reports, \"I had my barbara      Pain: Lower back, and both of my hips.        Objective       Treatments:  Therapeutic Exercise (56394):   minutes      Manual Therapy (51999):   minutes      Neuromuscular Re-education (63792):   minutes    Aquatic Therapy (06416):    Min  units  Forward walkin laps with current  Backwards walking.   Low back stretch at wall 30s 2x  Mini Squats: 2 x 10:  Lateral walking  2 x 10  Hip circles:    1 x 10 (B) (CW/CCW)  HS curls  1 x 10 (B)  Hip ABD  1 x 10 (B)  Hip FLX  1 x 10  Hip EXT (not completed this session)  1 x 10 (B)  Standing  (B UE support)  Standing Bicycle kicks:  1 x 10 (B)  Heel Raise:  20   Horizontal shoulder ABD/ADD: 20 x Closed hands  Shoulder ABD/ADD: 20 x closed hands  Shoulder FLX/EXT: 20 closed hand  Shoulder IR/ER: 20 Closed hands  Floating in water post therapy session for spinal decompression.     Education and discussion on HEP and treatment regarding the benefits related to current condition, POC, pathophysiology, and precautions    Access Code: [email or web address]  URL: [email or web address]  Date: 2025  Prepared by: Kin Suazo    Exercises  - Seated Flexion Stretch with Swiss Ball  - 1 x daily - 7 x weekly - 1 sets - 10 reps  - Child's Pose Stretch  - 1 x daily - 7 x weekly - 1 sets - 10 reps - 5s hold  - Seated Piriformis Stretch with Trunk Bend  - 1 x daily - 7 x weekly - 1 sets - 10 reps - 5s hold  - Seated Hamstring Stretch  - 1 x daily - 7 x weekly - 3 sets - 10 reps - 5s hold  "

## 2025-07-01 ENCOUNTER — APPOINTMENT (OUTPATIENT)
Dept: PHYSICAL THERAPY | Facility: CLINIC | Age: 40
End: 2025-07-01
Payer: MEDICAID

## 2025-07-01 DIAGNOSIS — R53.1 WEAKNESS: Primary | ICD-10-CM

## 2025-07-01 DIAGNOSIS — Z74.09 OTHER REDUCED MOBILITY: ICD-10-CM

## 2025-07-01 DIAGNOSIS — G90.A POSTURAL ORTHOSTATIC TACHYCARDIA SYNDROME (POTS): ICD-10-CM

## 2025-07-01 DIAGNOSIS — Z74.09 DECREASED FUNCTIONAL MOBILITY AND ENDURANCE: ICD-10-CM

## 2025-07-01 PROCEDURE — 97113 AQUATIC THERAPY/EXERCISES: CPT | Mod: GP

## 2025-07-01 NOTE — PROGRESS NOTES
Physical Therapy Treatment      Patient Name: Alanna Del Angel  MRN: [id number]  Today's Date: 7/1/2025  Visit #6  Time Calculation  Start Time: 0900  Stop Time: 0928  Time Calculation (min): 28 min    Insurance:  2025 [employer] AUTH AFTER 30 VS VERNELL YR     Assessment:  Pt had good tolerance to the addition of ankle weights last aquatic session, continued with 2# ankle weights this session.  Brief rest breaks periodically throughout the session. Updated HEP this session, including hip and low back stretches to help reduce tissue irritation outside of aquatic setting.  [facility] [place] therapy session[place] [facility] [place]  Pt would continue to benefit from aquatic therapy for POTS related symptoms, endurance deficits and generalized weakness.     Patient's response to session: No change in pain, Increased ROM/joint mobility, Increase motor control, and Increased knowledge and understanding  Aquatic PT is required due to, buoyancy of water reduced weight bearing and decreased LE and spinal loading, allowing for more freedom of movement and promotes improved ability to perform functional tasks.  The viscosity of water which is more than 700x that of air, allowed increased reaction time, in turn allowing advanced balance activities to be safely performed and allow patient to be challenged beyond limited of stability without fear of falling; provides the opportunity to work on balance in a safe environment.  The hydrostatic pressure of water facilitates the reduction of edema in the lower extremities, allowing for greater ease of movement.  Aquatics d/t cardio vascular benefits including: improved cardiovascular efficiency including increased stroke volume, decreased HR, and decreased blood pressure with increased cardiac output allowing patient to achieve cardiovascular training effect with less work load.   Water promotes a rapid return to movement after surgery due to a low impact  "environment.  Aquatics to support upright posture during postural retraining and strengthening.   Aquatics allow patient to work on gait with less assistance or without assistive device improving posture and ease of gait training/conditioning.     Plan:  Continue per POC. Focus on dynamic core stabilization, hip and quad strength, as well as inc endurance.     When pt ready, planning on hybrid 2x/wk land and aquatic   Current Problem:   1. Weakness        2. Postural orthostatic tachycardia syndrome (POTS)        3. Other reduced mobility        4. Decreased functional mobility and endurance          Subjective   Pt reports, \"I was okay after last session, I was tired for about a day, but other than that I was okay.\"      Pain: Lower back, and both of my hips.        Objective       Treatments:  Therapeutic Exercise (73536):   minutes      Manual Therapy (36308):   minutes      Neuromuscular Re-education (28921):   minutes    Aquatic Therapy (23804):   28 Min 2 units  All exercises completed with 2# ankle weights  Forward walkin laps with current  Backwards walking.   Low back stretch at wall 30s 2x  Mini Squats: 2 x 10:  Lateral walking  2 x 10  Hip circles:    2 x 10 (B) (CW/CCW)  HS curls  2 x 10 (B)  Hip ABD  2 x 10 (B)  Hip FLX  2 x 10  Hip EXT   2 x 10 (B)  Standing  (B UE support)  Standing Bicycle kicks:  2 x 10 (B)  Heel Raise:  20   Horizontal shoulder ABD/ADD: 20 x Blue dumbbells  Shoulder ABD/ADD: 20 x Blue dumbbells  Shoulder FLX/EXT: 20 Blue dumbbells  Shoulder IR/ER: 20 Blue dumbbells  Floating in water post therapy session for spinal decompression.     Education and discussion on HEP and treatment regarding the benefits related to current condition, POC, pathophysiology, and precautions    Access Code: [email or web address]  URL: [email or web address]  Date: 2025  Prepared by: Kin Suazo    Exercises  - Seated Flexion Stretch with Swiss Ball  - 1 x daily - 7 x weekly - 1 " sets - 10 reps  - Child's Pose Stretch  - 1 x daily - 7 x weekly - 1 sets - 10 reps - 5s hold  - Seated Piriformis Stretch with Trunk Bend  - 1 x daily - 7 x weekly - 1 sets - 10 reps - 5s hold  - Seated Hamstring Stretch  - 1 x daily - 7 x weekly - 3 sets - 10 reps - 5s hold

## 2025-07-07 ENCOUNTER — APPOINTMENT (OUTPATIENT)
Dept: OBSTETRICS AND GYNECOLOGY | Facility: CLINIC | Age: 40
End: 2025-07-07
Payer: MEDICAID

## 2025-07-07 VITALS
BODY MASS INDEX: 27.46 KG/M2 | SYSTOLIC BLOOD PRESSURE: 118 MMHG | DIASTOLIC BLOOD PRESSURE: 62 MMHG | HEIGHT: 63 IN | WEIGHT: 155 LBS

## 2025-07-07 DIAGNOSIS — Z98.890 POST-OPERATIVE STATE: Primary | ICD-10-CM

## 2025-07-07 PROCEDURE — 3008F BODY MASS INDEX DOCD: CPT | Performed by: STUDENT IN AN ORGANIZED HEALTH CARE EDUCATION/TRAINING PROGRAM

## 2025-07-07 PROCEDURE — 99024 POSTOP FOLLOW-UP VISIT: CPT | Performed by: STUDENT IN AN ORGANIZED HEALTH CARE EDUCATION/TRAINING PROGRAM

## 2025-07-07 ASSESSMENT — PAIN SCALES - GENERAL: PAINLEVEL_OUTOF10: 0-NO PAIN

## 2025-07-07 NOTE — PROGRESS NOTES
Subjective   Alanna is a 40 y.o.  nonbinary presenting for Post-op    Now s/p bilateral salpingectomy on 25. Doing well. No pain. Normal bowel and bladder function. Incisions healing well. No concerns.        Objective   Vitals:    25 0905   BP: 118/62     Physical Exam  Constitutional:       General: Alanna is not in acute distress.     Appearance: Normal appearance.   HENT:      Head: Normocephalic.   Cardiovascular:      Rate and Rhythm: Normal rate.   Pulmonary:      Effort: Pulmonary effort is normal. No respiratory distress.   Abdominal:      General: There is no distension.      Palpations: There is no mass.      Tenderness: There is no abdominal tenderness. There is no guarding or rebound.      Hernia: No hernia is present.      Comments: 3 laparoscopic incisions well healed   Skin:     General: Skin is warm and dry.   Neurological:      Mental Status: Alanna is alert and oriented to person, place, and time.   Psychiatric:         Mood and Affect: Mood normal.         Behavior: Behavior normal.         Thought Content: Thought content normal.         Judgment: Judgment normal.             Assessment & Plan  Post-operative state  Meeting all milestones, cleared to resume all activities  Pathology reviewed - no further work up indicated  Screening mammogram previously ordered  Return to clinic for annual exam in Dec/Tae           Cam Zhu MD  , Department of Obstetrics and Gynecology  [facility]

## 2025-07-08 ENCOUNTER — APPOINTMENT (OUTPATIENT)
Dept: PHYSICAL THERAPY | Facility: CLINIC | Age: 40
End: 2025-07-08
Payer: MEDICAID

## 2025-07-08 DIAGNOSIS — R53.1 WEAKNESS: Primary | ICD-10-CM

## 2025-07-08 DIAGNOSIS — Z74.09 OTHER REDUCED MOBILITY: ICD-10-CM

## 2025-07-08 DIAGNOSIS — G90.A POSTURAL ORTHOSTATIC TACHYCARDIA SYNDROME (POTS): ICD-10-CM

## 2025-07-08 DIAGNOSIS — Z74.09 DECREASED FUNCTIONAL MOBILITY AND ENDURANCE: ICD-10-CM

## 2025-07-11 DIAGNOSIS — G90.A POTS (POSTURAL ORTHOSTATIC TACHYCARDIA SYNDROME): ICD-10-CM

## 2025-07-11 RX ORDER — PROPRANOLOL HYDROCHLORIDE 10 MG/1
20 TABLET ORAL 2 TIMES DAILY
Qty: 120 TABLET | Refills: 0 | Status: SHIPPED | OUTPATIENT
Start: 2025-07-11

## 2025-07-15 ENCOUNTER — APPOINTMENT (OUTPATIENT)
Dept: PHYSICAL THERAPY | Facility: CLINIC | Age: 40
End: 2025-07-15
Payer: MEDICAID

## 2025-07-15 DIAGNOSIS — R53.1 WEAKNESS: Primary | ICD-10-CM

## 2025-07-15 DIAGNOSIS — G90.A POSTURAL ORTHOSTATIC TACHYCARDIA SYNDROME (POTS): ICD-10-CM

## 2025-07-15 DIAGNOSIS — Z74.09 OTHER REDUCED MOBILITY: ICD-10-CM

## 2025-07-15 DIAGNOSIS — Z74.09 DECREASED FUNCTIONAL MOBILITY AND ENDURANCE: ICD-10-CM

## 2025-07-15 PROCEDURE — 97113 AQUATIC THERAPY/EXERCISES: CPT | Mod: GP

## 2025-07-15 NOTE — PROGRESS NOTES
Physical Therapy Treatment      Patient Name: Alanna Del Angel  MRN: [id number]  Today's Date: 7/15/2025  Visit #7  Time Calculation  Start Time: 0900  Stop Time: 0930  Time Calculation (min): 30 min    Insurance:  2025 [employer] AFTER 30 VS VERNELL YR     Assessment:  Pt had good tolerance to the addition of ankle weights last aquatic session, continued with 2# ankle weights this session.  Pt reported mild low back pain at the start of the session, as well as mild fatigue which did not increase throughout the session.  Brief rest breaks periodically throughout the session to help with low back decompression.  Updated HEP this session, including hip and low back stretches to help reduce tissue irritation outside of aquatic setting.   Pt would continue to benefit from aquatic therapy for POTS related symptoms, endurance deficits and generalized weakness.     Patient's response to session: No change in pain, Increased ROM/joint mobility, Increase motor control, and Increased knowledge and understanding  Aquatic PT is required due to, buoyancy of water reduced weight bearing and decreased LE and spinal loading, allowing for more freedom of movement and promotes improved ability to perform functional tasks.  The viscosity of water which is more than 700x that of air, allowed increased reaction time, in turn allowing advanced balance activities to be safely performed and allow patient to be challenged beyond limited of stability without fear of falling; provides the opportunity to work on balance in a safe environment.  The hydrostatic pressure of water facilitates the reduction of edema in the lower extremities, allowing for greater ease of movement.  Aquatics d/t cardio vascular benefits including: improved cardiovascular efficiency including increased stroke volume, decreased HR, and decreased blood pressure with increased cardiac output allowing patient to achieve cardiovascular training effect with less work  "load.   Water promotes a rapid return to movement after surgery due to a low impact environment.  Aquatics to support upright posture during postural retraining and strengthening.   Aquatics allow patient to work on gait with less assistance or without assistive device improving posture and ease of gait training/conditioning.     Plan:  Continue per POC. Focus on dynamic core stabilization, hip and quad strength, as well as inc endurance.     When pt ready, planning on hybrid 2x/wk land and aquatic   Current Problem:   1. Weakness        2. Postural orthostatic tachycardia syndrome (POTS)        3. Other reduced mobility        4. Decreased functional mobility and endurance          Subjective   Pt reports, \"I am okay today, fatigue is manageable\"      Pain: 4/10 (low back pain)       Objective       Treatments:  Therapeutic Exercise (75673):   minutes      Manual Therapy (92471):   minutes      Neuromuscular Re-education (17810):   minutes    Aquatic Therapy (24688):   30 Min 2 units  All exercises completed with 2# ankle weights  Forward walkin laps with current  Backwards walking.   Low back stretch at wall 30s 2x  Mini Squats: 2 x 10:  Lateral walking  2 x 10  Hip circles:    2 x 10 (B) (CW/CCW)  HS curls  2 x 10 (B)  Hip ABD  2 x 10 (B)  Hip FLX  2 x 10  Hip EXT   2 x 10 (B)  Standing  (B UE support)  Standing Bicycle kicks:  2 x 10 (B)  Heel Raise:  20   Horizontal shoulder ABD/ADD: 20 x Blue dumbbells  Shoulder ABD/ADD: 20 x Blue dumbbells  Shoulder FLX/EXT: 20 Blue dumbbells  Shoulder IR/ER: 20 Blue dumbbells  Floating in water post therapy session for spinal decompression.     Education and discussion on HEP and treatment regarding the benefits related to current condition, POC, pathophysiology, and precautions    Access Code: [email or web address]  URL: [email or web address]  Date: 2025  Prepared by: Kin Suazo    Exercises  - Seated Flexion Stretch with Swiss Ball  - 1 x daily " - 7 x weekly - 1 sets - 10 reps  - Child's Pose Stretch  - 1 x daily - 7 x weekly - 1 sets - 10 reps - 5s hold  - Seated Piriformis Stretch with Trunk Bend  - 1 x daily - 7 x weekly - 1 sets - 10 reps - 5s hold  - Seated Hamstring Stretch  - 1 x daily - 7 x weekly - 3 sets - 10 reps - 5s hold

## 2025-07-18 ENCOUNTER — HOSPITAL ENCOUNTER (OUTPATIENT)
Dept: RADIOLOGY | Facility: CLINIC | Age: 40
Discharge: HOME | End: 2025-07-18
Payer: MEDICAID

## 2025-07-18 DIAGNOSIS — Z12.31 ENCOUNTER FOR SCREENING MAMMOGRAM FOR MALIGNANT NEOPLASM OF BREAST: ICD-10-CM

## 2025-07-18 PROCEDURE — 77063 BREAST TOMOSYNTHESIS BI: CPT

## 2025-07-22 ENCOUNTER — APPOINTMENT (OUTPATIENT)
Dept: PHYSICAL THERAPY | Facility: CLINIC | Age: 40
End: 2025-07-22
Payer: MEDICAID

## 2025-07-22 DIAGNOSIS — G90.A POSTURAL ORTHOSTATIC TACHYCARDIA SYNDROME (POTS): ICD-10-CM

## 2025-07-22 DIAGNOSIS — R53.1 WEAKNESS: Primary | ICD-10-CM

## 2025-07-22 DIAGNOSIS — Z74.09 DECREASED FUNCTIONAL MOBILITY AND ENDURANCE: ICD-10-CM

## 2025-07-22 DIAGNOSIS — Z74.09 OTHER REDUCED MOBILITY: ICD-10-CM

## 2025-07-22 PROCEDURE — 97113 AQUATIC THERAPY/EXERCISES: CPT | Mod: GP

## 2025-07-22 NOTE — PROGRESS NOTES
Physical Therapy Treatment      Patient Name: Alanna Del Angel  MRN: [id number]  Today's Date: 7/22/2025  Visit #8  Time Calculation  Start Time: 0902  Stop Time: 0931  Time Calculation (min): 29 min    Insurance:  2025 [employer] AUTH AFTER 30 VS VERNELL YR     Assessment:  Pt had good tolerance to the addition of ankle weights last aquatic session, continued with 2# ankle weights this session due to good recovery after last session.  Pt had occasional standing rest breaks throughout the session, completing spinal extensor stretch.  Maintained good form throughout the session, with no cueing required.  Re introduced walking against current in which the pt was able to complete with good tolerance and without complications.   Pt would continue to benefit from aquatic therapy for POTS related symptoms, endurance deficits and generalized weakness.     Patient's response to session: No change in pain, Increased ROM/joint mobility, Increase motor control, and Increased knowledge and understanding  Aquatic PT is required due to, buoyancy of water reduced weight bearing and decreased LE and spinal loading, allowing for more freedom of movement and promotes improved ability to perform functional tasks.  The viscosity of water which is more than 700x that of air, allowed increased reaction time, in turn allowing advanced balance activities to be safely performed and allow patient to be challenged beyond limited of stability without fear of falling; provides the opportunity to work on balance in a safe environment.  The hydrostatic pressure of water facilitates the reduction of edema in the lower extremities, allowing for greater ease of movement.  Aquatics d/t cardio vascular benefits including: improved cardiovascular efficiency including increased stroke volume, decreased HR, and decreased blood pressure with increased cardiac output allowing patient to achieve cardiovascular training effect with less work load.   Water  "promotes a rapid return to movement after surgery due to a low impact environment.  Aquatics to support upright posture during postural retraining and strengthening.   Aquatics allow patient to work on gait with less assistance or without assistive device improving posture and ease of gait training/conditioning.     Plan:  Continue per POC. Focus on dynamic core stabilization, hip and quad strength, as well as inc endurance.     When pt ready, planning on hybrid 2x/wk land and aquatic   Current Problem:   1. Weakness        2. Postural orthostatic tachycardia syndrome (POTS)        3. Other reduced mobility        4. Decreased functional mobility and endurance          Subjective   Pt reports, \"No major fatigue after last session, about a day of recovery\"   Pain: 4/10 (low back pain)       Objective       Treatments:  Therapeutic Exercise (09149):   minutes      Manual Therapy (03563):   minutes      Neuromuscular Re-education (43893):   minutes    Aquatic Therapy (28438):   29 Min 2 units  Water walking against current:   All exercises completed with 2# ankle weights  Forward walkin laps with current  Backwards walking.   Low back stretch at wall 30s 2x  Mini Squats: 2 x 10:  Lateral walking  2 x 10  Hip circles:    2 x 10 (B) (CW/CCW)  HS curls  2 x 10 (B)  Hip ABD  2 x 10 (B)  Hip FLX  2 x 10  Hip EXT   2 x 10 (B)  Standing  (B UE support)  Standing Bicycle kicks:  2 x 10 (B)  Heel Raise:  20   Horizontal shoulder ABD/ADD: 20 x Blue dumbbells  Shoulder ABD/ADD: 20 x Blue dumbbells  Shoulder FLX/EXT: 20 Blue dumbbells  Shoulder IR/ER: 20 Blue dumbbells  Floating in water post therapy session for spinal decompression.     Education and discussion on HEP and treatment regarding the benefits related to current condition, POC, pathophysiology, and precautions    Access Code: [email or web address]  URL: [email or web address]  Date: 2025  Prepared by: Kin Woodward  - Seated Flexion " Stretch with Swiss Ball  - 1 x daily - 7 x weekly - 1 sets - 10 reps  - Child's Pose Stretch  - 1 x daily - 7 x weekly - 1 sets - 10 reps - 5s hold  - Seated Piriformis Stretch with Trunk Bend  - 1 x daily - 7 x weekly - 1 sets - 10 reps - 5s hold  - Seated Hamstring Stretch  - 1 x daily - 7 x weekly - 3 sets - 10 reps - 5s hold

## 2025-07-25 ENCOUNTER — OFFICE VISIT (OUTPATIENT)
Dept: OPHTHALMOLOGY | Facility: CLINIC | Age: 40
End: 2025-07-25
Payer: COMMERCIAL

## 2025-07-25 DIAGNOSIS — H16.223 KERATOCONJUNCTIVITIS SICCA OF BOTH EYES NOT SPECIFIED AS SJOGREN'S: ICD-10-CM

## 2025-07-25 DIAGNOSIS — H52.13 MYOPIA OF BOTH EYES: Primary | ICD-10-CM

## 2025-07-25 DIAGNOSIS — H52.223 REGULAR ASTIGMATISM OF BOTH EYES: ICD-10-CM

## 2025-07-25 RX ORDER — POLYETHYLENE GLYCOL 400 AND PROPYLENE GLYCOL 4; 3 MG/ML; MG/ML
1 SOLUTION/ DROPS OPHTHALMIC 3 TIMES DAILY
Qty: 15 ML | Refills: 3 | Status: SHIPPED | OUTPATIENT
Start: 2025-07-25 | End: 2026-01-21

## 2025-07-25 ASSESSMENT — REFRACTION_MANIFEST
OS_CYLINDER: -0.75
OS_AXIS: 135
OD_SPHERE: -1.00
OS_SPHERE: -1.50
OD_AXIS: 167
OD_CYLINDER: -1.25

## 2025-07-25 ASSESSMENT — ENCOUNTER SYMPTOMS
GASTROINTESTINAL NEGATIVE: 0
MUSCULOSKELETAL NEGATIVE: 0
ALLERGIC/IMMUNOLOGIC NEGATIVE: 0
EYES NEGATIVE: 0
PSYCHIATRIC NEGATIVE: 0
NEUROLOGICAL NEGATIVE: 0
ENDOCRINE NEGATIVE: 0
CARDIOVASCULAR NEGATIVE: 0
RESPIRATORY NEGATIVE: 0
CONSTITUTIONAL NEGATIVE: 0
HEMATOLOGIC/LYMPHATIC NEGATIVE: 0

## 2025-07-25 ASSESSMENT — REFRACTION_WEARINGRX
OD_SPHERE: -1.00
OS_AXIS: 135
OS_SPHERE: -1.50
OD_AXIS: 167
OS_CYLINDER: -0.75
OD_CYLINDER: -1.25

## 2025-07-25 ASSESSMENT — EXTERNAL EXAM - RIGHT EYE: OD_EXAM: NORMAL

## 2025-07-25 ASSESSMENT — CONF VISUAL FIELD
OD_NORMAL: 1
OD_SUPERIOR_TEMPORAL_RESTRICTION: 0
OS_INFERIOR_NASAL_RESTRICTION: 0
OS_NORMAL: 1
OD_INFERIOR_TEMPORAL_RESTRICTION: 0
OS_INFERIOR_TEMPORAL_RESTRICTION: 0
METHOD: COUNTING FINGERS
OS_SUPERIOR_TEMPORAL_RESTRICTION: 0
OD_SUPERIOR_NASAL_RESTRICTION: 0
OD_INFERIOR_NASAL_RESTRICTION: 0
OS_SUPERIOR_NASAL_RESTRICTION: 0

## 2025-07-25 ASSESSMENT — TEAR BREAK UP TIME (TBUT)
OS_TBUT: 8
OD_TBUT: 8

## 2025-07-25 ASSESSMENT — CUP TO DISC RATIO
OD_RATIO: 0.3
OS_RATIO: 0.4

## 2025-07-25 ASSESSMENT — SCHIRMERS TEST
OD_SCHIRMERS: 30
OS_SCHIRMERS: 30

## 2025-07-25 ASSESSMENT — VISUAL ACUITY
METHOD: SNELLEN - LINEAR
OD_CC: 20/20
OS_CC: 20/20

## 2025-07-25 ASSESSMENT — SLIT LAMP EXAM - LIDS
COMMENTS: NORMAL, 1+ MGD
COMMENTS: NORMAL, 1+ MGD

## 2025-07-25 ASSESSMENT — EXTERNAL EXAM - LEFT EYE: OS_EXAM: NORMAL

## 2025-07-25 NOTE — PROGRESS NOTES
"Assessment/Plan   Diagnoses and all orders for this visit:  Myopia of both eyes  Regular astigmatism of both eyes  New spec rx released today per patient request. Ocular health wnl for age OU. Monitor 1 year or sooner prn. Refraction billed today. Pt consents to receiving glasses Rx today. Patient's/guardian's signature obtained to acknowledge and confirm that a paper copy of glasses Rx was given to patient in compliance with [facility] Eyeglass Rule. Electronic copy of Rx will also be available via [employer].     Keratoconjunctivitis sicca of both eyes not specified as Sjogren's  -     peg 400-propylene glycol (Systane Ultra) 0.4-0.3 % drops ophthalmic drops; Administer 1 drop into both eyes 3 times a day.  Recommend use of artificial tears bid-qid OU. Discussed ATs work best when used consistently multiple times a day. Discussed brand options and preserved vs. PF. Avoid visine/cleareyes/\"get the red out\" drops.   Recommend WC qday x 10 min.     "

## 2025-07-29 ENCOUNTER — APPOINTMENT (OUTPATIENT)
Dept: PHYSICAL THERAPY | Facility: CLINIC | Age: 40
End: 2025-07-29
Payer: MEDICAID

## 2025-07-29 DIAGNOSIS — Z74.09 OTHER REDUCED MOBILITY: ICD-10-CM

## 2025-07-29 DIAGNOSIS — G90.A POSTURAL ORTHOSTATIC TACHYCARDIA SYNDROME (POTS): ICD-10-CM

## 2025-07-29 DIAGNOSIS — R53.1 WEAKNESS: Primary | ICD-10-CM

## 2025-07-29 DIAGNOSIS — Z74.09 DECREASED FUNCTIONAL MOBILITY AND ENDURANCE: ICD-10-CM

## 2025-07-29 PROCEDURE — 97113 AQUATIC THERAPY/EXERCISES: CPT | Mod: GP

## 2025-07-29 NOTE — PROGRESS NOTES
Physical Therapy Treatment  Patient Name: Alanna Del Angel  MRN: [id number]  Today's Date: 7/29/2025  Visit #9  Time Calculation  Start Time: 0905  Stop Time: 0928  Time Calculation (min): 23 min    Insurance:  2025 [employer] AUTH AFTER 30 VS VERNELL YR     Assessment:  Pt had good tolerance to the addition of ankle weights last aquatic session, continued with 2# ankle weights this session due to good recovery after last session.  Pt had occasional standing rest breaks throughout the session, completing spinal extensor stretch.  Maintained good form throughout the session, with no cueing required.  Pt had no increase in pain post therapy session today. Pt would continue to benefit from aquatic therapy for POTS related symptoms, endurance deficits and generalized weakness.     Patient's response to session: No change in pain, Increased ROM/joint mobility, Increase motor control, and Increased knowledge and understanding  Aquatic PT is required due to, buoyancy of water reduced weight bearing and decreased LE and spinal loading, allowing for more freedom of movement and promotes improved ability to perform functional tasks.  The viscosity of water which is more than 700x that of air, allowed increased reaction time, in turn allowing advanced balance activities to be safely performed and allow patient to be challenged beyond limited of stability without fear of falling; provides the opportunity to work on balance in a safe environment.  The hydrostatic pressure of water facilitates the reduction of edema in the lower extremities, allowing for greater ease of movement.  Aquatics d/t cardio vascular benefits including: improved cardiovascular efficiency including increased stroke volume, decreased HR, and decreased blood pressure with increased cardiac output allowing patient to achieve cardiovascular training effect with less work load.   Water promotes a rapid return to movement after surgery due to a low impact  "environment.  Aquatics to support upright posture during postural retraining and strengthening.   Aquatics allow patient to work on gait with less assistance or without assistive device improving posture and ease of gait training/conditioning.     Plan:  Continue per POC. Focus on dynamic core stabilization, hip and quad strength, as well as inc endurance.     When pt ready, planning on hybrid 2x/wk land and aquatic   Current Problem:   1. Weakness        2. Postural orthostatic tachycardia syndrome (POTS)        3. Other reduced mobility        4. Decreased functional mobility and endurance          Subjective   Pt reports, \"I feel okay today, I slept terrible so I am a little tired, but I felt okay after last session, nothing abnormal, about a day of fatigue.\"   Pain: 4/10 (low back pain)       Objective       Treatments:  Therapeutic Exercise (31734):   minutes      Manual Therapy (31256):   minutes      Neuromuscular Re-education (95659):   minutes    Aquatic Therapy (54269):   29 Min 2 units  Water walking against current:   All exercises completed with 2# ankle weights  Forward walkin laps with current  Backwards walking.   Low back stretch at wall 30s 2x  Mini Squats: 2 x 10:  Lateral walking  2 x 10  Hip circles:    2 x 10 (B) (CW/CCW)  HS curls  2 x 10 (B)  Hip ABD  2 x 10 (B)  Hip FLX  2 x 10  Hip EXT   2 x 10 (B)  Standing  (B UE support)  Standing Bicycle kicks:  2 x 10 (B)  Heel Raise:  20   Horizontal shoulder ABD/ADD: 20 x Blue dumbbells  Shoulder ABD/ADD: 20 x Blue dumbbells  Shoulder FLX/EXT: 20 Blue dumbbells  Shoulder IR/ER: 20 Blue dumbbells  Floating in water post therapy session for spinal decompression.     Education and discussion on HEP and treatment regarding the benefits related to current condition, POC, pathophysiology, and precautions    Access Code: [email or web address]  URL: [email or web address]  Date: 2025  Prepared by: Kin Woodward  - Seated " Flexion Stretch with Swiss Ball  - 1 x daily - 7 x weekly - 1 sets - 10 reps  - Child's Pose Stretch  - 1 x daily - 7 x weekly - 1 sets - 10 reps - 5s hold  - Seated Piriformis Stretch with Trunk Bend  - 1 x daily - 7 x weekly - 1 sets - 10 reps - 5s hold  - Seated Hamstring Stretch  - 1 x daily - 7 x weekly - 3 sets - 10 reps - 5s hold

## 2025-08-05 ENCOUNTER — APPOINTMENT (OUTPATIENT)
Dept: PHYSICAL THERAPY | Facility: CLINIC | Age: 40
End: 2025-08-05
Payer: MEDICAID

## 2025-08-11 ENCOUNTER — TELEMEDICINE (OUTPATIENT)
Dept: OTHER | Facility: CLINIC | Age: 40
End: 2025-08-11
Payer: MEDICAID

## 2025-08-11 DIAGNOSIS — U09.9 LONG COVID: Primary | ICD-10-CM

## 2025-08-11 PROCEDURE — 99213 OFFICE O/P EST LOW 20 MIN: CPT | Performed by: NURSE PRACTITIONER

## 2025-08-12 ENCOUNTER — APPOINTMENT (OUTPATIENT)
Dept: PHYSICAL THERAPY | Facility: CLINIC | Age: 40
End: 2025-08-12
Payer: MEDICAID

## 2025-08-12 DIAGNOSIS — Z74.09 DECREASED FUNCTIONAL MOBILITY AND ENDURANCE: ICD-10-CM

## 2025-08-12 DIAGNOSIS — G90.A POSTURAL ORTHOSTATIC TACHYCARDIA SYNDROME (POTS): ICD-10-CM

## 2025-08-12 DIAGNOSIS — Z74.09 OTHER REDUCED MOBILITY: ICD-10-CM

## 2025-08-12 DIAGNOSIS — R53.1 WEAKNESS: Primary | ICD-10-CM

## 2025-08-12 PROCEDURE — 97113 AQUATIC THERAPY/EXERCISES: CPT | Mod: GP

## 2025-08-13 DIAGNOSIS — R10.84 GENERALIZED ABDOMINAL PAIN: ICD-10-CM

## 2025-08-13 DIAGNOSIS — U09.9 POST-ACUTE SEQUELAE OF COVID-19 (PASC): ICD-10-CM

## 2025-08-13 DIAGNOSIS — G90.A POTS (POSTURAL ORTHOSTATIC TACHYCARDIA SYNDROME): ICD-10-CM

## 2025-08-14 RX ORDER — PROPRANOLOL HYDROCHLORIDE 10 MG/1
20 TABLET ORAL 2 TIMES DAILY
Qty: 120 TABLET | Refills: 0 | Status: SHIPPED | OUTPATIENT
Start: 2025-08-14

## 2025-08-14 RX ORDER — FAMOTIDINE 20 MG/1
20 TABLET, FILM COATED ORAL 2 TIMES DAILY
Qty: 180 TABLET | Refills: 0 | Status: SHIPPED | OUTPATIENT
Start: 2025-08-14

## 2025-08-19 ENCOUNTER — APPOINTMENT (OUTPATIENT)
Dept: PHYSICAL THERAPY | Facility: CLINIC | Age: 40
End: 2025-08-19
Payer: MEDICAID

## 2025-08-26 ENCOUNTER — TELEPHONE (OUTPATIENT)
Dept: OTHER | Facility: CLINIC | Age: 40
End: 2025-08-26

## 2025-08-26 ENCOUNTER — APPOINTMENT (OUTPATIENT)
Dept: PHYSICAL THERAPY | Facility: CLINIC | Age: 40
End: 2025-08-26
Payer: MEDICAID

## 2025-08-26 DIAGNOSIS — G90.A POSTURAL ORTHOSTATIC TACHYCARDIA SYNDROME (POTS): ICD-10-CM

## 2025-08-26 DIAGNOSIS — Z74.09 OTHER REDUCED MOBILITY: ICD-10-CM

## 2025-08-26 DIAGNOSIS — R53.1 WEAKNESS: Primary | ICD-10-CM

## 2025-08-26 DIAGNOSIS — Z74.09 DECREASED FUNCTIONAL MOBILITY AND ENDURANCE: ICD-10-CM

## 2025-08-26 PROCEDURE — 97113 AQUATIC THERAPY/EXERCISES: CPT | Mod: GP

## 2025-09-02 ENCOUNTER — APPOINTMENT (OUTPATIENT)
Dept: PHYSICAL THERAPY | Facility: CLINIC | Age: 40
End: 2025-09-02
Payer: MEDICAID

## 2025-09-09 ENCOUNTER — APPOINTMENT (OUTPATIENT)
Dept: PHYSICAL THERAPY | Facility: CLINIC | Age: 40
End: 2025-09-09
Payer: MEDICAID

## 2025-09-09 DIAGNOSIS — Z74.09 OTHER REDUCED MOBILITY: ICD-10-CM

## 2025-09-09 DIAGNOSIS — Z74.09 DECREASED FUNCTIONAL MOBILITY AND ENDURANCE: ICD-10-CM

## 2025-09-09 DIAGNOSIS — G90.A POSTURAL ORTHOSTATIC TACHYCARDIA SYNDROME (POTS): ICD-10-CM

## 2025-09-09 DIAGNOSIS — R53.1 WEAKNESS: Primary | ICD-10-CM

## 2025-09-16 ENCOUNTER — APPOINTMENT (OUTPATIENT)
Dept: PHYSICAL THERAPY | Facility: CLINIC | Age: 40
End: 2025-09-16
Payer: MEDICAID

## 2025-09-16 DIAGNOSIS — Z74.09 DECREASED FUNCTIONAL MOBILITY AND ENDURANCE: ICD-10-CM

## 2025-09-16 DIAGNOSIS — R53.1 WEAKNESS: Primary | ICD-10-CM

## 2025-09-16 DIAGNOSIS — Z74.09 OTHER REDUCED MOBILITY: ICD-10-CM

## 2025-09-16 DIAGNOSIS — G90.A POSTURAL ORTHOSTATIC TACHYCARDIA SYNDROME (POTS): ICD-10-CM

## 2025-09-18 ENCOUNTER — APPOINTMENT (OUTPATIENT)
Dept: BEHAVIORAL HEALTH | Facility: CLINIC | Age: 40
End: 2025-09-18
Payer: MEDICAID

## 2025-09-23 ENCOUNTER — APPOINTMENT (OUTPATIENT)
Dept: PHYSICAL THERAPY | Facility: CLINIC | Age: 40
End: 2025-09-23
Payer: MEDICAID

## 2025-09-23 DIAGNOSIS — R53.1 WEAKNESS: Primary | ICD-10-CM

## 2025-09-23 DIAGNOSIS — G90.A POSTURAL ORTHOSTATIC TACHYCARDIA SYNDROME (POTS): ICD-10-CM

## 2025-09-23 DIAGNOSIS — Z74.09 OTHER REDUCED MOBILITY: ICD-10-CM

## 2025-09-23 DIAGNOSIS — Z74.09 DECREASED FUNCTIONAL MOBILITY AND ENDURANCE: ICD-10-CM

## 2025-09-30 ENCOUNTER — APPOINTMENT (OUTPATIENT)
Dept: PHYSICAL THERAPY | Facility: CLINIC | Age: 40
End: 2025-09-30
Payer: MEDICAID

## 2025-09-30 DIAGNOSIS — G90.A POSTURAL ORTHOSTATIC TACHYCARDIA SYNDROME (POTS): ICD-10-CM

## 2025-09-30 DIAGNOSIS — Z74.09 DECREASED FUNCTIONAL MOBILITY AND ENDURANCE: ICD-10-CM

## 2025-09-30 DIAGNOSIS — R53.1 WEAKNESS: Primary | ICD-10-CM

## 2025-09-30 DIAGNOSIS — Z74.09 OTHER REDUCED MOBILITY: ICD-10-CM

## 2025-10-16 ENCOUNTER — APPOINTMENT (OUTPATIENT)
Dept: OPHTHALMOLOGY | Facility: CLINIC | Age: 40
End: 2025-10-16
Payer: MEDICAID

## 2025-11-13 ENCOUNTER — APPOINTMENT (OUTPATIENT)
Dept: OTHER | Facility: CLINIC | Age: 40
End: 2025-11-13
Payer: MEDICAID

## 2025-12-01 ENCOUNTER — APPOINTMENT (OUTPATIENT)
Dept: RHEUMATOLOGY | Facility: CLINIC | Age: 40
End: 2025-12-01
Payer: MEDICAID

## (undated) DEVICE — ADHESIVE, SKIN, LIQUIBAND EXCEED

## (undated) DEVICE — DRESSING, NON-ADHERENT, TELFA, OUCHLESS, 3 X 4 IN, STERILE

## (undated) DEVICE — Device

## (undated) DEVICE — APPLICATOR, CHLORAPREP, W/ORANGE TINT, 26ML

## (undated) DEVICE — SUTURE, VICRYL, 4-0, 18 IN, UNDYED BR PS-2

## (undated) DEVICE — LIGASURE, V SEALER/DIVIDER  5MM BLUNT TIP

## (undated) DEVICE — TROCAR, KII OPTICAL BLADELESS 5MM Z THREAD 100MM LNGTH

## (undated) DEVICE — PREP TRAY, GYNECOLOGY